# Patient Record
Sex: FEMALE | Race: BLACK OR AFRICAN AMERICAN | NOT HISPANIC OR LATINO | URBAN - METROPOLITAN AREA
[De-identification: names, ages, dates, MRNs, and addresses within clinical notes are randomized per-mention and may not be internally consistent; named-entity substitution may affect disease eponyms.]

---

## 2017-02-08 ENCOUNTER — INPATIENT (INPATIENT)
Facility: HOSPITAL | Age: 82
LOS: 4 days | Discharge: HOME HEALTH | End: 2017-02-13
Attending: INTERNAL MEDICINE | Admitting: INTERNAL MEDICINE
Payer: MEDICARE

## 2017-02-08 VITALS
RESPIRATION RATE: 20 BRPM | OXYGEN SATURATION: 99 % | SYSTOLIC BLOOD PRESSURE: 154 MMHG | HEART RATE: 56 BPM | TEMPERATURE: 98 F | DIASTOLIC BLOOD PRESSURE: 74 MMHG

## 2017-02-08 DIAGNOSIS — J18.9 PNEUMONIA, UNSPECIFIED ORGANISM: ICD-10-CM

## 2017-02-08 DIAGNOSIS — Z29.9 ENCOUNTER FOR PROPHYLACTIC MEASURES, UNSPECIFIED: ICD-10-CM

## 2017-02-08 DIAGNOSIS — K85.90 ACUTE PANCREATITIS WITHOUT NECROSIS OR INFECTION, UNSPECIFIED: ICD-10-CM

## 2017-02-08 DIAGNOSIS — R74.8 ABNORMAL LEVELS OF OTHER SERUM ENZYMES: ICD-10-CM

## 2017-02-08 DIAGNOSIS — Z86.73 PERSONAL HISTORY OF TRANSIENT ISCHEMIC ATTACK (TIA), AND CEREBRAL INFARCTION WITHOUT RESIDUAL DEFICITS: ICD-10-CM

## 2017-02-08 DIAGNOSIS — R94.31 ABNORMAL ELECTROCARDIOGRAM [ECG] [EKG]: ICD-10-CM

## 2017-02-08 DIAGNOSIS — R63.8 OTHER SYMPTOMS AND SIGNS CONCERNING FOOD AND FLUID INTAKE: ICD-10-CM

## 2017-02-08 DIAGNOSIS — Z93.1 GASTROSTOMY STATUS: Chronic | ICD-10-CM

## 2017-02-08 DIAGNOSIS — Z95.828 PRESENCE OF OTHER VASCULAR IMPLANTS AND GRAFTS: Chronic | ICD-10-CM

## 2017-02-08 DIAGNOSIS — N18.6 END STAGE RENAL DISEASE: ICD-10-CM

## 2017-02-08 DIAGNOSIS — Z98.49 CATARACT EXTRACTION STATUS, UNSPECIFIED EYE: Chronic | ICD-10-CM

## 2017-02-08 LAB
ALBUMIN SERPL ELPH-MCNC: 4.2 G/DL — SIGNIFICANT CHANGE UP (ref 3.3–5)
ALP SERPL-CCNC: 157 U/L — HIGH (ref 40–120)
ALT FLD-CCNC: 21 U/L — SIGNIFICANT CHANGE UP (ref 4–33)
AST SERPL-CCNC: 25 U/L — SIGNIFICANT CHANGE UP (ref 4–32)
BASE EXCESS BLDV CALC-SCNC: -7.2 MMOL/L — SIGNIFICANT CHANGE UP
BASOPHILS # BLD AUTO: 0.03 K/UL — SIGNIFICANT CHANGE UP (ref 0–0.2)
BASOPHILS NFR BLD AUTO: 0.3 % — SIGNIFICANT CHANGE UP (ref 0–2)
BASOPHILS NFR SPEC: 0 % — SIGNIFICANT CHANGE UP (ref 0–2)
BILIRUB SERPL-MCNC: 0.4 MG/DL — SIGNIFICANT CHANGE UP (ref 0.2–1.2)
BLOOD GAS VENOUS - CREATININE: 13.6 MG/DL — HIGH (ref 0.5–1.3)
BUN SERPL-MCNC: 205 MG/DL — HIGH (ref 7–23)
CALCIUM SERPL-MCNC: 10.5 MG/DL — SIGNIFICANT CHANGE UP (ref 8.4–10.5)
CHLORIDE BLDV-SCNC: 100 MMOL/L — SIGNIFICANT CHANGE UP (ref 96–108)
CHLORIDE SERPL-SCNC: 92 MMOL/L — LOW (ref 98–107)
CK MB BLD-MCNC: 16.69 NG/ML — HIGH (ref 1–4.7)
CK MB BLD-MCNC: SIGNIFICANT CHANGE UP (ref 0–2.5)
CK SERPL-CCNC: 100 U/L — SIGNIFICANT CHANGE UP (ref 25–170)
CO2 SERPL-SCNC: 13 MMOL/L — LOW (ref 22–31)
CREAT SERPL-MCNC: 12.45 MG/DL — HIGH (ref 0.5–1.3)
EOSINOPHIL # BLD AUTO: 0.2 K/UL — SIGNIFICANT CHANGE UP (ref 0–0.5)
EOSINOPHIL NFR BLD AUTO: 1.7 % — SIGNIFICANT CHANGE UP (ref 0–6)
EOSINOPHIL NFR FLD: 1 % — SIGNIFICANT CHANGE UP (ref 0–6)
GAS PNL BLDV: 134 MMOL/L — LOW (ref 136–146)
GLUCOSE BLDV-MCNC: 99 — SIGNIFICANT CHANGE UP (ref 70–99)
GLUCOSE SERPL-MCNC: 102 MG/DL — HIGH (ref 70–99)
HCO3 BLDV-SCNC: 17 MMOL/L — LOW (ref 20–27)
HCT VFR BLD CALC: 33.2 % — LOW (ref 34.5–45)
HCT VFR BLDV CALC: 33.7 % — LOW (ref 34.5–45)
HGB BLD-MCNC: 10.8 G/DL — LOW (ref 11.5–15.5)
HGB BLDV-MCNC: 10.9 G/DL — LOW (ref 11.5–15.5)
IMM GRANULOCYTES NFR BLD AUTO: 0.2 % — SIGNIFICANT CHANGE UP (ref 0–1.5)
LACTATE BLDV-MCNC: 0.8 MMOL/L — SIGNIFICANT CHANGE UP (ref 0.5–2)
LIDOCAIN IGE QN: > 600 U/L — HIGH (ref 7–60)
LYMPHOCYTES # BLD AUTO: 1.49 K/UL — SIGNIFICANT CHANGE UP (ref 1–3.3)
LYMPHOCYTES # BLD AUTO: 12.9 % — LOW (ref 13–44)
LYMPHOCYTES NFR SPEC AUTO: 16 % — SIGNIFICANT CHANGE UP (ref 13–44)
MANUAL SMEAR VERIFICATION: SIGNIFICANT CHANGE UP
MCHC RBC-ENTMCNC: 30.5 PG — SIGNIFICANT CHANGE UP (ref 27–34)
MCHC RBC-ENTMCNC: 32.5 % — SIGNIFICANT CHANGE UP (ref 32–36)
MCV RBC AUTO: 93.8 FL — SIGNIFICANT CHANGE UP (ref 80–100)
MONOCYTES # BLD AUTO: 1.1 K/UL — HIGH (ref 0–0.9)
MONOCYTES NFR BLD AUTO: 9.5 % — SIGNIFICANT CHANGE UP (ref 2–14)
MONOCYTES NFR BLD: 10 % — HIGH (ref 2–9)
NEUTROPHIL AB SER-ACNC: 73 % — SIGNIFICANT CHANGE UP (ref 43–77)
NEUTROPHILS # BLD AUTO: 8.75 K/UL — HIGH (ref 1.8–7.4)
NEUTROPHILS NFR BLD AUTO: 75.4 % — SIGNIFICANT CHANGE UP (ref 43–77)
PCO2 BLDV: 50 MMHG — SIGNIFICANT CHANGE UP (ref 41–51)
PH BLDV: 7.21 PH — LOW (ref 7.32–7.43)
PLATELET # BLD AUTO: 192 K/UL — SIGNIFICANT CHANGE UP (ref 150–400)
PMV BLD: 12.7 FL — SIGNIFICANT CHANGE UP (ref 7–13)
PO2 BLDV: 31 MMHG — LOW (ref 35–40)
POTASSIUM BLDV-SCNC: 3.9 MMOL/L — SIGNIFICANT CHANGE UP (ref 3.4–4.5)
POTASSIUM SERPL-MCNC: 4.5 MMOL/L — SIGNIFICANT CHANGE UP (ref 3.5–5.3)
POTASSIUM SERPL-SCNC: 4.5 MMOL/L — SIGNIFICANT CHANGE UP (ref 3.5–5.3)
PROT SERPL-MCNC: 7.6 G/DL — SIGNIFICANT CHANGE UP (ref 6–8.3)
RBC # BLD: 3.54 M/UL — LOW (ref 3.8–5.2)
RBC # FLD: 16 % — HIGH (ref 10.3–14.5)
SAO2 % BLDV: 43.2 % — LOW (ref 60–85)
SODIUM SERPL-SCNC: 142 MMOL/L — SIGNIFICANT CHANGE UP (ref 135–145)
TROPONIN T SERPL-MCNC: 0.08 NG/ML — HIGH (ref 0–0.06)
WBC # BLD: 11.59 K/UL — HIGH (ref 3.8–10.5)
WBC # FLD AUTO: 11.59 K/UL — HIGH (ref 3.8–10.5)

## 2017-02-08 PROCEDURE — 71010: CPT | Mod: 26

## 2017-02-08 RX ORDER — CHOLECALCIFEROL (VITAMIN D3) 125 MCG
1000 CAPSULE ORAL DAILY
Qty: 0 | Refills: 0 | Status: DISCONTINUED | OUTPATIENT
Start: 2017-02-08 | End: 2017-02-08

## 2017-02-08 RX ORDER — ERYTHROPOIETIN 10000 [IU]/ML
6000 INJECTION, SOLUTION INTRAVENOUS; SUBCUTANEOUS
Qty: 0 | Refills: 0 | Status: DISCONTINUED | OUTPATIENT
Start: 2017-02-08 | End: 2017-02-10

## 2017-02-08 RX ORDER — MIDODRINE HYDROCHLORIDE 2.5 MG/1
10 TABLET ORAL
Qty: 0 | Refills: 0 | Status: DISCONTINUED | OUTPATIENT
Start: 2017-02-08 | End: 2017-02-13

## 2017-02-08 RX ORDER — VANCOMYCIN HCL 1 G
1000 VIAL (EA) INTRAVENOUS ONCE
Qty: 0 | Refills: 0 | Status: COMPLETED | OUTPATIENT
Start: 2017-02-08 | End: 2017-02-08

## 2017-02-08 RX ORDER — ONDANSETRON 8 MG/1
4 TABLET, FILM COATED ORAL ONCE
Qty: 0 | Refills: 0 | Status: COMPLETED | OUTPATIENT
Start: 2017-02-08 | End: 2017-02-08

## 2017-02-08 RX ORDER — MORPHINE SULFATE 50 MG/1
2 CAPSULE, EXTENDED RELEASE ORAL EVERY 6 HOURS
Qty: 0 | Refills: 0 | Status: DISCONTINUED | OUTPATIENT
Start: 2017-02-08 | End: 2017-02-13

## 2017-02-08 RX ORDER — FAMOTIDINE 10 MG/ML
20 INJECTION INTRAVENOUS ONCE
Qty: 0 | Refills: 0 | Status: COMPLETED | OUTPATIENT
Start: 2017-02-08 | End: 2017-02-08

## 2017-02-08 RX ORDER — SIMVASTATIN 20 MG/1
40 TABLET, FILM COATED ORAL AT BEDTIME
Qty: 0 | Refills: 0 | Status: DISCONTINUED | OUTPATIENT
Start: 2017-02-08 | End: 2017-02-13

## 2017-02-08 RX ORDER — SODIUM CHLORIDE 9 MG/ML
1000 INJECTION, SOLUTION INTRAVENOUS
Qty: 0 | Refills: 0 | Status: DISCONTINUED | OUTPATIENT
Start: 2017-02-08 | End: 2017-02-08

## 2017-02-08 RX ORDER — HEPARIN SODIUM 5000 [USP'U]/ML
5000 INJECTION INTRAVENOUS; SUBCUTANEOUS EVERY 8 HOURS
Qty: 0 | Refills: 0 | Status: DISCONTINUED | OUTPATIENT
Start: 2017-02-08 | End: 2017-02-13

## 2017-02-08 RX ORDER — PANTOPRAZOLE SODIUM 20 MG/1
40 TABLET, DELAYED RELEASE ORAL
Qty: 0 | Refills: 0 | Status: DISCONTINUED | OUTPATIENT
Start: 2017-02-08 | End: 2017-02-09

## 2017-02-08 RX ORDER — SODIUM CHLORIDE 9 MG/ML
1000 INJECTION, SOLUTION INTRAVENOUS
Qty: 0 | Refills: 0 | Status: DISCONTINUED | OUTPATIENT
Start: 2017-02-08 | End: 2017-02-09

## 2017-02-08 RX ORDER — SODIUM CHLORIDE 9 MG/ML
250 INJECTION INTRAMUSCULAR; INTRAVENOUS; SUBCUTANEOUS ONCE
Qty: 0 | Refills: 0 | Status: COMPLETED | OUTPATIENT
Start: 2017-02-08 | End: 2017-02-08

## 2017-02-08 RX ORDER — AZTREONAM 2 G
1000 VIAL (EA) INJECTION ONCE
Qty: 0 | Refills: 0 | Status: COMPLETED | OUTPATIENT
Start: 2017-02-08 | End: 2017-02-08

## 2017-02-08 RX ADMIN — Medication 250 MILLIGRAM(S): at 19:28

## 2017-02-08 RX ADMIN — Medication 30 MILLILITER(S): at 17:58

## 2017-02-08 RX ADMIN — ONDANSETRON 4 MILLIGRAM(S): 8 TABLET, FILM COATED ORAL at 17:59

## 2017-02-08 RX ADMIN — ERYTHROPOIETIN 6000 UNIT(S): 10000 INJECTION, SOLUTION INTRAVENOUS; SUBCUTANEOUS at 23:15

## 2017-02-08 RX ADMIN — FAMOTIDINE 20 MILLIGRAM(S): 10 INJECTION INTRAVENOUS at 17:58

## 2017-02-08 RX ADMIN — SODIUM CHLORIDE 250 MILLILITER(S): 9 INJECTION INTRAMUSCULAR; INTRAVENOUS; SUBCUTANEOUS at 17:59

## 2017-02-08 RX ADMIN — Medication 50 MILLIGRAM(S): at 18:46

## 2017-02-08 NOTE — H&P ADULT. - ASSESSMENT
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

## 2017-02-08 NOTE — ED PROVIDER NOTE - PSH
Arteriovenous graft for hemodialysis in place, primary  1991  non functioning  S/P cataract extraction  Bilateral  S/P gastrostomy  PEG tube

## 2017-02-08 NOTE — ED PROCEDURE NOTE - PROCEDURE ADDITIONAL DETAILS
Focused ED ultrasound of the lungs and pleura:    Findings: Normal lung sliding bilaterally  No pleural effusions  pneumonia visualized on left    Impression: left pneumonia    [place]  [id number]

## 2017-02-08 NOTE — H&P ADULT. - PROBLEM SELECTOR PLAN 4
-chronic since last admission, maybe 2/2 biliary dilatation (causing pancreatitis) vs high bone turn over 2/2 decreased Vit D production from ESRD  -will check GGT  -f/u CT A/P, CTM

## 2017-02-08 NOTE — ED PROVIDER NOTE - MEDICAL DECISION MAKING DETAILS
ddx: gerd vs gastritis vs GE vs pancreatitis. will r/o aspiration pna & pulmonary edema & acs.  -cxr -us -lipase -ce -ekg -pepcid -malox -zofran -reassess -consider ddx: gerd vs gastritis vs GE vs pancreatitis. will r/o aspiration pna & pulmonary edema & acs.  -cxr -us -lipase -ce -ekg -pepcid -malox -zofran -reassess -consider adm for HD

## 2017-02-08 NOTE — H&P ADULT. - NEGATIVE CARDIOVASCULAR SYMPTOMS
no dyspnea on exertion/no orthopnea/no palpitations/no chest pain/no paroxysmal nocturnal dyspnea/no claudication/no peripheral edema

## 2017-02-08 NOTE — H&P ADULT. - HISTORY OF PRESENT ILLNESS
81 y/o F w hx of CVA ~ 25 yrs ago w residual dysphagia requiring PEG tube 83 y/o F PMH CVA (approx 25 years ago) w residual dysphagia requiring PEG tube, ESRD on HD presented with nausea and vomiting that started a few days ago, NBNB. She also endorses epigastric discomfort that is worsened when lying down at night and after feeding herself via PEG. Pt has a h/o GERD for which she takes omeprazole but the past week she hasn’t had any relief from it, has increasing weakness and has been in bed almost all day. Pt was hopsital in 09/2016 at [facility] for similar symptoms, at that time was found to have an acute pancreatitis. This time, she also missed her HD on Tuesday as she was feeling too weak. Pt otherwise denies CP, SOB, D/C, F/C, palpitations, dizziness, headache, melena, hemtachezia, problems with PEG. Per son, pt’s mental status is at baseline but looks a little more tired. Pt at baseline able to ambulate around and performs all ADLs on her own. Pt denies recent travels, sick contact.    VS: 98 HR59  /68 RR18 99%RA  Labs: WBC 11.59, Hgb 10.8, BUN/Cr 205/12.45 (5.49 in 09/2016), AlkPhos 157, Lipase >600 (784 in 09/2016), Trop T 0.08, pH 7.21, CXR-clear  Pt received vanc, aztreonam, 250cc NS, maalox, famotidine IV, zofran IV in the ED.

## 2017-02-08 NOTE — H&P ADULT. - PROBLEM SELECTOR PLAN 3
-Missed Tue’s session, BUN/Cr elevated s/p missed HD  -pt currently mentating well, no hyperkalemia, uremic acidosis with no pericardial uremic friction rub, pt not volume overloaded on exam  -Renal consulted (Number above), will likely dialyse the patient tomorrow  -c/w nephrovite

## 2017-02-08 NOTE — ED ADULT NURSE NOTE - OBJECTIVE STATEMENT
pt received spot 7. pt A+Ox3 with hx of gerd c/o abd discomfort with 2 episodes of n/v and 1 episode of diarrhea. abd soft nondistended no tenderness on palp. pt denies fever/chills. reports missing dialysis yesterday due to weakness. vs as noted. medicated as ordered. MD at bedside for ultrasound. will monitor.

## 2017-02-08 NOTE — H&P ADULT. - PROBLEM SELECTOR PLAN 2
-No prior EKG to compare however pt currently asymptomatic, no prior cardiac event  -elevated cardiac enzymes in the setting of ESRD is falsely positive  -c/w tele monitoring, given extensive murmur on exam, will obtain TTE to assess cardiac function  -elective cath/stress P TTE -No prior EKG to compare however pt currently asymptomatic, no prior cardiac event  -elevated cardiac enzymes in the setting of ESRD is falsely positive  -c/w tele monitoring, given extensive murmur on exam, will obtain TTE to assess cardiac function  -elective cath/stress P TTE. Spoke to son briefly about the procedure, will expound on it further P further work up. -No prior EKG to compare however pt currently asymptomatic, no prior cardiac event, possibly demand ischemia  -elevated cardiac enzymes in the setting of ESRD is falsely positive  -c/w tele monitoring, given extensive murmur on exam, will obtain TTE to assess cardiac function  -P TTE before further work up and discussion

## 2017-02-08 NOTE — ED PROVIDER NOTE - FAMILY HISTORY
<<-----Click on this checkbox to enter Family History History of hypertension     Family history of heart disease     Grandparent  Still living? No  Family history of breast cancer, Age at diagnosis: Age Unknown

## 2017-02-08 NOTE — H&P ADULT. - PROBLEM SELECTOR PLAN 1
-symptoms similar to prior admission at [place], pt with slight leukocytosis, afebrile  -start IVF LR@ 125cc/hr (unknown EF), pain control, c/w tube feed via PEG  -will hold off abx for now as pt clinically looks well, low suspicion for necrotizing pancreatitis; if pt decompensates, to start cipro and flagyl  -CT A/P to evaluate for necrotizing component + peripancreatic fluid collection  -if pt with biliary pancreatitis, may benefit from cholecystectomy inpt vs outpt

## 2017-02-08 NOTE — ED ADULT TRIAGE NOTE - CHIEF COMPLAINT QUOTE
Pt awake and alert c/o abdominal pain  since october but states now it is worse. Pt has gerds. Pt with PEG tube Pt had dialysis saturday. Pt had episode of vomiting yesterday.

## 2017-02-08 NOTE — ED PROVIDER NOTE - CARE PLAN
Principal Discharge DX:	Pneumonia Principal Discharge DX:	Pneumonia  Secondary Diagnosis:	T wave inversion in EKG  Secondary Diagnosis:	ESRD on hemodialysis

## 2017-02-08 NOTE — H&P ADULT. - GASTROINTESTINAL DETAILS
no distention/bowel sounds normal/no masses palpable/no guarding/no rebound tenderness/no rigidity/soft

## 2017-02-08 NOTE — ED PROVIDER NOTE - PMH
Chronic renal failure  On hemodialysis T, Th, Sat  History of CVA (cerebrovascular accident)  Left sided weakness, dysphagia, PEG in place  Murmur    Osteoporosis

## 2017-02-08 NOTE — ED PROVIDER NOTE - OBJECTIVE STATEMENT
82F pmh esrd (t,th, sat, last hd sat), cva w/ chronic dysphagia s/p peg tube, chronic pancreatitis, gerd p/w n/v/d & abdominal burning. yesterday pt developed 2 episode nbnb vomit, 3 episode nb diarrhea & afterwards developed periumbilical burning. states these symps are typical of her "GERD." also has developed worsening acute on chronic cough. pt felt weak so she missed hd yesterday. denies any new meals, sick contacts, aspiration, f/c, cp, sob  nephrologist: ac hinojosa  bedside u/s showe LLL pna

## 2017-02-08 NOTE — PROVIDER CONTACT NOTE (MEDICATION) - RECOMMENDATIONS
Famotidine 20mg orally immediately following dialysis or pantoprazole for delayed-release oral suspension does not require dose adjustments in HD patients.

## 2017-02-08 NOTE — ED PROVIDER NOTE - ATTENDING CONTRIBUTION TO CARE
I performed a face to face evaluation of this patient and performed a history and physical examination on the patient.  I agree with the resident's history, physical examination, and plan of the patient. patient complaining of epigastric burning pain and cough. US shows left pneumonia. will treat as hcap since on HD. also missed HD yesterday. admit.

## 2017-02-08 NOTE — PROVIDER CONTACT NOTE (MEDICATION) - ASSESSMENT
81 y/o F PMH CVA (approx 25 years ago) w/ residual dysphagia requiring PEG tube, GERD, ESRD on HD presented with nausea and vomiting and endorses epigastric discomfort worsened when lying down at night and after feeding herself via PEG. Pt takes OTC famotidine for GERD symptoms (per pt and pharmacy, she is not on prescription med for GERD). Per [employer], dose adjustment optimal for HD pt is famotidine 20mg orally immediately following dialysis (60 milligrams per week). Alternatively, pantoprazole for delayed-release oral suspension does not require dose adjustments in HD patients.

## 2017-02-09 LAB
BASOPHILS # BLD AUTO: 0.03 K/UL — SIGNIFICANT CHANGE UP (ref 0–0.2)
BASOPHILS NFR BLD AUTO: 0.6 % — SIGNIFICANT CHANGE UP (ref 0–2)
BUN SERPL-MCNC: 70 MG/DL — HIGH (ref 7–23)
CALCIUM SERPL-MCNC: 8.5 MG/DL — SIGNIFICANT CHANGE UP (ref 8.4–10.5)
CHLORIDE SERPL-SCNC: 94 MMOL/L — LOW (ref 98–107)
CK SERPL-CCNC: 87 U/L — SIGNIFICANT CHANGE UP (ref 25–170)
CO2 SERPL-SCNC: 28 MMOL/L — SIGNIFICANT CHANGE UP (ref 22–31)
CREAT SERPL-MCNC: 5.45 MG/DL — HIGH (ref 0.5–1.3)
EOSINOPHIL # BLD AUTO: 0.1 K/UL — SIGNIFICANT CHANGE UP (ref 0–0.5)
EOSINOPHIL NFR BLD AUTO: 2.1 % — SIGNIFICANT CHANGE UP (ref 0–6)
FERRITIN SERPL-MCNC: 1560 NG/ML — HIGH (ref 15–150)
FOLATE SERPL-MCNC: > 20 NG/ML — HIGH (ref 4.7–20)
GGT SERPL-CCNC: 22 U/L — SIGNIFICANT CHANGE UP (ref 5–36)
GLUCOSE SERPL-MCNC: 86 MG/DL — SIGNIFICANT CHANGE UP (ref 70–99)
HAPTOGLOB SERPL-MCNC: 178 MG/DL — SIGNIFICANT CHANGE UP (ref 34–200)
HBV CORE AB SER-ACNC: NONREACTIVE — SIGNIFICANT CHANGE UP
HBV CORE IGM SER-ACNC: NONREACTIVE — SIGNIFICANT CHANGE UP
HBV SURFACE AB SER-ACNC: 12.8 MLU/ML — SIGNIFICANT CHANGE UP
HBV SURFACE AG SER-ACNC: NEGATIVE — SIGNIFICANT CHANGE UP
HCT VFR BLD CALC: 29.3 % — LOW (ref 34.5–45)
HCV AB S/CO SERPL IA: 0.1 S/CO — SIGNIFICANT CHANGE UP
HCV AB SERPL-IMP: SIGNIFICANT CHANGE UP
HGB BLD-MCNC: 9.9 G/DL — LOW (ref 11.5–15.5)
IMM GRANULOCYTES NFR BLD AUTO: 0.2 % — SIGNIFICANT CHANGE UP (ref 0–1.5)
IRON SATN MFR SERPL: 103 UG/DL — SIGNIFICANT CHANGE UP (ref 30–160)
IRON SATN MFR SERPL: 174 UG/DL — SIGNIFICANT CHANGE UP (ref 140–530)
LDH SERPL L TO P-CCNC: 149 U/L — SIGNIFICANT CHANGE UP (ref 135–225)
LYMPHOCYTES # BLD AUTO: 0.61 K/UL — LOW (ref 1–3.3)
LYMPHOCYTES # BLD AUTO: 12.7 % — LOW (ref 13–44)
MAGNESIUM SERPL-MCNC: 2.4 MG/DL — SIGNIFICANT CHANGE UP (ref 1.6–2.6)
MCHC RBC-ENTMCNC: 30.9 PG — SIGNIFICANT CHANGE UP (ref 27–34)
MCHC RBC-ENTMCNC: 33.8 % — SIGNIFICANT CHANGE UP (ref 32–36)
MCV RBC AUTO: 91.6 FL — SIGNIFICANT CHANGE UP (ref 80–100)
MONOCYTES # BLD AUTO: 0.43 K/UL — SIGNIFICANT CHANGE UP (ref 0–0.9)
MONOCYTES NFR BLD AUTO: 8.9 % — SIGNIFICANT CHANGE UP (ref 2–14)
NEUTROPHILS # BLD AUTO: 3.64 K/UL — SIGNIFICANT CHANGE UP (ref 1.8–7.4)
NEUTROPHILS NFR BLD AUTO: 75.5 % — SIGNIFICANT CHANGE UP (ref 43–77)
PHOSPHATE SERPL-MCNC: 2.9 MG/DL — SIGNIFICANT CHANGE UP (ref 2.5–4.5)
PLATELET # BLD AUTO: 60 K/UL — LOW (ref 150–400)
POTASSIUM SERPL-MCNC: 2.9 MMOL/L — CRITICAL LOW (ref 3.5–5.3)
POTASSIUM SERPL-SCNC: 2.9 MMOL/L — CRITICAL LOW (ref 3.5–5.3)
RBC # BLD: 3.2 M/UL — LOW (ref 3.8–5.2)
RBC # FLD: 15.9 % — HIGH (ref 10.3–14.5)
RETICS #: 74.9 10X3/UL — HIGH (ref 17–73)
RETICS/RBC NFR: 2.3 % — SIGNIFICANT CHANGE UP (ref 0.5–2.5)
SODIUM SERPL-SCNC: 141 MMOL/L — SIGNIFICANT CHANGE UP (ref 135–145)
TROPONIN T SERPL-MCNC: 0.06 NG/ML — SIGNIFICANT CHANGE UP (ref 0–0.06)
UIBC SERPL-MCNC: 71 UG/DL — LOW (ref 110–370)
VIT B12 SERPL-MCNC: 1253 PG/ML — HIGH (ref 200–900)
WBC # BLD: 4.82 K/UL — SIGNIFICANT CHANGE UP (ref 3.8–10.5)
WBC # FLD AUTO: 4.82 K/UL — SIGNIFICANT CHANGE UP (ref 3.8–10.5)

## 2017-02-09 PROCEDURE — 74177 CT ABD & PELVIS W/CONTRAST: CPT | Mod: 26

## 2017-02-09 RX ORDER — ASPIRIN/CALCIUM CARB/MAGNESIUM 324 MG
81 TABLET ORAL DAILY
Qty: 0 | Refills: 0 | Status: DISCONTINUED | OUTPATIENT
Start: 2017-02-09 | End: 2017-02-12

## 2017-02-09 RX ORDER — POTASSIUM CHLORIDE 20 MEQ
40 PACKET (EA) ORAL ONCE
Qty: 0 | Refills: 0 | Status: COMPLETED | OUTPATIENT
Start: 2017-02-09 | End: 2017-02-09

## 2017-02-09 RX ORDER — PANTOPRAZOLE SODIUM 20 MG/1
40 TABLET, DELAYED RELEASE ORAL
Qty: 0 | Refills: 0 | Status: DISCONTINUED | OUTPATIENT
Start: 2017-02-09 | End: 2017-02-13

## 2017-02-09 RX ADMIN — Medication 40 MILLIEQUIVALENT(S): at 23:05

## 2017-02-09 RX ADMIN — HEPARIN SODIUM 5000 UNIT(S): 5000 INJECTION INTRAVENOUS; SUBCUTANEOUS at 05:41

## 2017-02-09 RX ADMIN — HEPARIN SODIUM 5000 UNIT(S): 5000 INJECTION INTRAVENOUS; SUBCUTANEOUS at 14:21

## 2017-02-09 RX ADMIN — PANTOPRAZOLE SODIUM 40 MILLIGRAM(S): 20 TABLET, DELAYED RELEASE ORAL at 11:35

## 2017-02-09 RX ADMIN — SODIUM CHLORIDE 40 MILLILITER(S): 9 INJECTION, SOLUTION INTRAVENOUS at 05:40

## 2017-02-09 RX ADMIN — Medication 1 TABLET(S): at 11:35

## 2017-02-09 RX ADMIN — SIMVASTATIN 40 MILLIGRAM(S): 20 TABLET, FILM COATED ORAL at 22:12

## 2017-02-09 RX ADMIN — MIDODRINE HYDROCHLORIDE 10 MILLIGRAM(S): 2.5 TABLET ORAL at 11:35

## 2017-02-09 RX ADMIN — HEPARIN SODIUM 5000 UNIT(S): 5000 INJECTION INTRAVENOUS; SUBCUTANEOUS at 22:11

## 2017-02-10 LAB
BUN SERPL-MCNC: 29 MG/DL — HIGH (ref 7–23)
CALCIUM SERPL-MCNC: 9 MG/DL — SIGNIFICANT CHANGE UP (ref 8.4–10.5)
CHLORIDE SERPL-SCNC: 97 MMOL/L — LOW (ref 98–107)
CO2 SERPL-SCNC: 30 MMOL/L — SIGNIFICANT CHANGE UP (ref 22–31)
CREAT SERPL-MCNC: 3.2 MG/DL — HIGH (ref 0.5–1.3)
GLUCOSE SERPL-MCNC: 81 MG/DL — SIGNIFICANT CHANGE UP (ref 70–99)
HCT VFR BLD CALC: 31.9 % — LOW (ref 34.5–45)
HGB BLD-MCNC: 10.4 G/DL — LOW (ref 11.5–15.5)
MCHC RBC-ENTMCNC: 31 PG — SIGNIFICANT CHANGE UP (ref 27–34)
MCHC RBC-ENTMCNC: 32.6 % — SIGNIFICANT CHANGE UP (ref 32–36)
MCV RBC AUTO: 94.9 FL — SIGNIFICANT CHANGE UP (ref 80–100)
PLATELET # BLD AUTO: 148 K/UL — LOW (ref 150–400)
PMV BLD: 12.2 FL — SIGNIFICANT CHANGE UP (ref 7–13)
PMV BLD: 12.8 FL — SIGNIFICANT CHANGE UP (ref 7–13)
POTASSIUM SERPL-MCNC: 4.4 MMOL/L — SIGNIFICANT CHANGE UP (ref 3.5–5.3)
POTASSIUM SERPL-SCNC: 4.4 MMOL/L — SIGNIFICANT CHANGE UP (ref 3.5–5.3)
RBC # BLD: 3.36 M/UL — LOW (ref 3.8–5.2)
RBC # FLD: 16.2 % — HIGH (ref 10.3–14.5)
SODIUM SERPL-SCNC: 142 MMOL/L — SIGNIFICANT CHANGE UP (ref 135–145)
WBC # BLD: 5.72 K/UL — SIGNIFICANT CHANGE UP (ref 3.8–10.5)
WBC # FLD AUTO: 5.72 K/UL — SIGNIFICANT CHANGE UP (ref 3.8–10.5)

## 2017-02-10 PROCEDURE — 93306 TTE W/DOPPLER COMPLETE: CPT | Mod: 26

## 2017-02-10 RX ORDER — ERYTHROPOIETIN 10000 [IU]/ML
6000 INJECTION, SOLUTION INTRAVENOUS; SUBCUTANEOUS
Qty: 0 | Refills: 0 | Status: DISCONTINUED | OUTPATIENT
Start: 2017-02-10 | End: 2017-02-13

## 2017-02-10 RX ADMIN — HEPARIN SODIUM 5000 UNIT(S): 5000 INJECTION INTRAVENOUS; SUBCUTANEOUS at 21:58

## 2017-02-10 RX ADMIN — Medication 81 MILLIGRAM(S): at 16:58

## 2017-02-10 RX ADMIN — SIMVASTATIN 40 MILLIGRAM(S): 20 TABLET, FILM COATED ORAL at 21:58

## 2017-02-10 RX ADMIN — HEPARIN SODIUM 5000 UNIT(S): 5000 INJECTION INTRAVENOUS; SUBCUTANEOUS at 16:58

## 2017-02-10 RX ADMIN — Medication 1 TABLET(S): at 16:58

## 2017-02-10 RX ADMIN — HEPARIN SODIUM 5000 UNIT(S): 5000 INJECTION INTRAVENOUS; SUBCUTANEOUS at 05:15

## 2017-02-10 RX ADMIN — PANTOPRAZOLE SODIUM 40 MILLIGRAM(S): 20 TABLET, DELAYED RELEASE ORAL at 06:50

## 2017-02-11 LAB
BUN SERPL-MCNC: 60 MG/DL — HIGH (ref 7–23)
CALCIUM SERPL-MCNC: 9.7 MG/DL — SIGNIFICANT CHANGE UP (ref 8.4–10.5)
CHLORIDE SERPL-SCNC: 98 MMOL/L — SIGNIFICANT CHANGE UP (ref 98–107)
CO2 SERPL-SCNC: 33 MMOL/L — HIGH (ref 22–31)
CREAT SERPL-MCNC: 5.36 MG/DL — HIGH (ref 0.5–1.3)
GLUCOSE SERPL-MCNC: 92 MG/DL — SIGNIFICANT CHANGE UP (ref 70–99)
HCT VFR BLD CALC: 30.5 % — LOW (ref 34.5–45)
HGB BLD-MCNC: 9.7 G/DL — LOW (ref 11.5–15.5)
LIDOCAIN IGE QN: 214.5 U/L — HIGH (ref 7–60)
MCHC RBC-ENTMCNC: 31.2 PG — SIGNIFICANT CHANGE UP (ref 27–34)
MCHC RBC-ENTMCNC: 31.8 % — LOW (ref 32–36)
MCV RBC AUTO: 98.1 FL — SIGNIFICANT CHANGE UP (ref 80–100)
PLATELET # BLD AUTO: 191 K/UL — SIGNIFICANT CHANGE UP (ref 150–400)
PMV BLD: 13.6 FL — HIGH (ref 7–13)
POTASSIUM SERPL-MCNC: 4.6 MMOL/L — SIGNIFICANT CHANGE UP (ref 3.5–5.3)
POTASSIUM SERPL-SCNC: 4.6 MMOL/L — SIGNIFICANT CHANGE UP (ref 3.5–5.3)
RBC # BLD: 3.11 M/UL — LOW (ref 3.8–5.2)
RBC # FLD: 16.3 % — HIGH (ref 10.3–14.5)
SODIUM SERPL-SCNC: 144 MMOL/L — SIGNIFICANT CHANGE UP (ref 135–145)
WBC # BLD: 7.36 K/UL — SIGNIFICANT CHANGE UP (ref 3.8–10.5)
WBC # FLD AUTO: 7.36 K/UL — SIGNIFICANT CHANGE UP (ref 3.8–10.5)

## 2017-02-11 RX ADMIN — HEPARIN SODIUM 5000 UNIT(S): 5000 INJECTION INTRAVENOUS; SUBCUTANEOUS at 22:03

## 2017-02-11 RX ADMIN — Medication 1 TABLET(S): at 12:45

## 2017-02-11 RX ADMIN — HEPARIN SODIUM 5000 UNIT(S): 5000 INJECTION INTRAVENOUS; SUBCUTANEOUS at 13:29

## 2017-02-11 RX ADMIN — HEPARIN SODIUM 5000 UNIT(S): 5000 INJECTION INTRAVENOUS; SUBCUTANEOUS at 06:35

## 2017-02-11 RX ADMIN — MIDODRINE HYDROCHLORIDE 10 MILLIGRAM(S): 2.5 TABLET ORAL at 07:32

## 2017-02-11 RX ADMIN — ERYTHROPOIETIN 6000 UNIT(S): 10000 INJECTION, SOLUTION INTRAVENOUS; SUBCUTANEOUS at 07:35

## 2017-02-11 RX ADMIN — Medication 81 MILLIGRAM(S): at 12:45

## 2017-02-11 RX ADMIN — PANTOPRAZOLE SODIUM 40 MILLIGRAM(S): 20 TABLET, DELAYED RELEASE ORAL at 06:33

## 2017-02-11 RX ADMIN — SIMVASTATIN 40 MILLIGRAM(S): 20 TABLET, FILM COATED ORAL at 22:03

## 2017-02-12 LAB
BUN SERPL-MCNC: 38 MG/DL — HIGH (ref 7–23)
CALCIUM SERPL-MCNC: 9.4 MG/DL — SIGNIFICANT CHANGE UP (ref 8.4–10.5)
CHLORIDE SERPL-SCNC: 99 MMOL/L — SIGNIFICANT CHANGE UP (ref 98–107)
CO2 SERPL-SCNC: 29 MMOL/L — SIGNIFICANT CHANGE UP (ref 22–31)
CREAT SERPL-MCNC: 3.6 MG/DL — HIGH (ref 0.5–1.3)
GLUCOSE SERPL-MCNC: 101 MG/DL — HIGH (ref 70–99)
HCT VFR BLD CALC: 31.5 % — LOW (ref 34.5–45)
HGB BLD-MCNC: 9.8 G/DL — LOW (ref 11.5–15.5)
LIDOCAIN IGE QN: 189.1 U/L — HIGH (ref 7–60)
MCHC RBC-ENTMCNC: 31.1 % — LOW (ref 32–36)
MCHC RBC-ENTMCNC: 31.1 PG — SIGNIFICANT CHANGE UP (ref 27–34)
MCV RBC AUTO: 100 FL — SIGNIFICANT CHANGE UP (ref 80–100)
PLATELET # BLD AUTO: 240 K/UL — SIGNIFICANT CHANGE UP (ref 150–400)
PMV BLD: 13.2 FL — HIGH (ref 7–13)
POTASSIUM SERPL-MCNC: 4.6 MMOL/L — SIGNIFICANT CHANGE UP (ref 3.5–5.3)
POTASSIUM SERPL-SCNC: 4.6 MMOL/L — SIGNIFICANT CHANGE UP (ref 3.5–5.3)
RBC # BLD: 3.15 M/UL — LOW (ref 3.8–5.2)
RBC # FLD: 16.3 % — HIGH (ref 10.3–14.5)
SODIUM SERPL-SCNC: 142 MMOL/L — SIGNIFICANT CHANGE UP (ref 135–145)
WBC # BLD: 7.97 K/UL — SIGNIFICANT CHANGE UP (ref 3.8–10.5)
WBC # FLD AUTO: 7.97 K/UL — SIGNIFICANT CHANGE UP (ref 3.8–10.5)

## 2017-02-12 RX ORDER — ASPIRIN/CALCIUM CARB/MAGNESIUM 324 MG
81 TABLET ORAL DAILY
Qty: 0 | Refills: 0 | Status: DISCONTINUED | OUTPATIENT
Start: 2017-02-12 | End: 2017-02-13

## 2017-02-12 RX ORDER — ASPIRIN/CALCIUM CARB/MAGNESIUM 324 MG
81 TABLET ORAL DAILY
Qty: 0 | Refills: 0 | Status: DISCONTINUED | OUTPATIENT
Start: 2017-02-12 | End: 2017-02-12

## 2017-02-12 RX ADMIN — SIMVASTATIN 40 MILLIGRAM(S): 20 TABLET, FILM COATED ORAL at 22:51

## 2017-02-12 RX ADMIN — HEPARIN SODIUM 5000 UNIT(S): 5000 INJECTION INTRAVENOUS; SUBCUTANEOUS at 06:39

## 2017-02-12 RX ADMIN — Medication 81 MILLIGRAM(S): at 15:52

## 2017-02-12 RX ADMIN — Medication 1 TABLET(S): at 15:52

## 2017-02-12 RX ADMIN — PANTOPRAZOLE SODIUM 40 MILLIGRAM(S): 20 TABLET, DELAYED RELEASE ORAL at 06:38

## 2017-02-12 RX ADMIN — HEPARIN SODIUM 5000 UNIT(S): 5000 INJECTION INTRAVENOUS; SUBCUTANEOUS at 22:51

## 2017-02-12 RX ADMIN — HEPARIN SODIUM 5000 UNIT(S): 5000 INJECTION INTRAVENOUS; SUBCUTANEOUS at 13:15

## 2017-02-13 VITALS — WEIGHT: 103.62 LBS

## 2017-02-13 LAB
AMYLASE P1 CFR SERPL: 294 U/L — HIGH (ref 25–125)
BUN SERPL-MCNC: 68 MG/DL — HIGH (ref 7–23)
CALCIUM SERPL-MCNC: 10 MG/DL — SIGNIFICANT CHANGE UP (ref 8.4–10.5)
CHLORIDE SERPL-SCNC: 97 MMOL/L — LOW (ref 98–107)
CO2 SERPL-SCNC: 29 MMOL/L — SIGNIFICANT CHANGE UP (ref 22–31)
CREAT SERPL-MCNC: 5.34 MG/DL — HIGH (ref 0.5–1.3)
GLUCOSE SERPL-MCNC: 99 MG/DL — SIGNIFICANT CHANGE UP (ref 70–99)
HCT VFR BLD CALC: 30.5 % — LOW (ref 34.5–45)
HGB BLD-MCNC: 9.7 G/DL — LOW (ref 11.5–15.5)
LIDOCAIN IGE QN: 203.5 U/L — HIGH (ref 7–60)
MCHC RBC-ENTMCNC: 31.8 % — LOW (ref 32–36)
MCHC RBC-ENTMCNC: 31.8 PG — SIGNIFICANT CHANGE UP (ref 27–34)
MCV RBC AUTO: 100 FL — SIGNIFICANT CHANGE UP (ref 80–100)
PLATELET # BLD AUTO: 279 K/UL — SIGNIFICANT CHANGE UP (ref 150–400)
PMV BLD: 12.4 FL — SIGNIFICANT CHANGE UP (ref 7–13)
POTASSIUM SERPL-MCNC: 5.4 MMOL/L — HIGH (ref 3.5–5.3)
POTASSIUM SERPL-SCNC: 5.4 MMOL/L — HIGH (ref 3.5–5.3)
RBC # BLD: 3.05 M/UL — LOW (ref 3.8–5.2)
RBC # FLD: 16 % — HIGH (ref 10.3–14.5)
SODIUM SERPL-SCNC: 143 MMOL/L — SIGNIFICANT CHANGE UP (ref 135–145)
WBC # BLD: 8.23 K/UL — SIGNIFICANT CHANGE UP (ref 3.8–10.5)
WBC # FLD AUTO: 8.23 K/UL — SIGNIFICANT CHANGE UP (ref 3.8–10.5)

## 2017-02-13 RX ORDER — ASPIRIN/CALCIUM CARB/MAGNESIUM 324 MG
1 TABLET ORAL
Qty: 0 | Refills: 0 | COMMUNITY
Start: 2017-02-13

## 2017-02-13 RX ORDER — ASPIRIN/CALCIUM CARB/MAGNESIUM 324 MG
1 TABLET ORAL
Qty: 0 | Refills: 0 | DISCHARGE
Start: 2017-02-13

## 2017-02-13 RX ORDER — PANTOPRAZOLE SODIUM 20 MG/1
1 TABLET, DELAYED RELEASE ORAL
Qty: 30 | Refills: 0
Start: 2017-02-13 | End: 2017-03-15

## 2017-02-13 RX ADMIN — HEPARIN SODIUM 5000 UNIT(S): 5000 INJECTION INTRAVENOUS; SUBCUTANEOUS at 13:38

## 2017-02-13 RX ADMIN — Medication 1 TABLET(S): at 11:16

## 2017-02-13 RX ADMIN — Medication 81 MILLIGRAM(S): at 11:16

## 2017-02-13 RX ADMIN — HEPARIN SODIUM 5000 UNIT(S): 5000 INJECTION INTRAVENOUS; SUBCUTANEOUS at 06:13

## 2017-02-13 RX ADMIN — PANTOPRAZOLE SODIUM 40 MILLIGRAM(S): 20 TABLET, DELAYED RELEASE ORAL at 06:11

## 2017-02-13 NOTE — DISCHARGE NOTE ADULT - MEDICATION SUMMARY - MEDICATIONS TO STOP TAKING
I will STOP taking the medications listed below when I get home from the hospital:  None I will STOP taking the medications listed below when I get home from the hospital:    Pepcid AC 10 mg oral tablet  -- 1 tab(s) by gastrostomy tube 1 to 2 times a day; As Needed

## 2017-02-13 NOTE — DISCHARGE NOTE ADULT - PROVIDER TOKENS
FREE:[LAST:[Please call your PMD for an appointment within 2 weeks],PHONE:[(   )    -],FAX:[(   )    -]]

## 2017-02-13 NOTE — DIETITIAN INITIAL EVALUATION ADULT. - PROBLEM SELECTOR PLAN 1
-symptoms similar to prior admission at [facility], pt with slight leukocytosis, afebrile  -start IVF LR@ 125cc/hr (unknown EF), pain control, c/w tube feed via PEG  -will hold off abx for now as pt clinically looks well, low suspicion for necrotizing pancreatitis; if pt decompensates, to start cipro and flagyl  -CT A/P to evaluate for necrotizing component + peripancreatic fluid collection  -if pt with biliary pancreatitis, may benefit from cholecystectomy inpt vs outpt

## 2017-02-13 NOTE — DISCHARGE NOTE ADULT - CARE PLAN
Principal Discharge DX:	Acute pancreatitis, unspecified complication status, unspecified pancreatitis type  Goal:	stable for discharge  Instructions for follow-up, activity and diet:	Please follow up with your primary care provider within 2 weeks call for an appointment

## 2017-02-13 NOTE — DIETITIAN INITIAL EVALUATION ADULT. - PROBLEM SELECTOR PLAN 2
-No prior EKG to compare however pt currently asymptomatic, no prior cardiac event, possibly demand ischemia  -elevated cardiac enzymes in the setting of ESRD is falsely positive  -c/w tele monitoring, given extensive murmur on exam, will obtain TTE to assess cardiac function  -P TTE before further work up and discussion

## 2017-02-13 NOTE — DISCHARGE NOTE ADULT - DURABLE MEDICAL EQUIPMENT AGENCY
[facility] [phone] for rolling walker for bedside delivery today

## 2017-02-13 NOTE — DISCHARGE NOTE ADULT - HOSPITAL COURSE
This is a 81 yo F admitted with nausea and vomitting. Patient had acute pancreatitis. Patient was hypokalemic and the potassium was repleted. CT abd/pel tiny pancreatic cyst and end stage atrophic kidneys. Cardio consulted and started aspirin.  ECHO with mild MR and LVOT obstruction to assess aotic area, normal LV wall thickness, mild diastolic dysfxn, normal LV fxn. Pancreatitis improved and amylase and lipase trending down.  Patient discharge on nepro and puree diet.

## 2017-02-13 NOTE — DISCHARGE NOTE ADULT - PLAN OF CARE
stable for discharge Please follow up with your primary care provider within 2 weeks call for an appointment

## 2017-02-13 NOTE — DISCHARGE NOTE ADULT - MEDICATION SUMMARY - MEDICATIONS TO TAKE
I will START or STAY ON the medications listed below when I get home from the hospital:    aspirin 81 mg oral tablet, chewable  -- 1 tab(s) by mouth once a day  -- Indication: For Heart    simvastatin 40 mg oral tablet  -- 1 tab(s) by gastrostomy tube once a day (at bedtime)  -- Indication: For cholesterol    Pepcid AC 10 mg oral tablet  -- 1 tab(s) by gastrostomy tube 1 to 2 times a day; As Needed  -- Indication: For GERD    midodrine 10 mg oral tablet  -- 1 tab(s) by gastrostomy tube, 30 minutes before dialysis 3 times per week  -- Indication: For blood pressure    brimonidine 0.2% ophthalmic solution  -- 1 drop(s) to each affected eye 3 times a day  -- Indication: For Eye drops    Cosopt 2.23%-0.68% ophthalmic solution  -- 1 drop(s) to each affected eye 2 times a day  -- Indication: For Eye drops    Travatan Z 0.004% ophthalmic solution  -- 1 drop(s) to each affected eye once a day (in the evening)  -- Indication: For Eye drops    Darcy-Melody oral tablet  -- 1 tab(s) by gastrostomy tube once a day  -- Indication: For renal disease    Vitamin D3 1000 intl units oral capsule  -- 1 cap(s) by gastrostomy tube once a day  -- Indication: For supplement I will START or STAY ON the medications listed below when I get home from the hospital:    aspirin 81 mg oral tablet, chewable  -- 1 tab(s) by mouth once a day  -- Indication: For Heart    simvastatin 40 mg oral tablet  -- 1 tab(s) by gastrostomy tube once a day (at bedtime)  -- Indication: For cholesterol    midodrine 10 mg oral tablet  -- 1 tab(s) by gastrostomy tube, 30 minutes before dialysis 3 times per week  -- Indication: For blood pressure    brimonidine 0.2% ophthalmic solution  -- 1 drop(s) to each affected eye 3 times a day  -- Indication: For Eye drops    Cosopt 2.23%-0.68% ophthalmic solution  -- 1 drop(s) to each affected eye 2 times a day  -- Indication: For Eye drops    Travatan Z 0.004% ophthalmic solution  -- 1 drop(s) to each affected eye once a day (in the evening)  -- Indication: For Eye drops    pantoprazole 40 mg oral granule, enteric coated  -- 1 tab(s) by mouth once a day  -- Indication: For GERD    Darcy-Melody oral tablet  -- 1 tab(s) by gastrostomy tube once a day  -- Indication: For renal disease    Vitamin D3 1000 intl units oral capsule  -- 1 cap(s) by gastrostomy tube once a day  -- Indication: For supplement

## 2017-02-13 NOTE — DIETITIAN INITIAL EVALUATION ADULT. - OTHER INFO
Pt is fed via PEG since she had a CVA with dysphagia residual. PA spoke with Pt's daughter who feeds her. The daughter stated that Pt has been on Nepro and she has been tolerating it well. She also feeds her Dys I, pureed foods. Pt has had a 10 lb weight loss since her admission in Sept, 2016. Pt on admission in Sept was 47 kg and her weight at this admission was 43.8 kg. Daughter instructed to increase tube feeding.

## 2017-02-13 NOTE — DISCHARGE NOTE ADULT - PATIENT PORTAL LINK FT
“You can access the [facility] Patient Portal, offered by [facility], by registering with the following website: [email or web address]”

## 2017-03-04 ENCOUNTER — EMERGENCY (EMERGENCY)
Facility: HOSPITAL | Age: 82
LOS: 0 days | Discharge: HOME | End: 2017-03-04
Attending: EMERGENCY MEDICINE
Payer: MEDICARE

## 2017-03-04 VITALS
RESPIRATION RATE: 20 BRPM | DIASTOLIC BLOOD PRESSURE: 59 MMHG | OXYGEN SATURATION: 99 % | HEART RATE: 64 BPM | WEIGHT: 100.09 LBS | HEIGHT: 61 IN | SYSTOLIC BLOOD PRESSURE: 136 MMHG | TEMPERATURE: 98 F

## 2017-03-04 VITALS
RESPIRATION RATE: 18 BRPM | OXYGEN SATURATION: 96 % | SYSTOLIC BLOOD PRESSURE: 123 MMHG | DIASTOLIC BLOOD PRESSURE: 68 MMHG | TEMPERATURE: 98 F | HEART RATE: 89 BPM

## 2017-03-04 DIAGNOSIS — M81.0 AGE-RELATED OSTEOPOROSIS WITHOUT CURRENT PATHOLOGICAL FRACTURE: ICD-10-CM

## 2017-03-04 DIAGNOSIS — R53.1 WEAKNESS: ICD-10-CM

## 2017-03-04 DIAGNOSIS — Z79.82 LONG TERM (CURRENT) USE OF ASPIRIN: ICD-10-CM

## 2017-03-04 DIAGNOSIS — L76.82 OTHER POSTPROCEDURAL COMPLICATIONS OF SKIN AND SUBCUTANEOUS TISSUE: ICD-10-CM

## 2017-03-04 DIAGNOSIS — N18.9 CHRONIC KIDNEY DISEASE, UNSPECIFIED: ICD-10-CM

## 2017-03-04 DIAGNOSIS — Z98.49 CATARACT EXTRACTION STATUS, UNSPECIFIED EYE: Chronic | ICD-10-CM

## 2017-03-04 DIAGNOSIS — Z86.73 PERSONAL HISTORY OF TRANSIENT ISCHEMIC ATTACK (TIA), AND CEREBRAL INFARCTION WITHOUT RESIDUAL DEFICITS: ICD-10-CM

## 2017-03-04 DIAGNOSIS — Z95.828 PRESENCE OF OTHER VASCULAR IMPLANTS AND GRAFTS: Chronic | ICD-10-CM

## 2017-03-04 DIAGNOSIS — Z93.1 GASTROSTOMY STATUS: Chronic | ICD-10-CM

## 2017-03-04 DIAGNOSIS — R01.1 CARDIAC MURMUR, UNSPECIFIED: ICD-10-CM

## 2017-03-04 DIAGNOSIS — Z88.0 ALLERGY STATUS TO PENICILLIN: ICD-10-CM

## 2017-03-04 LAB
ALBUMIN SERPL ELPH-MCNC: 3.1 G/DL — LOW (ref 3.3–5)
ALP SERPL-CCNC: 186 U/L — HIGH (ref 40–120)
ALT FLD-CCNC: 25 U/L — SIGNIFICANT CHANGE UP (ref 12–78)
ANION GAP SERPL CALC-SCNC: 9 MMOL/L — SIGNIFICANT CHANGE UP (ref 5–17)
ANISOCYTOSIS BLD QL: SLIGHT — SIGNIFICANT CHANGE UP
APTT BLD: 45.1 SEC — HIGH (ref 27.5–37.4)
AST SERPL-CCNC: 32 U/L — SIGNIFICANT CHANGE UP (ref 15–37)
BILIRUB SERPL-MCNC: 0.3 MG/DL — SIGNIFICANT CHANGE UP (ref 0.2–1.2)
BUN SERPL-MCNC: 16 MG/DL — SIGNIFICANT CHANGE UP (ref 7–23)
CALCIUM SERPL-MCNC: 7.7 MG/DL — LOW (ref 8.5–10.1)
CHLORIDE SERPL-SCNC: 99 MMOL/L — SIGNIFICANT CHANGE UP (ref 96–108)
CO2 SERPL-SCNC: 30 MMOL/L — SIGNIFICANT CHANGE UP (ref 22–31)
CREAT SERPL-MCNC: 1.84 MG/DL — HIGH (ref 0.5–1.3)
EOSINOPHIL NFR BLD AUTO: 12 % — HIGH (ref 0–6)
GLUCOSE SERPL-MCNC: 102 MG/DL — HIGH (ref 70–99)
HCT VFR BLD CALC: 28.6 % — LOW (ref 34.5–45)
HGB BLD-MCNC: 10 G/DL — LOW (ref 11.5–15.5)
HYPOCHROMIA BLD QL: SLIGHT — SIGNIFICANT CHANGE UP
INR BLD: 0.95 RATIO — SIGNIFICANT CHANGE UP (ref 0.88–1.16)
LYMPHOCYTES # BLD AUTO: 9 % — LOW (ref 13–44)
MCHC RBC-ENTMCNC: 33.6 PG — SIGNIFICANT CHANGE UP (ref 27–34)
MCHC RBC-ENTMCNC: 34.8 GM/DL — SIGNIFICANT CHANGE UP (ref 32–36)
MCV RBC AUTO: 96.5 FL — SIGNIFICANT CHANGE UP (ref 80–100)
MONOCYTES NFR BLD AUTO: 12 % — SIGNIFICANT CHANGE UP (ref 2–14)
NEUTROPHILS NFR BLD AUTO: 67 % — SIGNIFICANT CHANGE UP (ref 43–77)
PLAT MORPH BLD: NORMAL — SIGNIFICANT CHANGE UP
PLATELET # BLD AUTO: 179 K/UL — SIGNIFICANT CHANGE UP (ref 150–400)
POTASSIUM SERPL-MCNC: 3.1 MMOL/L — LOW (ref 3.5–5.3)
POTASSIUM SERPL-SCNC: 3.1 MMOL/L — LOW (ref 3.5–5.3)
PROT SERPL-MCNC: 6.9 GM/DL — SIGNIFICANT CHANGE UP (ref 6–8.3)
PROTHROM AB SERPL-ACNC: 10.6 SEC — SIGNIFICANT CHANGE UP (ref 10–13.1)
RBC # BLD: 2.97 M/UL — LOW (ref 3.8–5.2)
RBC # FLD: 17.5 % — HIGH (ref 11–15)
RBC BLD AUTO: ABNORMAL
SODIUM SERPL-SCNC: 138 MMOL/L — SIGNIFICANT CHANGE UP (ref 135–145)
STOMATOCYTES BLD QL SMEAR: PRESENT — SIGNIFICANT CHANGE UP
WBC # BLD: 8.9 K/UL — SIGNIFICANT CHANGE UP (ref 3.8–10.5)
WBC # FLD AUTO: 8.9 K/UL — SIGNIFICANT CHANGE UP (ref 3.8–10.5)

## 2017-03-04 PROCEDURE — 99284 EMERGENCY DEPT VISIT MOD MDM: CPT

## 2017-03-04 PROCEDURE — 71010: CPT | Mod: 26

## 2017-03-04 NOTE — ED ADULT NURSE REASSESSMENT NOTE - NS ED NURSE REASSESS COMMENT FT1
Pt has a pink band in the right arm,  a dialysis access on the left chest. Unable to obtain iv access in the right upper extremities. Dr Forde made aware.

## 2017-03-04 NOTE — ED ADULT NURSE NOTE - OBJECTIVE STATEMENT
PT came to the er bc she is bleeding from the dialysis port. just came from [facility] Pt has a peg tube. Pt cant swallow bc of previous stroke

## 2017-03-04 NOTE — ED PROVIDER NOTE - PROGRESS NOTE DETAILS
wound cleaned. + surgicel placed, and compression pt still oozing,. oozing stopped with pressure. sutured cath in place.

## 2017-03-04 NOTE — ED PROVIDER NOTE - PHYSICAL EXAMINATION
Gen: Alert, Well appearing. NAD  Head: NC, AT, PERRL, EOMI, normal lids/conjunctiva ENT: Bilateral TM WNL, normal hearing, patent oropharynx without erythema/exudate, uvula midline Neck: supple, no tenderness/meningismus/JVD Pulm: Bilateral clear BS, normal resp effort, no wheeze/stridor/retractions  CV: RRR, no M/R/G, +dist pulses Abd: soft, NT/ND, +BS, no guarding/rebound tenderness  Mskel: no edema/erythema/cyanosis Skin: no rash   Neuro: AAOx3, no sensory/motor deficits, CN 2-12 intact   + left permacath: sutured around tube, but not 2 suture areas. Oozing around permacath

## 2017-03-04 NOTE — ED PROVIDER NOTE - OBJECTIVE STATEMENT
81yo female with pmh ESRD on dialysis ([facility]), HTN, CVA with LT residual presents with bleeding from left permacath s/p dialysis today. Pt reports permacath changed 2 days ago. Pt denies symptoms.     No fever/chills. No photophobia/eye pain/changes in vision, No ear pain/sore throat/dysphagia, No chest pain/palpitations. No SOB/cough/stridor. No abdominal pain, N/V/D, no black/bloody bm. No dysuria/frequency/discharge, No headache. No Dizziness.  No rash.  No numbness/tingling/weakness.

## 2017-03-04 NOTE — ED ADULT TRIAGE NOTE - CHIEF COMPLAINT QUOTE
patient c/o of bleeding from the dialysis access , patient has dialysis today , patient had catheter replace 2 days ago , denied any chest pain or difficulty breathing

## 2017-03-04 NOTE — ED PROVIDER NOTE - MEDICAL DECISION MAKING DETAILS
bleeding stopped. pt to fu with the catheter on monday. Discussed results and outcome of testing with the patient.  Patient given copy of available results. Patient advised to please follow up with their PMD within the next 24 hours and return to the Emergency Department for worsening symptoms or any other concerns.

## 2017-03-22 ENCOUNTER — APPOINTMENT (OUTPATIENT)
Dept: INTERNAL MEDICINE | Facility: CLINIC | Age: 82
End: 2017-03-22

## 2017-03-22 ENCOUNTER — LABORATORY RESULT (OUTPATIENT)
Age: 82
End: 2017-03-22

## 2017-03-22 VITALS — SYSTOLIC BLOOD PRESSURE: 110 MMHG | DIASTOLIC BLOOD PRESSURE: 60 MMHG

## 2017-03-22 VITALS
TEMPERATURE: 98.9 F | WEIGHT: 106 LBS | HEIGHT: 57.5 IN | SYSTOLIC BLOOD PRESSURE: 142 MMHG | BODY MASS INDEX: 22.56 KG/M2 | HEART RATE: 84 BPM | DIASTOLIC BLOOD PRESSURE: 72 MMHG | OXYGEN SATURATION: 97 % | RESPIRATION RATE: 17 BRPM

## 2017-03-22 DIAGNOSIS — Z86.73 PERSONAL HISTORY OF TRANSIENT ISCHEMIC ATTACK (TIA), AND CEREBRAL INFARCTION W/OUT RESIDUAL DEFICITS: ICD-10-CM

## 2017-03-22 DIAGNOSIS — R01.1 CARDIAC MURMUR, UNSPECIFIED: ICD-10-CM

## 2017-03-22 DIAGNOSIS — Z63.4 DISAPPEARANCE AND DEATH OF FAMILY MEMBER: ICD-10-CM

## 2017-03-22 DIAGNOSIS — K21.9 GASTRO-ESOPHAGEAL REFLUX DISEASE W/OUT ESOPHAGITIS: ICD-10-CM

## 2017-03-22 DIAGNOSIS — E78.5 HYPERLIPIDEMIA, UNSPECIFIED: ICD-10-CM

## 2017-03-22 DIAGNOSIS — R13.10 DYSPHAGIA, UNSPECIFIED: ICD-10-CM

## 2017-03-22 DIAGNOSIS — G81.94 HEMIPLEGIA, UNSPECIFIED AFFECTING LEFT NONDOMINANT SIDE: ICD-10-CM

## 2017-03-22 DIAGNOSIS — Z82.3 FAMILY HISTORY OF STROKE: ICD-10-CM

## 2017-03-22 DIAGNOSIS — Z82.49 FAMILY HISTORY OF ISCHEMIC HEART DISEASE AND OTHER DISEASES OF THE CIRCULATORY SYSTEM: ICD-10-CM

## 2017-03-22 DIAGNOSIS — Z87.19 PERSONAL HISTORY OF OTHER DISEASES OF THE DIGESTIVE SYSTEM: ICD-10-CM

## 2017-03-22 DIAGNOSIS — Z86.79 PERSONAL HISTORY OF OTHER DISEASES OF THE CIRCULATORY SYSTEM: ICD-10-CM

## 2017-03-22 DIAGNOSIS — H53.8 OTHER VISUAL DISTURBANCES: ICD-10-CM

## 2017-03-22 DIAGNOSIS — H26.9 UNSPECIFIED CATARACT: ICD-10-CM

## 2017-03-22 RX ORDER — ASPIRIN 81 MG/1
81 TABLET ORAL
Qty: 90 | Refills: 3 | Status: ACTIVE | COMMUNITY
Start: 2017-03-22

## 2017-03-22 RX ORDER — FOLIC ACID/VIT B COMPLEX AND C 0.8 MG
TABLET ORAL
Qty: 30 | Refills: 0 | Status: ACTIVE | COMMUNITY
Start: 2017-02-13

## 2017-03-22 RX ORDER — PANTOPRAZOLE 40 MG/1
40 TABLET, DELAYED RELEASE ORAL
Qty: 30 | Refills: 0 | Status: ACTIVE | COMMUNITY
Start: 2017-02-13

## 2017-03-22 RX ORDER — TRAVOPROST 0.04 MG/ML
0 SOLUTION/ DROPS OPHTHALMIC
Qty: 2 | Refills: 0 | Status: ACTIVE | COMMUNITY
Start: 2017-01-11

## 2017-03-22 SDOH — SOCIAL STABILITY - SOCIAL INSECURITY: DISSAPEARANCE AND DEATH OF FAMILY MEMBER: Z63.4

## 2017-03-23 PROBLEM — Z86.79 HISTORY OF HYPERTENSION: Status: RESOLVED | Noted: 2017-03-22 | Resolved: 2017-03-23

## 2017-03-23 PROBLEM — Z87.19 HISTORY OF PANCREATITIS: Status: RESOLVED | Noted: 2017-03-22 | Resolved: 2017-03-23

## 2017-03-24 ENCOUNTER — MEDICATION RENEWAL (OUTPATIENT)
Age: 82
End: 2017-03-24

## 2017-03-24 DIAGNOSIS — R94.6 ABNORMAL RESULTS OF THYROID FUNCTION STUDIES: ICD-10-CM

## 2017-03-24 LAB
ALBUMIN SERPL ELPH-MCNC: 4.4 G/DL
ALP BLD-CCNC: 162 U/L
ALT SERPL-CCNC: 43 U/L
AMYLASE/CREAT SERPL: 308 U/L
ANION GAP SERPL CALC-SCNC: 25 MMOL/L
AST SERPL-CCNC: 48 U/L
BASOPHILS # BLD AUTO: 0.1 K/UL
BASOPHILS NFR BLD AUTO: 0.7 %
BILIRUB SERPL-MCNC: 0.3 MG/DL
BUN SERPL-MCNC: 75 MG/DL
CALCIUM SERPL-MCNC: 10.1 MG/DL
CHLORIDE SERPL-SCNC: 95 MMOL/L
CHOLEST SERPL-MCNC: 243 MG/DL
CHOLEST/HDLC SERPL: 2.9 RATIO
CO2 SERPL-SCNC: 19 MMOL/L
CREAT SERPL-MCNC: 5 MG/DL
EOSINOPHIL # BLD AUTO: 0.73 K/UL
EOSINOPHIL NFR BLD AUTO: 5.4 %
GLUCOSE SERPL-MCNC: 101 MG/DL
HBA1C MFR BLD HPLC: 4.9 %
HCT VFR BLD CALC: 28.9 %
HDLC SERPL-MCNC: 84 MG/DL
HGB BLD-MCNC: 9.4 G/DL
IMM GRANULOCYTES NFR BLD AUTO: 0.7 %
LDLC SERPL CALC-MCNC: 139 MG/DL
LPL SERPL-CCNC: 174 U/L
LYMPHOCYTES # BLD AUTO: 2.01 K/UL
LYMPHOCYTES NFR BLD AUTO: 14.7 %
MAN DIFF?: NORMAL
MCHC RBC-ENTMCNC: 31.4 PG
MCHC RBC-ENTMCNC: 32.5 GM/DL
MCV RBC AUTO: 96.7 FL
MONOCYTES # BLD AUTO: 1.29 K/UL
MONOCYTES NFR BLD AUTO: 9.5 %
NEUTROPHILS # BLD AUTO: 9.41 K/UL
NEUTROPHILS NFR BLD AUTO: 69 %
PLATELET # BLD AUTO: 230 K/UL
POTASSIUM SERPL-SCNC: 3.9 MMOL/L
PROT SERPL-MCNC: 8.1 G/DL
RBC # BLD: 2.99 M/UL
RBC # FLD: 15.9 %
SODIUM SERPL-SCNC: 139 MMOL/L
TRIGL SERPL-MCNC: 102 MG/DL
TSH SERPL-ACNC: 0.18 UIU/ML
WBC # FLD AUTO: 13.64 K/UL

## 2017-03-24 RX ORDER — MOXIFLOXACIN HYDROCHLORIDE TABLETS, 400 MG 400 MG/1
1 TABLET, FILM COATED ORAL
Qty: 0 | Refills: 0 | DISCHARGE
Start: 2017-03-24 | End: 2017-03-31

## 2017-03-25 ENCOUNTER — RESULT REVIEW (OUTPATIENT)
Age: 82
End: 2017-03-25

## 2017-03-25 DIAGNOSIS — E05.90 THYROTOXICOSIS, UNSPECIFIED W/OUT THYROTOXIC CRISIS OR STORM: ICD-10-CM

## 2017-03-25 LAB
HAV IGG+IGM SER QL: REACTIVE
HBV SURFACE AB SER QL: ABNORMAL
HBV SURFACE AG SER QL: NONREACTIVE
HCV AB SER QL: NONREACTIVE
HCV S/CO RATIO: 0.11 S/CO

## 2017-03-30 DIAGNOSIS — Z20.5 CONTACT WITH AND (SUSPECTED) EXPOSURE TO VIRAL HEPATITIS: ICD-10-CM

## 2017-04-02 ENCOUNTER — INPATIENT (INPATIENT)
Facility: HOSPITAL | Age: 82
LOS: 5 days | Discharge: HOME HEALTH | End: 2017-04-08
Attending: INTERNAL MEDICINE | Admitting: INTERNAL MEDICINE
Payer: MEDICARE

## 2017-04-02 VITALS
OXYGEN SATURATION: 100 % | DIASTOLIC BLOOD PRESSURE: 75 MMHG | HEART RATE: 95 BPM | SYSTOLIC BLOOD PRESSURE: 112 MMHG | RESPIRATION RATE: 18 BRPM | TEMPERATURE: 99 F

## 2017-04-02 DIAGNOSIS — J18.9 PNEUMONIA, UNSPECIFIED ORGANISM: ICD-10-CM

## 2017-04-02 DIAGNOSIS — N18.9 CHRONIC KIDNEY DISEASE, UNSPECIFIED: ICD-10-CM

## 2017-04-02 DIAGNOSIS — Z98.49 CATARACT EXTRACTION STATUS, UNSPECIFIED EYE: Chronic | ICD-10-CM

## 2017-04-02 DIAGNOSIS — Z93.1 GASTROSTOMY STATUS: Chronic | ICD-10-CM

## 2017-04-02 DIAGNOSIS — Z95.828 PRESENCE OF OTHER VASCULAR IMPLANTS AND GRAFTS: Chronic | ICD-10-CM

## 2017-04-02 DIAGNOSIS — M81.0 AGE-RELATED OSTEOPOROSIS WITHOUT CURRENT PATHOLOGICAL FRACTURE: ICD-10-CM

## 2017-04-02 LAB
ALBUMIN SERPL ELPH-MCNC: 4 G/DL — SIGNIFICANT CHANGE UP (ref 3.3–5)
ALP SERPL-CCNC: 103 U/L — SIGNIFICANT CHANGE UP (ref 40–120)
ALT FLD-CCNC: 50 U/L — HIGH (ref 4–33)
APTT BLD: 33.9 SEC — SIGNIFICANT CHANGE UP (ref 27.5–37.4)
AST SERPL-CCNC: 119 U/L — HIGH (ref 4–32)
BASOPHILS # BLD AUTO: 0.03 K/UL — SIGNIFICANT CHANGE UP (ref 0–0.2)
BASOPHILS NFR BLD AUTO: 0.2 % — SIGNIFICANT CHANGE UP (ref 0–2)
BILIRUB SERPL-MCNC: 0.3 MG/DL — SIGNIFICANT CHANGE UP (ref 0.2–1.2)
BUN SERPL-MCNC: 57 MG/DL — HIGH (ref 7–23)
CALCIUM SERPL-MCNC: 9.3 MG/DL — SIGNIFICANT CHANGE UP (ref 8.4–10.5)
CHLORIDE SERPL-SCNC: 93 MMOL/L — LOW (ref 98–107)
CK MB BLD-MCNC: 11.59 NG/ML — HIGH (ref 1–4.7)
CK SERPL-CCNC: 132 U/L — SIGNIFICANT CHANGE UP (ref 25–170)
CO2 SERPL-SCNC: 23 MMOL/L — SIGNIFICANT CHANGE UP (ref 22–31)
CREAT SERPL-MCNC: 4.44 MG/DL — HIGH (ref 0.5–1.3)
EOSINOPHIL # BLD AUTO: 0.27 K/UL — SIGNIFICANT CHANGE UP (ref 0–0.5)
EOSINOPHIL NFR BLD AUTO: 1.7 % — SIGNIFICANT CHANGE UP (ref 0–6)
GLUCOSE SERPL-MCNC: 115 MG/DL — HIGH (ref 70–99)
HCT VFR BLD CALC: 31.4 % — LOW (ref 34.5–45)
HGB BLD-MCNC: 10.1 G/DL — LOW (ref 11.5–15.5)
IMM GRANULOCYTES NFR BLD AUTO: 0.8 % — SIGNIFICANT CHANGE UP (ref 0–1.5)
INR BLD: 0.95 — SIGNIFICANT CHANGE UP (ref 0.88–1.17)
LYMPHOCYTES # BLD AUTO: 1.04 K/UL — SIGNIFICANT CHANGE UP (ref 1–3.3)
LYMPHOCYTES # BLD AUTO: 6.6 % — LOW (ref 13–44)
MCHC RBC-ENTMCNC: 30.8 PG — SIGNIFICANT CHANGE UP (ref 27–34)
MCHC RBC-ENTMCNC: 32.2 % — SIGNIFICANT CHANGE UP (ref 32–36)
MCV RBC AUTO: 95.7 FL — SIGNIFICANT CHANGE UP (ref 80–100)
MONOCYTES # BLD AUTO: 1.49 K/UL — HIGH (ref 0–0.9)
MONOCYTES NFR BLD AUTO: 9.4 % — SIGNIFICANT CHANGE UP (ref 2–14)
NEUTROPHILS # BLD AUTO: 12.85 K/UL — HIGH (ref 1.8–7.4)
NEUTROPHILS NFR BLD AUTO: 81.3 % — HIGH (ref 43–77)
PLATELET # BLD AUTO: 357 K/UL — SIGNIFICANT CHANGE UP (ref 150–400)
PMV BLD: 10.8 FL — SIGNIFICANT CHANGE UP (ref 7–13)
POTASSIUM SERPL-MCNC: SIGNIFICANT CHANGE UP MMOL/L (ref 3.5–5.3)
POTASSIUM SERPL-SCNC: SIGNIFICANT CHANGE UP MMOL/L (ref 3.5–5.3)
PROT SERPL-MCNC: 8.5 G/DL — HIGH (ref 6–8.3)
PROTHROM AB SERPL-ACNC: 10.6 SEC — SIGNIFICANT CHANGE UP (ref 9.8–13.1)
RBC # BLD: 3.28 M/UL — LOW (ref 3.8–5.2)
RBC # FLD: 16.4 % — HIGH (ref 10.3–14.5)
SODIUM SERPL-SCNC: 134 MMOL/L — LOW (ref 135–145)
TROPONIN T SERPL-MCNC: < 0.06 NG/ML — SIGNIFICANT CHANGE UP (ref 0–0.06)
WBC # BLD: 15.8 K/UL — HIGH (ref 3.8–10.5)
WBC # FLD AUTO: 15.8 K/UL — HIGH (ref 3.8–10.5)

## 2017-04-02 PROCEDURE — 71020: CPT | Mod: 26

## 2017-04-02 PROCEDURE — 71250 CT THORAX DX C-: CPT | Mod: 26

## 2017-04-02 RX ORDER — PANTOPRAZOLE SODIUM 20 MG/1
40 TABLET, DELAYED RELEASE ORAL DAILY
Qty: 0 | Refills: 0 | Status: DISCONTINUED | OUTPATIENT
Start: 2017-04-02 | End: 2017-04-08

## 2017-04-02 RX ORDER — CIPROFLOXACIN LACTATE 400MG/40ML
400 VIAL (ML) INTRAVENOUS ONCE
Qty: 0 | Refills: 0 | Status: COMPLETED | OUTPATIENT
Start: 2017-04-02 | End: 2017-04-02

## 2017-04-02 RX ORDER — BRIMONIDINE TARTRATE 2 MG/MG
1 SOLUTION/ DROPS OPHTHALMIC THREE TIMES A DAY
Qty: 0 | Refills: 0 | Status: DISCONTINUED | OUTPATIENT
Start: 2017-04-02 | End: 2017-04-08

## 2017-04-02 RX ORDER — ACETAMINOPHEN 500 MG
325 TABLET ORAL EVERY 6 HOURS
Qty: 0 | Refills: 0 | Status: DISCONTINUED | OUTPATIENT
Start: 2017-04-02 | End: 2017-04-08

## 2017-04-02 RX ORDER — AZTREONAM 2 G
2000 VIAL (EA) INJECTION ONCE
Qty: 0 | Refills: 0 | Status: COMPLETED | OUTPATIENT
Start: 2017-04-02 | End: 2017-04-02

## 2017-04-02 RX ORDER — VANCOMYCIN HCL 1 G
1000 VIAL (EA) INTRAVENOUS ONCE
Qty: 0 | Refills: 0 | Status: COMPLETED | OUTPATIENT
Start: 2017-04-02 | End: 2017-04-03

## 2017-04-02 RX ORDER — MIDODRINE HYDROCHLORIDE 2.5 MG/1
15 TABLET ORAL
Qty: 0 | Refills: 0 | Status: DISCONTINUED | OUTPATIENT
Start: 2017-04-02 | End: 2017-04-08

## 2017-04-02 RX ORDER — SIMVASTATIN 20 MG/1
40 TABLET, FILM COATED ORAL AT BEDTIME
Qty: 0 | Refills: 0 | Status: DISCONTINUED | OUTPATIENT
Start: 2017-04-02 | End: 2017-04-03

## 2017-04-02 RX ORDER — LATANOPROST 0.05 MG/ML
1 SOLUTION/ DROPS OPHTHALMIC; TOPICAL AT BEDTIME
Qty: 0 | Refills: 0 | Status: DISCONTINUED | OUTPATIENT
Start: 2017-04-02 | End: 2017-04-08

## 2017-04-02 RX ORDER — DORZOLAMIDE HYDROCHLORIDE TIMOLOL MALEATE 20; 5 MG/ML; MG/ML
1 SOLUTION/ DROPS OPHTHALMIC
Qty: 0 | Refills: 0 | Status: DISCONTINUED | OUTPATIENT
Start: 2017-04-02 | End: 2017-04-08

## 2017-04-02 RX ADMIN — Medication 100 MILLIGRAM(S): at 20:46

## 2017-04-02 RX ADMIN — Medication 200 MILLIGRAM(S): at 21:10

## 2017-04-02 NOTE — H&P ADULT. - PROBLEM SELECTOR PLAN 2
- renally dose meds  - f/u repeat Cr - renally dose meds  - f/u repeat Cr  - HD T/Th/Sat- renal cs in am  - midodrine half hour before HD

## 2017-04-02 NOTE — ED ADULT TRIAGE NOTE - CHIEF COMPLAINT QUOTE
Pt c/o cough x2 weeks, now having CP. Denies fevers. Saw her PMD last week and was given abx.   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   *EKG abnormal

## 2017-04-02 NOTE — ED PROVIDER NOTE - OBJECTIVE STATEMENT
83yo F PMHx HTN, HLD, CVA, Heart Murmur, GERD, ESRD on dialysis (T, Th, Sat) p/w CC cough/chest pain. Pt. explains she has been experiencing worsening cough over the past 2 weeks. Pt. states that she has always had a chronic cough, however over the past 2 weeks it has been more severe and associated with burning, mid sternal chest pain only when she coughs. Pt. denies fevers, chills, SOB, N/V/D.

## 2017-04-02 NOTE — ED ADULT NURSE NOTE - OBJECTIVE STATEMENT
Break RN: 83 y/o female pt, aox3, accompanied by dtrs, rcvd in rm 8. Pt presents to the ed with c/o cold, productive cough, chest pain r/t coughing x 2 weeks now. Pt describes the pain as midsternal only felt when she coughs. Denies any pain at this time, no SOB. Pt spitting up mucus, as per dtr, r/t throat paralysis from a CVA 27 yrs ago. Pt dialysis patient, t-th-Sat, completed dialysis yesterday, has L chest port, R arm fistula clogged. Pt denies any other complaints, SL placed, labs sent, NAD, will cont to monitor

## 2017-04-02 NOTE — ED PROVIDER NOTE - MEDICAL DECISION MAKING DETAILS
83yo F PMHx HTN, HLD, CVA, Heart Murmur, GERD, ESRD p/w CC worsening cough and associated CP - will send for cbc, cmp, cardiac enzymes, coags, cxr, ekg and reevaluate

## 2017-04-02 NOTE — ED PROVIDER NOTE - ATTENDING CONTRIBUTION TO CARE
I, Stacey Wu M.D. have examined the patient and confirmed the essential components of the history, physical examination, diagnosis, and treatment plan. I agree with the patient's care as documented by the resident and amended herein by me. See note above for complete details of service.  83 yo F c/ multiple PMHx pw chest pain and cough x 2 weeks. Pt's chronic cough has worsened over the past 2 weeks. VS normal, lungs clear, RRR. Plan EKG, labs, CXR. Infectious/cardiac w/up. Supportive measures. Admit.

## 2017-04-02 NOTE — ED ADULT NURSE REASSESSMENT NOTE - NS ED NURSE REASSESS COMMENT FT1
pt resting comfortably, alert and orientedx3. nsr on cm. family at bedside. denies dsicomforts at this time, denies pain sob dizzienss nausea. respirations even unlabored. antibiotic infusing per mar. will monitor.

## 2017-04-02 NOTE — H&P ADULT. - ASSESSMENT
81 y/o F h/o HTN, HLD, CVA, GERD, ESRD (T/Th/Sat), p/w worsening cough x 2 weeks. VS stable. PE ***. Labs with leukocytosis and group.CT w RUL opacity. Admitted to medicine for HCAP 81 y/o F h/o HTN, HLD, CVA, GERD, ESRD (T/Th/Sat), p/w worsening cough x 2 weeks. VS stable. PE unremarkable. Labs with leukocytosis and group.CT w RUL opacity. Admitted to medicine for HCAP 83 y/o F h/o HTN, HLD, CVA, GERD, ESRD (T/Th/Sat), p/w worsening cough x 2 weeks. VS stable. PE w/ RUL crackles. Labs with leukocytosis.CT w RUL opacity. Admitted to medicine for HCAP

## 2017-04-02 NOTE — H&P ADULT. - HISTORY OF PRESENT ILLNESS
81 y/o F h/o HTN, HLD, CVA, GERD, ESRD (T/Th/Sat), p/w worsening cough x 2 weeks    Patient has chronic cough that has worsened over the past two weeks. Cough associated with burning chest pain only when she coughs.     No fevers/chills  No SOB  No N/V/D      ER Course:  CT with RUL opacity/vanc/cipro/aztreonam 83 y/o F h/o HTN, HLD, CVA, GERD, ESRD (T/Th/Sat), s/p PEG, p/w worsening cough x 2 weeks    Patient has chronic cough that has worsened over the past two weeks. Cough associated with burning chest pain only when she coughs.     No fevers/chills  No SOB  No N/V/D      ER Course:  CT with RUL opacity/vanc/cipro/aztreonam

## 2017-04-02 NOTE — H&P ADULT. - RADIOLOGY RESULTS AND INTERPRETATION
Imaging reviewed:  CXR: no acute findings    CT Chest:  Right upper lobe groundglass opacities which may be   inflammatory/infectious in nature. Short-term follow-up to resolution in   4-6 weeks at the appropriate clinical treatment is recommended.

## 2017-04-03 ENCOUNTER — APPOINTMENT (OUTPATIENT)
Dept: CARDIOLOGY | Facility: CLINIC | Age: 82
End: 2017-04-03

## 2017-04-03 DIAGNOSIS — E78.5 HYPERLIPIDEMIA, UNSPECIFIED: ICD-10-CM

## 2017-04-03 DIAGNOSIS — H40.9 UNSPECIFIED GLAUCOMA: ICD-10-CM

## 2017-04-03 DIAGNOSIS — Z41.8 ENCOUNTER FOR OTHER PROCEDURES FOR PURPOSES OTHER THAN REMEDYING HEALTH STATE: ICD-10-CM

## 2017-04-03 DIAGNOSIS — K21.9 GASTRO-ESOPHAGEAL REFLUX DISEASE WITHOUT ESOPHAGITIS: ICD-10-CM

## 2017-04-03 DIAGNOSIS — I63.9 CEREBRAL INFARCTION, UNSPECIFIED: ICD-10-CM

## 2017-04-03 LAB
B PERT DNA SPEC QL NAA+PROBE: SIGNIFICANT CHANGE UP
BASOPHILS # BLD AUTO: 0.03 K/UL — SIGNIFICANT CHANGE UP (ref 0–0.2)
BASOPHILS NFR BLD AUTO: 0.2 % — SIGNIFICANT CHANGE UP (ref 0–2)
BUN SERPL-MCNC: 77 MG/DL — HIGH (ref 7–23)
BUN SERPL-MCNC: 86 MG/DL — HIGH (ref 7–23)
C PNEUM DNA SPEC QL NAA+PROBE: NOT DETECTED — SIGNIFICANT CHANGE UP
CALCIUM SERPL-MCNC: 10.6 MG/DL — HIGH (ref 8.4–10.5)
CALCIUM SERPL-MCNC: 9.8 MG/DL — SIGNIFICANT CHANGE UP (ref 8.4–10.5)
CHLORIDE SERPL-SCNC: 83 MMOL/L — LOW (ref 98–107)
CHLORIDE SERPL-SCNC: 93 MMOL/L — LOW (ref 98–107)
CO2 SERPL-SCNC: 21 MMOL/L — LOW (ref 22–31)
CO2 SERPL-SCNC: 22 MMOL/L — SIGNIFICANT CHANGE UP (ref 22–31)
CREAT SERPL-MCNC: 5.25 MG/DL — HIGH (ref 0.5–1.3)
CREAT SERPL-MCNC: 5.87 MG/DL — HIGH (ref 0.5–1.3)
EOSINOPHIL # BLD AUTO: 0.24 K/UL — SIGNIFICANT CHANGE UP (ref 0–0.5)
EOSINOPHIL NFR BLD AUTO: 1.6 % — SIGNIFICANT CHANGE UP (ref 0–6)
FLUAV H1 2009 PAND RNA SPEC QL NAA+PROBE: NOT DETECTED — SIGNIFICANT CHANGE UP
FLUAV H1 RNA SPEC QL NAA+PROBE: NOT DETECTED — SIGNIFICANT CHANGE UP
FLUAV H3 RNA SPEC QL NAA+PROBE: NOT DETECTED — SIGNIFICANT CHANGE UP
FLUAV SUBTYP SPEC NAA+PROBE: SIGNIFICANT CHANGE UP
FLUBV RNA SPEC QL NAA+PROBE: NOT DETECTED — SIGNIFICANT CHANGE UP
GLUCOSE SERPL-MCNC: 105 MG/DL — HIGH (ref 70–99)
GLUCOSE SERPL-MCNC: 431 MG/DL — HIGH (ref 70–99)
HADV DNA SPEC QL NAA+PROBE: NOT DETECTED — SIGNIFICANT CHANGE UP
HCOV 229E RNA SPEC QL NAA+PROBE: NOT DETECTED — SIGNIFICANT CHANGE UP
HCOV HKU1 RNA SPEC QL NAA+PROBE: NOT DETECTED — SIGNIFICANT CHANGE UP
HCOV NL63 RNA SPEC QL NAA+PROBE: NOT DETECTED — SIGNIFICANT CHANGE UP
HCOV OC43 RNA SPEC QL NAA+PROBE: NOT DETECTED — SIGNIFICANT CHANGE UP
HCT VFR BLD CALC: 25.9 % — LOW (ref 34.5–45)
HGB BLD-MCNC: 8.3 G/DL — LOW (ref 11.5–15.5)
HMPV RNA SPEC QL NAA+PROBE: NOT DETECTED — SIGNIFICANT CHANGE UP
HPIV1 RNA SPEC QL NAA+PROBE: NOT DETECTED — SIGNIFICANT CHANGE UP
HPIV2 RNA SPEC QL NAA+PROBE: NOT DETECTED — SIGNIFICANT CHANGE UP
HPIV3 RNA SPEC QL NAA+PROBE: NOT DETECTED — SIGNIFICANT CHANGE UP
HPIV4 RNA SPEC QL NAA+PROBE: NOT DETECTED — SIGNIFICANT CHANGE UP
IMM GRANULOCYTES NFR BLD AUTO: 0.5 % — SIGNIFICANT CHANGE UP (ref 0–1.5)
LYMPHOCYTES # BLD AUTO: 1.72 K/UL — SIGNIFICANT CHANGE UP (ref 1–3.3)
LYMPHOCYTES # BLD AUTO: 11.7 % — LOW (ref 13–44)
M PNEUMO DNA SPEC QL NAA+PROBE: NOT DETECTED — SIGNIFICANT CHANGE UP
MCHC RBC-ENTMCNC: 30.7 PG — SIGNIFICANT CHANGE UP (ref 27–34)
MCHC RBC-ENTMCNC: 32 % — SIGNIFICANT CHANGE UP (ref 32–36)
MCV RBC AUTO: 95.9 FL — SIGNIFICANT CHANGE UP (ref 80–100)
MONOCYTES # BLD AUTO: 0.44 K/UL — SIGNIFICANT CHANGE UP (ref 0–0.9)
MONOCYTES NFR BLD AUTO: 3 % — SIGNIFICANT CHANGE UP (ref 2–14)
NEUTROPHILS # BLD AUTO: 12.21 K/UL — HIGH (ref 1.8–7.4)
NEUTROPHILS NFR BLD AUTO: 83 % — HIGH (ref 43–77)
PLATELET # BLD AUTO: 330 K/UL — SIGNIFICANT CHANGE UP (ref 150–400)
PMV BLD: 10.9 FL — SIGNIFICANT CHANGE UP (ref 7–13)
POTASSIUM SERPL-MCNC: 2.7 MMOL/L — CRITICAL LOW (ref 3.5–5.3)
POTASSIUM SERPL-MCNC: 3 MMOL/L — LOW (ref 3.5–5.3)
POTASSIUM SERPL-SCNC: 2.7 MMOL/L — CRITICAL LOW (ref 3.5–5.3)
POTASSIUM SERPL-SCNC: 3 MMOL/L — LOW (ref 3.5–5.3)
RBC # BLD: 2.7 M/UL — LOW (ref 3.8–5.2)
RBC # FLD: 16.5 % — HIGH (ref 10.3–14.5)
RSV RNA SPEC QL NAA+PROBE: NOT DETECTED — SIGNIFICANT CHANGE UP
RV+EV RNA SPEC QL NAA+PROBE: NOT DETECTED — SIGNIFICANT CHANGE UP
SODIUM SERPL-SCNC: 128 MMOL/L — LOW (ref 135–145)
SODIUM SERPL-SCNC: 138 MMOL/L — SIGNIFICANT CHANGE UP (ref 135–145)
SPECIMEN SOURCE: SIGNIFICANT CHANGE UP
SPECIMEN SOURCE: SIGNIFICANT CHANGE UP
VANCOMYCIN FLD-MCNC: 19.4 UG/ML — SIGNIFICANT CHANGE UP
WBC # BLD: 14.71 K/UL — HIGH (ref 3.8–10.5)
WBC # FLD AUTO: 14.71 K/UL — HIGH (ref 3.8–10.5)

## 2017-04-03 PROCEDURE — 93010 ELECTROCARDIOGRAM REPORT: CPT

## 2017-04-03 RX ORDER — AZTREONAM 2 G
500 VIAL (EA) INJECTION EVERY 12 HOURS
Qty: 0 | Refills: 0 | Status: DISCONTINUED | OUTPATIENT
Start: 2017-04-03 | End: 2017-04-07

## 2017-04-03 RX ORDER — SIMVASTATIN 20 MG/1
40 TABLET, FILM COATED ORAL AT BEDTIME
Qty: 0 | Refills: 0 | Status: DISCONTINUED | OUTPATIENT
Start: 2017-04-03 | End: 2017-04-08

## 2017-04-03 RX ORDER — HEPARIN SODIUM 5000 [USP'U]/ML
5000 INJECTION INTRAVENOUS; SUBCUTANEOUS EVERY 12 HOURS
Qty: 0 | Refills: 0 | Status: DISCONTINUED | OUTPATIENT
Start: 2017-04-03 | End: 2017-04-08

## 2017-04-03 RX ORDER — ERYTHROPOIETIN 10000 [IU]/ML
10000 INJECTION, SOLUTION INTRAVENOUS; SUBCUTANEOUS
Qty: 0 | Refills: 0 | Status: DISCONTINUED | OUTPATIENT
Start: 2017-04-03 | End: 2017-04-08

## 2017-04-03 RX ORDER — ONDANSETRON 8 MG/1
4 TABLET, FILM COATED ORAL ONCE
Qty: 0 | Refills: 0 | Status: COMPLETED | OUTPATIENT
Start: 2017-04-03 | End: 2017-04-03

## 2017-04-03 RX ORDER — POTASSIUM CHLORIDE 20 MEQ
40 PACKET (EA) ORAL ONCE
Qty: 0 | Refills: 0 | Status: COMPLETED | OUTPATIENT
Start: 2017-04-03 | End: 2017-04-03

## 2017-04-03 RX ADMIN — SIMVASTATIN 40 MILLIGRAM(S): 20 TABLET, FILM COATED ORAL at 22:20

## 2017-04-03 RX ADMIN — BRIMONIDINE TARTRATE 1 DROP(S): 2 SOLUTION/ DROPS OPHTHALMIC at 13:20

## 2017-04-03 RX ADMIN — Medication 50 MILLIGRAM(S): at 05:25

## 2017-04-03 RX ADMIN — Medication 1 TABLET(S): at 14:16

## 2017-04-03 RX ADMIN — PANTOPRAZOLE SODIUM 40 MILLIGRAM(S): 20 TABLET, DELAYED RELEASE ORAL at 13:20

## 2017-04-03 RX ADMIN — DORZOLAMIDE HYDROCHLORIDE TIMOLOL MALEATE 1 DROP(S): 20; 5 SOLUTION/ DROPS OPHTHALMIC at 05:25

## 2017-04-03 RX ADMIN — Medication 325 MILLIGRAM(S): at 18:24

## 2017-04-03 RX ADMIN — Medication 100 MILLIGRAM(S): at 22:20

## 2017-04-03 RX ADMIN — Medication 40 MILLIEQUIVALENT(S): at 13:21

## 2017-04-03 RX ADMIN — Medication 100 MILLIGRAM(S): at 05:25

## 2017-04-03 RX ADMIN — BRIMONIDINE TARTRATE 1 DROP(S): 2 SOLUTION/ DROPS OPHTHALMIC at 22:20

## 2017-04-03 RX ADMIN — LATANOPROST 1 DROP(S): 0.05 SOLUTION/ DROPS OPHTHALMIC; TOPICAL at 23:26

## 2017-04-03 RX ADMIN — Medication 325 MILLIGRAM(S): at 17:24

## 2017-04-03 RX ADMIN — DORZOLAMIDE HYDROCHLORIDE TIMOLOL MALEATE 1 DROP(S): 20; 5 SOLUTION/ DROPS OPHTHALMIC at 17:24

## 2017-04-03 RX ADMIN — BRIMONIDINE TARTRATE 1 DROP(S): 2 SOLUTION/ DROPS OPHTHALMIC at 05:25

## 2017-04-03 RX ADMIN — ONDANSETRON 4 MILLIGRAM(S): 8 TABLET, FILM COATED ORAL at 22:20

## 2017-04-03 RX ADMIN — Medication 250 MILLIGRAM(S): at 02:43

## 2017-04-03 RX ADMIN — HEPARIN SODIUM 5000 UNIT(S): 5000 INJECTION INTRAVENOUS; SUBCUTANEOUS at 17:24

## 2017-04-03 RX ADMIN — HEPARIN SODIUM 5000 UNIT(S): 5000 INJECTION INTRAVENOUS; SUBCUTANEOUS at 05:25

## 2017-04-03 RX ADMIN — Medication 50 MILLIGRAM(S): at 18:41

## 2017-04-03 NOTE — DIETITIAN INITIAL EVALUATION ADULT. - NS AS NUTRI INTERV COLLABORAT
Collaboration with other nutrition professionals/1. RD advised to Pt to f/u with an outpatient RD upon discharge as discussed with ADS and pt's daughter.

## 2017-04-03 NOTE — DIETITIAN INITIAL EVALUATION ADULT. - PROBLEM SELECTOR PLAN 2
- renally dose meds  - f/u repeat Cr  - HD T/Th/Sat- renal cs in am  - midodrine half hour before HD

## 2017-04-03 NOTE — DIETITIAN INITIAL EVALUATION ADULT. - NS AS NUTRI INTERV ENTERAL NUTRITION
1. As discussed with ADS, suggested change TF order to bolus feeds of Nepro via PEG at 160mL every 4 hour. This will provide total volume of 960mL, 1728kcal and 77.8g pro. At 53kg, pt is receiving ~32kcal/kg and 1.4g pro/kg which meets her estimated caloric needs. 2. Monitor for refeeding, TF tolerance and residual 3. Monitor weights, labs, BM's, skin integrity. 4. Further adjustments to rate/volume/duration/free water provision of enteral feeds dependent on long term monitoring of patient's tolerance and needs

## 2017-04-03 NOTE — DIETITIAN INITIAL EVALUATION ADULT. - SOURCE
Spoke to daughter Aimee via phone, ADS, RN, Review of the patient's medical chart/other (specify)/patient/family/significant other

## 2017-04-03 NOTE — DIETITIAN INITIAL EVALUATION ADULT. - OTHER INFO
Nutrition consult received for chewing/swallowing difficulty, dialysis. Met with pt and RN by bedside. At time of interview, pt being fed via PEG 4 cans of Nepro/day per order, tolerating well but RN reported pt c/o hunger. RDN spoke with Pt's daughter via phone who feeds her. Per daughter, Pt has been on Nepro (~2 cans/day), and also mixing pureed foods into the tube feed (PEG) with Nepro, which pt is tolerating it well but with occasional diarrhea PTA. Upon asking when or how the regimen at home started, daughter responded she does not know. Her father used to feed this way and now she is following the same. Pureed foods via PEG included: cereal, egg, with 1 can of Nepro for breakfast; and lunch/dinner: chicken, pasta/potato and carrot. Daughter reported, there’s a RDN at dialysis, however it is unclear how often pt is seen by RDN at dialysis. Suggested outpatient RDN follow up to daughter, which she agreed. Of note, per previous RD note 2/13/17, Pt weighed 43.8kg at admission, and pt  currently weighs 53.5kg at this admission, which is about 10 lbs weight gain since last admission. Pt and her daughter reports pt usual body 100lbs, denies any weight changes. Patient denies any nausea/vomiting/diarrhea/constipation. Case discussed with ADS.

## 2017-04-03 NOTE — DIETITIAN INITIAL EVALUATION ADULT. - ENERGY NEEDS
Current weight 117.7lbs Ht 5'0" BMI 23kg/m2  Estimated Protein needs: 53.5kg (1-1.5g/kg)= 53.5-80.25g pro

## 2017-04-04 LAB
ALBUMIN SERPL ELPH-MCNC: 3.6 G/DL — SIGNIFICANT CHANGE UP (ref 3.3–5)
ALP SERPL-CCNC: 99 U/L — SIGNIFICANT CHANGE UP (ref 40–120)
ALT FLD-CCNC: 29 U/L — SIGNIFICANT CHANGE UP (ref 4–33)
AST SERPL-CCNC: 34 U/L — HIGH (ref 4–32)
BILIRUB SERPL-MCNC: 0.3 MG/DL — SIGNIFICANT CHANGE UP (ref 0.2–1.2)
BUN SERPL-MCNC: 119 MG/DL — HIGH (ref 7–23)
CALCIUM SERPL-MCNC: 10.6 MG/DL — HIGH (ref 8.4–10.5)
CHLORIDE SERPL-SCNC: 94 MMOL/L — LOW (ref 98–107)
CO2 SERPL-SCNC: 21 MMOL/L — LOW (ref 22–31)
CREAT SERPL-MCNC: 7.27 MG/DL — HIGH (ref 0.5–1.3)
GLUCOSE SERPL-MCNC: 121 MG/DL — HIGH (ref 70–99)
POTASSIUM SERPL-MCNC: 3.6 MMOL/L — SIGNIFICANT CHANGE UP (ref 3.5–5.3)
POTASSIUM SERPL-SCNC: 3.6 MMOL/L — SIGNIFICANT CHANGE UP (ref 3.5–5.3)
PROT SERPL-MCNC: 6.7 G/DL — SIGNIFICANT CHANGE UP (ref 6–8.3)
SODIUM SERPL-SCNC: 139 MMOL/L — SIGNIFICANT CHANGE UP (ref 135–145)
VANCOMYCIN FLD-MCNC: 17.8 UG/ML — SIGNIFICANT CHANGE UP

## 2017-04-04 PROCEDURE — 93010 ELECTROCARDIOGRAM REPORT: CPT

## 2017-04-04 RX ORDER — SODIUM CHLORIDE 9 MG/ML
250 INJECTION INTRAMUSCULAR; INTRAVENOUS; SUBCUTANEOUS ONCE
Qty: 0 | Refills: 0 | Status: COMPLETED | OUTPATIENT
Start: 2017-04-04 | End: 2017-04-04

## 2017-04-04 RX ORDER — ONDANSETRON 8 MG/1
4 TABLET, FILM COATED ORAL ONCE
Qty: 0 | Refills: 0 | Status: COMPLETED | OUTPATIENT
Start: 2017-04-04 | End: 2017-04-04

## 2017-04-04 RX ORDER — VANCOMYCIN HCL 1 G
1000 VIAL (EA) INTRAVENOUS ONCE
Qty: 0 | Refills: 0 | Status: COMPLETED | OUTPATIENT
Start: 2017-04-04 | End: 2017-04-04

## 2017-04-04 RX ORDER — MIDODRINE HYDROCHLORIDE 2.5 MG/1
10 TABLET ORAL ONCE
Qty: 0 | Refills: 0 | Status: COMPLETED | OUTPATIENT
Start: 2017-04-04 | End: 2017-04-04

## 2017-04-04 RX ADMIN — HEPARIN SODIUM 5000 UNIT(S): 5000 INJECTION INTRAVENOUS; SUBCUTANEOUS at 06:03

## 2017-04-04 RX ADMIN — HEPARIN SODIUM 5000 UNIT(S): 5000 INJECTION INTRAVENOUS; SUBCUTANEOUS at 18:11

## 2017-04-04 RX ADMIN — SODIUM CHLORIDE 500 MILLILITER(S): 9 INJECTION INTRAMUSCULAR; INTRAVENOUS; SUBCUTANEOUS at 15:33

## 2017-04-04 RX ADMIN — Medication 1 TABLET(S): at 12:03

## 2017-04-04 RX ADMIN — BRIMONIDINE TARTRATE 1 DROP(S): 2 SOLUTION/ DROPS OPHTHALMIC at 13:21

## 2017-04-04 RX ADMIN — Medication 50 MILLIGRAM(S): at 18:46

## 2017-04-04 RX ADMIN — MIDODRINE HYDROCHLORIDE 10 MILLIGRAM(S): 2.5 TABLET ORAL at 18:46

## 2017-04-04 RX ADMIN — LATANOPROST 1 DROP(S): 0.05 SOLUTION/ DROPS OPHTHALMIC; TOPICAL at 21:57

## 2017-04-04 RX ADMIN — Medication 250 MILLIGRAM(S): at 12:04

## 2017-04-04 RX ADMIN — BRIMONIDINE TARTRATE 1 DROP(S): 2 SOLUTION/ DROPS OPHTHALMIC at 21:57

## 2017-04-04 RX ADMIN — ONDANSETRON 4 MILLIGRAM(S): 8 TABLET, FILM COATED ORAL at 18:11

## 2017-04-04 RX ADMIN — ERYTHROPOIETIN 10000 UNIT(S): 10000 INJECTION, SOLUTION INTRAVENOUS; SUBCUTANEOUS at 07:20

## 2017-04-04 RX ADMIN — DORZOLAMIDE HYDROCHLORIDE TIMOLOL MALEATE 1 DROP(S): 20; 5 SOLUTION/ DROPS OPHTHALMIC at 06:03

## 2017-04-04 RX ADMIN — SODIUM CHLORIDE 500 MILLILITER(S): 9 INJECTION INTRAMUSCULAR; INTRAVENOUS; SUBCUTANEOUS at 17:04

## 2017-04-04 RX ADMIN — SIMVASTATIN 40 MILLIGRAM(S): 20 TABLET, FILM COATED ORAL at 21:57

## 2017-04-04 RX ADMIN — BRIMONIDINE TARTRATE 1 DROP(S): 2 SOLUTION/ DROPS OPHTHALMIC at 06:03

## 2017-04-04 RX ADMIN — MIDODRINE HYDROCHLORIDE 15 MILLIGRAM(S): 2.5 TABLET ORAL at 07:20

## 2017-04-04 RX ADMIN — PANTOPRAZOLE SODIUM 40 MILLIGRAM(S): 20 TABLET, DELAYED RELEASE ORAL at 06:04

## 2017-04-04 RX ADMIN — DORZOLAMIDE HYDROCHLORIDE TIMOLOL MALEATE 1 DROP(S): 20; 5 SOLUTION/ DROPS OPHTHALMIC at 18:12

## 2017-04-05 ENCOUNTER — APPOINTMENT (OUTPATIENT)
Dept: INTERNAL MEDICINE | Facility: CLINIC | Age: 82
End: 2017-04-05

## 2017-04-05 LAB
ALBUMIN SERPL ELPH-MCNC: 3.5 G/DL — SIGNIFICANT CHANGE UP (ref 3.3–5)
ALP SERPL-CCNC: 106 U/L — SIGNIFICANT CHANGE UP (ref 40–120)
ALT FLD-CCNC: 27 U/L — SIGNIFICANT CHANGE UP (ref 4–33)
AST SERPL-CCNC: 33 U/L — HIGH (ref 4–32)
BASOPHILS # BLD AUTO: 0.06 K/UL — SIGNIFICANT CHANGE UP (ref 0–0.2)
BASOPHILS NFR BLD AUTO: 0.4 % — SIGNIFICANT CHANGE UP (ref 0–2)
BILIRUB SERPL-MCNC: 0.3 MG/DL — SIGNIFICANT CHANGE UP (ref 0.2–1.2)
BUN SERPL-MCNC: 54 MG/DL — HIGH (ref 7–23)
CALCIUM SERPL-MCNC: 9.4 MG/DL — SIGNIFICANT CHANGE UP (ref 8.4–10.5)
CHLORIDE SERPL-SCNC: 97 MMOL/L — LOW (ref 98–107)
CO2 SERPL-SCNC: 25 MMOL/L — SIGNIFICANT CHANGE UP (ref 22–31)
CREAT SERPL-MCNC: 3.86 MG/DL — HIGH (ref 0.5–1.3)
EOSINOPHIL # BLD AUTO: 0.46 K/UL — SIGNIFICANT CHANGE UP (ref 0–0.5)
EOSINOPHIL NFR BLD AUTO: 3 % — SIGNIFICANT CHANGE UP (ref 0–6)
GLUCOSE SERPL-MCNC: 76 MG/DL — SIGNIFICANT CHANGE UP (ref 70–99)
HCT VFR BLD CALC: 26.8 % — LOW (ref 34.5–45)
HGB BLD-MCNC: 8.4 G/DL — LOW (ref 11.5–15.5)
IMM GRANULOCYTES NFR BLD AUTO: 0.3 % — SIGNIFICANT CHANGE UP (ref 0–1.5)
LYMPHOCYTES # BLD AUTO: 1.01 K/UL — SIGNIFICANT CHANGE UP (ref 1–3.3)
LYMPHOCYTES # BLD AUTO: 6.6 % — LOW (ref 13–44)
MCHC RBC-ENTMCNC: 30.8 PG — SIGNIFICANT CHANGE UP (ref 27–34)
MCHC RBC-ENTMCNC: 31.3 % — LOW (ref 32–36)
MCV RBC AUTO: 98.2 FL — SIGNIFICANT CHANGE UP (ref 80–100)
MONOCYTES # BLD AUTO: 1.47 K/UL — HIGH (ref 0–0.9)
MONOCYTES NFR BLD AUTO: 9.6 % — SIGNIFICANT CHANGE UP (ref 2–14)
NEUTROPHILS # BLD AUTO: 12.26 K/UL — HIGH (ref 1.8–7.4)
NEUTROPHILS NFR BLD AUTO: 80.1 % — HIGH (ref 43–77)
PLATELET # BLD AUTO: 304 K/UL — SIGNIFICANT CHANGE UP (ref 150–400)
PMV BLD: 11 FL — SIGNIFICANT CHANGE UP (ref 7–13)
POTASSIUM SERPL-MCNC: 3.4 MMOL/L — LOW (ref 3.5–5.3)
POTASSIUM SERPL-SCNC: 3.4 MMOL/L — LOW (ref 3.5–5.3)
PROT SERPL-MCNC: 6.6 G/DL — SIGNIFICANT CHANGE UP (ref 6–8.3)
RBC # BLD: 2.73 M/UL — LOW (ref 3.8–5.2)
RBC # FLD: 16.5 % — HIGH (ref 10.3–14.5)
SODIUM SERPL-SCNC: 141 MMOL/L — SIGNIFICANT CHANGE UP (ref 135–145)
WBC # BLD: 15.31 K/UL — HIGH (ref 3.8–10.5)
WBC # FLD AUTO: 15.31 K/UL — HIGH (ref 3.8–10.5)

## 2017-04-05 RX ORDER — MIDODRINE HYDROCHLORIDE 2.5 MG/1
10 TABLET ORAL ONCE
Qty: 0 | Refills: 0 | Status: COMPLETED | OUTPATIENT
Start: 2017-04-05 | End: 2017-04-05

## 2017-04-05 RX ADMIN — HEPARIN SODIUM 5000 UNIT(S): 5000 INJECTION INTRAVENOUS; SUBCUTANEOUS at 06:29

## 2017-04-05 RX ADMIN — BRIMONIDINE TARTRATE 1 DROP(S): 2 SOLUTION/ DROPS OPHTHALMIC at 06:23

## 2017-04-05 RX ADMIN — Medication 1 TABLET(S): at 11:11

## 2017-04-05 RX ADMIN — DORZOLAMIDE HYDROCHLORIDE TIMOLOL MALEATE 1 DROP(S): 20; 5 SOLUTION/ DROPS OPHTHALMIC at 06:23

## 2017-04-05 RX ADMIN — SIMVASTATIN 40 MILLIGRAM(S): 20 TABLET, FILM COATED ORAL at 21:46

## 2017-04-05 RX ADMIN — HEPARIN SODIUM 5000 UNIT(S): 5000 INJECTION INTRAVENOUS; SUBCUTANEOUS at 17:38

## 2017-04-05 RX ADMIN — Medication 50 MILLIGRAM(S): at 06:58

## 2017-04-05 RX ADMIN — PANTOPRAZOLE SODIUM 40 MILLIGRAM(S): 20 TABLET, DELAYED RELEASE ORAL at 06:23

## 2017-04-05 RX ADMIN — DORZOLAMIDE HYDROCHLORIDE TIMOLOL MALEATE 1 DROP(S): 20; 5 SOLUTION/ DROPS OPHTHALMIC at 17:38

## 2017-04-05 RX ADMIN — Medication 100 MILLIGRAM(S): at 06:23

## 2017-04-05 RX ADMIN — Medication 50 MILLIGRAM(S): at 17:38

## 2017-04-05 RX ADMIN — BRIMONIDINE TARTRATE 1 DROP(S): 2 SOLUTION/ DROPS OPHTHALMIC at 13:00

## 2017-04-05 RX ADMIN — LATANOPROST 1 DROP(S): 0.05 SOLUTION/ DROPS OPHTHALMIC; TOPICAL at 21:18

## 2017-04-05 RX ADMIN — MIDODRINE HYDROCHLORIDE 10 MILLIGRAM(S): 2.5 TABLET ORAL at 21:46

## 2017-04-05 RX ADMIN — BRIMONIDINE TARTRATE 1 DROP(S): 2 SOLUTION/ DROPS OPHTHALMIC at 21:18
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
## 2017-04-05 NOTE — ADVANCED PRACTICE NURSE CONSULT - RECOMMEDATIONS
Sween 24 to bilateral extremities daily.     Right Upper chest wall- gentle cleanse with NS. Apply Liquid barrier film daily.      MAD/IAD-cleanse sacral gluteal folds and bilateral buttocks with perineal spray. Pat dry. Apply Critic-Aide clear moisture barrier cream twice a day and PRN with episodes of incontinence    Peritubular skin- Cleanse q shift with NS, apply antifungal powder, brush away excess, pat with liquid barrier film. Apply thin foam  dressing without border (Mepilex Lite)- cut to mid dressing with a 'Y' shape.   Secondary securement to abdomen taping in 'H' fashion with Steri-strips.     Continue low air loss bed therapy, continue heel elevation with Z-flex fluidized positioning boots, continue to turn & reposition q2h with Z-sloan fluidized positioning device, soft pillow between bony prominences, continue moisture management with barrier creams as recommended above & single breathable pad, continue measures to decrease friction/shear/pressure. Continue with nutritional support as per dietary/orders. Education provided to pt. regarding topical management.   Plan discussed with patient, bedside nurse and primary team.   Please contact Wound Care Service Line if we can be of further assistance (ext [phone]).
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
## 2017-04-06 DIAGNOSIS — J84.10 PULMONARY FIBROSIS, UNSPECIFIED: ICD-10-CM

## 2017-04-06 DIAGNOSIS — R91.8 OTHER NONSPECIFIC ABNORMAL FINDING OF LUNG FIELD: ICD-10-CM

## 2017-04-06 LAB
ALBUMIN SERPL ELPH-MCNC: 3.1 G/DL — LOW (ref 3.3–5)
ALBUMIN SERPL ELPH-MCNC: 3.2 G/DL — LOW (ref 3.3–5)
ALP SERPL-CCNC: 95 U/L — SIGNIFICANT CHANGE UP (ref 40–120)
ALP SERPL-CCNC: 95 U/L — SIGNIFICANT CHANGE UP (ref 40–120)
ALT FLD-CCNC: 21 U/L — SIGNIFICANT CHANGE UP (ref 4–33)
ALT FLD-CCNC: 25 U/L — SIGNIFICANT CHANGE UP (ref 4–33)
AST SERPL-CCNC: 25 U/L — SIGNIFICANT CHANGE UP (ref 4–32)
AST SERPL-CCNC: 27 U/L — SIGNIFICANT CHANGE UP (ref 4–32)
BASOPHILS # BLD AUTO: 0.05 K/UL — SIGNIFICANT CHANGE UP (ref 0–0.2)
BASOPHILS NFR BLD AUTO: 1.9 % — SIGNIFICANT CHANGE UP (ref 0–2)
BASOPHILS NFR SPEC: 1 % — SIGNIFICANT CHANGE UP (ref 0–2)
BILIRUB SERPL-MCNC: 0.2 MG/DL — SIGNIFICANT CHANGE UP (ref 0.2–1.2)
BILIRUB SERPL-MCNC: 0.3 MG/DL — SIGNIFICANT CHANGE UP (ref 0.2–1.2)
BUN SERPL-MCNC: 16 MG/DL — SIGNIFICANT CHANGE UP (ref 7–23)
BUN SERPL-MCNC: 28 MG/DL — HIGH (ref 7–23)
BUN SERPL-MCNC: 64 MG/DL — HIGH (ref 7–23)
CALCIUM SERPL-MCNC: 7.6 MG/DL — LOW (ref 8.4–10.5)
CALCIUM SERPL-MCNC: 8.5 MG/DL — SIGNIFICANT CHANGE UP (ref 8.4–10.5)
CALCIUM SERPL-MCNC: 9 MG/DL — SIGNIFICANT CHANGE UP (ref 8.4–10.5)
CHLORIDE SERPL-SCNC: 103 MMOL/L — SIGNIFICANT CHANGE UP (ref 98–107)
CHLORIDE SERPL-SCNC: 105 MMOL/L — SIGNIFICANT CHANGE UP (ref 98–107)
CHLORIDE SERPL-SCNC: 98 MMOL/L — SIGNIFICANT CHANGE UP (ref 98–107)
CO2 SERPL-SCNC: 26 MMOL/L — SIGNIFICANT CHANGE UP (ref 22–31)
CO2 SERPL-SCNC: 27 MMOL/L — SIGNIFICANT CHANGE UP (ref 22–31)
CO2 SERPL-SCNC: 29 MMOL/L — SIGNIFICANT CHANGE UP (ref 22–31)
CREAT SERPL-MCNC: 1.32 MG/DL — HIGH (ref 0.5–1.3)
CREAT SERPL-MCNC: 2.57 MG/DL — HIGH (ref 0.5–1.3)
CREAT SERPL-MCNC: 4.22 MG/DL — HIGH (ref 0.5–1.3)
EOSINOPHIL # BLD AUTO: 0.32 K/UL — SIGNIFICANT CHANGE UP (ref 0–0.5)
EOSINOPHIL NFR BLD AUTO: 12 % — HIGH (ref 0–6)
EOSINOPHIL NFR FLD: 16 % — HIGH (ref 0–6)
GLUCOSE SERPL-MCNC: 106 MG/DL — HIGH (ref 70–99)
GLUCOSE SERPL-MCNC: 107 MG/DL — HIGH (ref 70–99)
GLUCOSE SERPL-MCNC: 74 MG/DL — SIGNIFICANT CHANGE UP (ref 70–99)
HCT VFR BLD CALC: 22.5 % — LOW (ref 34.5–45)
HCT VFR BLD CALC: 23.5 % — LOW (ref 34.5–45)
HGB BLD-MCNC: 7.1 G/DL — LOW (ref 11.5–15.5)
HGB BLD-MCNC: 7.4 G/DL — LOW (ref 11.5–15.5)
HYPOCHROMIA BLD QL: SLIGHT — SIGNIFICANT CHANGE UP
IMM GRANULOCYTES NFR BLD AUTO: 0.4 % — SIGNIFICANT CHANGE UP (ref 0–1.5)
LYMPHOCYTES # BLD AUTO: 0.71 K/UL — LOW (ref 1–3.3)
LYMPHOCYTES # BLD AUTO: 26.6 % — SIGNIFICANT CHANGE UP (ref 13–44)
LYMPHOCYTES NFR SPEC AUTO: 31 % — SIGNIFICANT CHANGE UP (ref 13–44)
MACROCYTES BLD QL: SLIGHT — SIGNIFICANT CHANGE UP
MAGNESIUM SERPL-MCNC: 2.2 MG/DL — SIGNIFICANT CHANGE UP (ref 1.6–2.6)
MANUAL SMEAR VERIFICATION: SIGNIFICANT CHANGE UP
MCHC RBC-ENTMCNC: 30.3 PG — SIGNIFICANT CHANGE UP (ref 27–34)
MCHC RBC-ENTMCNC: 30.9 PG — SIGNIFICANT CHANGE UP (ref 27–34)
MCHC RBC-ENTMCNC: 31.5 % — LOW (ref 32–36)
MCHC RBC-ENTMCNC: 31.6 % — LOW (ref 32–36)
MCV RBC AUTO: 96.3 FL — SIGNIFICANT CHANGE UP (ref 80–100)
MCV RBC AUTO: 97.8 FL — SIGNIFICANT CHANGE UP (ref 80–100)
MONOCYTES # BLD AUTO: 0.13 K/UL — SIGNIFICANT CHANGE UP (ref 0–0.9)
MONOCYTES NFR BLD AUTO: 4.9 % — SIGNIFICANT CHANGE UP (ref 2–14)
MONOCYTES NFR BLD: 6 % — SIGNIFICANT CHANGE UP (ref 2–9)
NEUTROPHIL AB SER-ACNC: 44 % — SIGNIFICANT CHANGE UP (ref 43–77)
NEUTROPHILS # BLD AUTO: 1.45 K/UL — LOW (ref 1.8–7.4)
NEUTROPHILS NFR BLD AUTO: 54.2 % — SIGNIFICANT CHANGE UP (ref 43–77)
PHOSPHATE SERPL-MCNC: 1.8 MG/DL — LOW (ref 2.5–4.5)
PLATELET # BLD AUTO: 256 K/UL — SIGNIFICANT CHANGE UP (ref 150–400)
PLATELET # BLD AUTO: 269 K/UL — SIGNIFICANT CHANGE UP (ref 150–400)
PLATELET COUNT - ESTIMATE: NORMAL — SIGNIFICANT CHANGE UP
PMV BLD: 11.1 FL — SIGNIFICANT CHANGE UP (ref 7–13)
PMV BLD: 11.3 FL — SIGNIFICANT CHANGE UP (ref 7–13)
POTASSIUM SERPL-MCNC: 2.3 MMOL/L — CRITICAL LOW (ref 3.5–5.3)
POTASSIUM SERPL-MCNC: 2.6 MMOL/L — CRITICAL LOW (ref 3.5–5.3)
POTASSIUM SERPL-MCNC: 4.4 MMOL/L — SIGNIFICANT CHANGE UP (ref 3.5–5.3)
POTASSIUM SERPL-SCNC: 2.3 MMOL/L — CRITICAL LOW (ref 3.5–5.3)
POTASSIUM SERPL-SCNC: 2.6 MMOL/L — CRITICAL LOW (ref 3.5–5.3)
POTASSIUM SERPL-SCNC: 4.4 MMOL/L — SIGNIFICANT CHANGE UP (ref 3.5–5.3)
PROCALCITONIN SERPL-MCNC: 0.91 NG/ML — HIGH (ref 0–0.05)
PROT SERPL-MCNC: 5.6 G/DL — LOW (ref 6–8.3)
PROT SERPL-MCNC: 6 G/DL — SIGNIFICANT CHANGE UP (ref 6–8.3)
RBC # BLD: 2.3 M/UL — LOW (ref 3.8–5.2)
RBC # BLD: 2.44 M/UL — LOW (ref 3.8–5.2)
RBC # FLD: 15.8 % — HIGH (ref 10.3–14.5)
RBC # FLD: 15.9 % — HIGH (ref 10.3–14.5)
SODIUM SERPL-SCNC: 141 MMOL/L — SIGNIFICANT CHANGE UP (ref 135–145)
SODIUM SERPL-SCNC: 142 MMOL/L — SIGNIFICANT CHANGE UP (ref 135–145)
SODIUM SERPL-SCNC: 148 MMOL/L — HIGH (ref 135–145)
VANCOMYCIN FLD-MCNC: 15.8 UG/ML — SIGNIFICANT CHANGE UP
VANCOMYCIN FLD-MCNC: 20.7 UG/ML — SIGNIFICANT CHANGE UP
VARIANT LYMPHS # BLD: 2 % — SIGNIFICANT CHANGE UP
WBC # BLD: 2.67 K/UL — LOW (ref 3.8–10.5)
WBC # BLD: 9.96 K/UL — SIGNIFICANT CHANGE UP (ref 3.8–10.5)
WBC # FLD AUTO: 2.67 K/UL — LOW (ref 3.8–10.5)
WBC # FLD AUTO: 9.96 K/UL — SIGNIFICANT CHANGE UP (ref 3.8–10.5)

## 2017-04-06 RX ORDER — POTASSIUM CHLORIDE 20 MEQ
40 PACKET (EA) ORAL ONCE
Qty: 0 | Refills: 0 | Status: COMPLETED | OUTPATIENT
Start: 2017-04-06 | End: 2017-04-06

## 2017-04-06 RX ORDER — SODIUM CHLORIDE 9 MG/ML
250 INJECTION INTRAMUSCULAR; INTRAVENOUS; SUBCUTANEOUS ONCE
Qty: 0 | Refills: 0 | Status: COMPLETED | OUTPATIENT
Start: 2017-04-06 | End: 2017-04-06

## 2017-04-06 RX ORDER — POTASSIUM CHLORIDE 20 MEQ
10 PACKET (EA) ORAL
Qty: 0 | Refills: 0 | Status: COMPLETED | OUTPATIENT
Start: 2017-04-06 | End: 2017-04-06

## 2017-04-06 RX ORDER — MIDODRINE HYDROCHLORIDE 2.5 MG/1
5 TABLET ORAL
Qty: 0 | Refills: 0 | Status: DISCONTINUED | OUTPATIENT
Start: 2017-04-06 | End: 2017-04-08

## 2017-04-06 RX ORDER — MIDODRINE HYDROCHLORIDE 2.5 MG/1
10 TABLET ORAL ONCE
Qty: 0 | Refills: 0 | Status: COMPLETED | OUTPATIENT
Start: 2017-04-06 | End: 2017-04-06

## 2017-04-06 RX ORDER — VANCOMYCIN HCL 1 G
1000 VIAL (EA) INTRAVENOUS ONCE
Qty: 0 | Refills: 0 | Status: COMPLETED | OUTPATIENT
Start: 2017-04-06 | End: 2017-04-06

## 2017-04-06 RX ADMIN — SIMVASTATIN 40 MILLIGRAM(S): 20 TABLET, FILM COATED ORAL at 22:02

## 2017-04-06 RX ADMIN — HEPARIN SODIUM 5000 UNIT(S): 5000 INJECTION INTRAVENOUS; SUBCUTANEOUS at 05:15

## 2017-04-06 RX ADMIN — BRIMONIDINE TARTRATE 1 DROP(S): 2 SOLUTION/ DROPS OPHTHALMIC at 05:15

## 2017-04-06 RX ADMIN — HEPARIN SODIUM 5000 UNIT(S): 5000 INJECTION INTRAVENOUS; SUBCUTANEOUS at 17:07

## 2017-04-06 RX ADMIN — DORZOLAMIDE HYDROCHLORIDE TIMOLOL MALEATE 1 DROP(S): 20; 5 SOLUTION/ DROPS OPHTHALMIC at 05:15

## 2017-04-06 RX ADMIN — Medication 100 MILLIEQUIVALENT(S): at 17:22

## 2017-04-06 RX ADMIN — PANTOPRAZOLE SODIUM 40 MILLIGRAM(S): 20 TABLET, DELAYED RELEASE ORAL at 06:40

## 2017-04-06 RX ADMIN — Medication 250 MILLIGRAM(S): at 22:02

## 2017-04-06 RX ADMIN — BRIMONIDINE TARTRATE 1 DROP(S): 2 SOLUTION/ DROPS OPHTHALMIC at 13:26

## 2017-04-06 RX ADMIN — MIDODRINE HYDROCHLORIDE 10 MILLIGRAM(S): 2.5 TABLET ORAL at 23:42

## 2017-04-06 RX ADMIN — Medication 100 MILLIEQUIVALENT(S): at 19:00

## 2017-04-06 RX ADMIN — Medication 1 TABLET(S): at 12:27

## 2017-04-06 RX ADMIN — Medication 100 MILLIEQUIVALENT(S): at 15:53

## 2017-04-06 RX ADMIN — DORZOLAMIDE HYDROCHLORIDE TIMOLOL MALEATE 1 DROP(S): 20; 5 SOLUTION/ DROPS OPHTHALMIC at 17:07

## 2017-04-06 RX ADMIN — Medication 50 MILLIGRAM(S): at 18:22

## 2017-04-06 RX ADMIN — BRIMONIDINE TARTRATE 1 DROP(S): 2 SOLUTION/ DROPS OPHTHALMIC at 22:02

## 2017-04-06 RX ADMIN — Medication 50 MILLIGRAM(S): at 06:40

## 2017-04-06 RX ADMIN — LATANOPROST 1 DROP(S): 0.05 SOLUTION/ DROPS OPHTHALMIC; TOPICAL at 22:03

## 2017-04-06 RX ADMIN — ERYTHROPOIETIN 10000 UNIT(S): 10000 INJECTION, SOLUTION INTRAVENOUS; SUBCUTANEOUS at 12:06

## 2017-04-06 RX ADMIN — SODIUM CHLORIDE 500 MILLILITER(S): 9 INJECTION INTRAMUSCULAR; INTRAVENOUS; SUBCUTANEOUS at 15:55

## 2017-04-06 RX ADMIN — Medication 40 MILLIEQUIVALENT(S): at 15:53

## 2017-04-06 RX ADMIN — MIDODRINE HYDROCHLORIDE 15 MILLIGRAM(S): 2.5 TABLET ORAL at 07:48

## 2017-04-06 NOTE — PROVIDER CONTACT NOTE (OTHER) - ACTION/TREATMENT ORDERED:
Continue to monitor.
Administer midodrine as per EMAR.
Administer midodrine as ordered.
EKG
EKG ordered; provider reviewed results.

## 2017-04-06 NOTE — PROVIDER CONTACT NOTE (OTHER) - BACKGROUND
Pt Dx pneumonia, GERD
Pt dx pneumonia
Hemodialysis patient admitted to medicine for HCAP. Lowest BP yesterday 04/04/17 16:59 62/38. Latest BP 04/05/17 14:14 92/50.
Patient has hx of ESRD, has hemodialysis Thursday morning, admit dx of pneumonia.
Patient has hx of ESRD, has hemodialysis later this morning.

## 2017-04-06 NOTE — PROVIDER CONTACT NOTE (OTHER) - ASSESSMENT
asymptomatic. alert and oriented x 4
BP 91/48. Patient asymptomatic at this time.
pt reports feeling dizziness
BP 95/53. Patient asymptomatic at this time.
BP 95/53. Patient asymptomatic at this time.
Pt verbalized pain
Pt has productive frequent cough

## 2017-04-07 ENCOUNTER — TRANSCRIPTION ENCOUNTER (OUTPATIENT)
Age: 82
End: 2017-04-07

## 2017-04-07 LAB
ALBUMIN SERPL ELPH-MCNC: 3.3 G/DL — SIGNIFICANT CHANGE UP (ref 3.3–5)
ALP SERPL-CCNC: 110 U/L — SIGNIFICANT CHANGE UP (ref 40–120)
ALT FLD-CCNC: 30 U/L — SIGNIFICANT CHANGE UP (ref 4–33)
AST SERPL-CCNC: 51 U/L — HIGH (ref 4–32)
BACTERIA BLD CULT: SIGNIFICANT CHANGE UP
BACTERIA BLD CULT: SIGNIFICANT CHANGE UP
BILIRUB SERPL-MCNC: 0.3 MG/DL — SIGNIFICANT CHANGE UP (ref 0.2–1.2)
BLD GP AB SCN SERPL QL: NEGATIVE — SIGNIFICANT CHANGE UP
BUN SERPL-MCNC: 38 MG/DL — HIGH (ref 7–23)
CALCIUM SERPL-MCNC: 9.4 MG/DL — SIGNIFICANT CHANGE UP (ref 8.4–10.5)
CHLORIDE SERPL-SCNC: 99 MMOL/L — SIGNIFICANT CHANGE UP (ref 98–107)
CO2 SERPL-SCNC: 22 MMOL/L — SIGNIFICANT CHANGE UP (ref 22–31)
CREAT SERPL-MCNC: 3.22 MG/DL — HIGH (ref 0.5–1.3)
GLUCOSE SERPL-MCNC: 69 MG/DL — LOW (ref 70–99)
HCT VFR BLD CALC: 29.1 % — LOW (ref 34.5–45)
HGB BLD-MCNC: 8.8 G/DL — LOW (ref 11.5–15.5)
MAGNESIUM SERPL-MCNC: 2.4 MG/DL — SIGNIFICANT CHANGE UP (ref 1.6–2.6)
MCHC RBC-ENTMCNC: 30.2 % — LOW (ref 32–36)
MCHC RBC-ENTMCNC: 30.7 PG — SIGNIFICANT CHANGE UP (ref 27–34)
MCV RBC AUTO: 101.4 FL — HIGH (ref 80–100)
PHOSPHATE SERPL-MCNC: 2.3 MG/DL — LOW (ref 2.5–4.5)
PLATELET # BLD AUTO: 283 K/UL — SIGNIFICANT CHANGE UP (ref 150–400)
PMV BLD: 11.6 FL — SIGNIFICANT CHANGE UP (ref 7–13)
POTASSIUM SERPL-MCNC: 4.6 MMOL/L — SIGNIFICANT CHANGE UP (ref 3.5–5.3)
POTASSIUM SERPL-SCNC: 4.6 MMOL/L — SIGNIFICANT CHANGE UP (ref 3.5–5.3)
PROT SERPL-MCNC: 6.4 G/DL — SIGNIFICANT CHANGE UP (ref 6–8.3)
RBC # BLD: 2.87 M/UL — LOW (ref 3.8–5.2)
RBC # FLD: 16.6 % — HIGH (ref 10.3–14.5)
RH IG SCN BLD-IMP: POSITIVE — SIGNIFICANT CHANGE UP
SODIUM SERPL-SCNC: 141 MMOL/L — SIGNIFICANT CHANGE UP (ref 135–145)
WBC # BLD: 14.03 K/UL — HIGH (ref 3.8–10.5)
WBC # FLD AUTO: 14.03 K/UL — HIGH (ref 3.8–10.5)

## 2017-04-07 RX ORDER — ONDANSETRON 8 MG/1
4 TABLET, FILM COATED ORAL ONCE
Qty: 0 | Refills: 0 | Status: COMPLETED | OUTPATIENT
Start: 2017-04-07 | End: 2017-04-07

## 2017-04-07 RX ORDER — NYSTATIN CREAM 100000 [USP'U]/G
1 CREAM TOPICAL
Qty: 0 | Refills: 0 | Status: DISCONTINUED | OUTPATIENT
Start: 2017-04-07 | End: 2017-04-08

## 2017-04-07 RX ADMIN — DORZOLAMIDE HYDROCHLORIDE TIMOLOL MALEATE 1 DROP(S): 20; 5 SOLUTION/ DROPS OPHTHALMIC at 06:13

## 2017-04-07 RX ADMIN — HEPARIN SODIUM 5000 UNIT(S): 5000 INJECTION INTRAVENOUS; SUBCUTANEOUS at 06:14

## 2017-04-07 RX ADMIN — BRIMONIDINE TARTRATE 1 DROP(S): 2 SOLUTION/ DROPS OPHTHALMIC at 21:13

## 2017-04-07 RX ADMIN — DORZOLAMIDE HYDROCHLORIDE TIMOLOL MALEATE 1 DROP(S): 20; 5 SOLUTION/ DROPS OPHTHALMIC at 17:43

## 2017-04-07 RX ADMIN — Medication 50 MILLIGRAM(S): at 06:13

## 2017-04-07 RX ADMIN — LATANOPROST 1 DROP(S): 0.05 SOLUTION/ DROPS OPHTHALMIC; TOPICAL at 21:12

## 2017-04-07 RX ADMIN — BRIMONIDINE TARTRATE 1 DROP(S): 2 SOLUTION/ DROPS OPHTHALMIC at 06:13

## 2017-04-07 RX ADMIN — MIDODRINE HYDROCHLORIDE 5 MILLIGRAM(S): 2.5 TABLET ORAL at 17:42

## 2017-04-07 RX ADMIN — HEPARIN SODIUM 5000 UNIT(S): 5000 INJECTION INTRAVENOUS; SUBCUTANEOUS at 17:42

## 2017-04-07 RX ADMIN — BRIMONIDINE TARTRATE 1 DROP(S): 2 SOLUTION/ DROPS OPHTHALMIC at 13:19

## 2017-04-07 RX ADMIN — Medication 1 TABLET(S): at 11:50

## 2017-04-07 RX ADMIN — ONDANSETRON 4 MILLIGRAM(S): 8 TABLET, FILM COATED ORAL at 06:13

## 2017-04-07 RX ADMIN — SIMVASTATIN 40 MILLIGRAM(S): 20 TABLET, FILM COATED ORAL at 21:13

## 2017-04-07 RX ADMIN — PANTOPRAZOLE SODIUM 40 MILLIGRAM(S): 20 TABLET, DELAYED RELEASE ORAL at 06:13

## 2017-04-07 RX ADMIN — NYSTATIN CREAM 1 APPLICATION(S): 100000 CREAM TOPICAL at 21:12

## 2017-04-07 NOTE — DISCHARGE NOTE ADULT - CARE PLAN
Principal Discharge DX:	HCAP (healthcare-associated pneumonia)  Goal:	Resolution  Instructions for follow-up, activity and diet:	You have completed your entire course on antibiotics.   Follow up with your primary care provider within 1 week of discharge. Call to schedule your appointment  A repeat chest x ray in recommended for 4 to 6 weeks to ensure resolution of pneumonia  Secondary Diagnosis:	Hypotension of hemodialysis  Instructions for follow-up, activity and diet:	Continue Midodrine as directed  Secondary Diagnosis:	Chronic renal failure  Instructions for follow-up, activity and diet:	Continue hemodialysis per routine  Secondary Diagnosis:	Hyperlipidemia, unspecified hyperlipidemia type  Instructions for follow-up, activity and diet:	Continue Zocor as directed

## 2017-04-07 NOTE — DISCHARGE NOTE ADULT - PATIENT PORTAL LINK FT
“You can access the Follow[employer] Patient Portal, offered by [facility], by registering with the following website: [email or web address]”

## 2017-04-07 NOTE — PROVIDER CONTACT NOTE (OTHER) - DATE AND TIME:
03-Apr-2017 08:55
03-Apr-2017 22:30
04-Apr-2017
04-Apr-2017 15:15
05-Apr-2017 21:00
07-Apr-2017 17:54
04-Apr-2017 22:40
05-Apr-2017 21:00
06-Apr-2017 11:20
03-Apr-2017 18:40
06-Apr-2017 05:10
07-Apr-2017 15:30

## 2017-04-07 NOTE — DISCHARGE NOTE ADULT - HOME CARE AGENCY
Patient to d/c home 04/08, Saturday, pt states she is known to [facility]  for RN for start of care 04/09/2017

## 2017-04-07 NOTE — PROVIDER CONTACT NOTE (OTHER) - REASON
BP 95/53
Hypotensive
Pt reported chest pain
low K level
Low Potassium
Pt reported chest pain
Residual
BP 95/53
Hypotensive
BP 91/48

## 2017-04-07 NOTE — PROVIDER CONTACT NOTE (OTHER) - NAME OF MD/NP/PA/DO NOTIFIED:
ADS NP Laureen [id number]
Elvira, ADS
Elvira, ADS
JIM Lopez NP
Team ADS
Keke, ADS NP
Keke, ADS NP
Team ADS
Elvira, ADS
Keke, ADS NP
Keke, ADS NP

## 2017-04-07 NOTE — DISCHARGE NOTE ADULT - PROVIDER TOKENS
TOKEN:'[id number]',FREE:[LAST:[Dr Roxy Dash - primary care provider],PHONE:[(   )    -],FAX:[(   )    -]]

## 2017-04-07 NOTE — DISCHARGE NOTE ADULT - CARE PROVIDER_API CALL
Hugo Forman), Internal Medicine  [facility] [place] [address]  Phone: [phone]  Fax: [phone]    Dr Roxy Dash - primary care provider,   Phone: (   )    -  Fax: (   )    -

## 2017-04-07 NOTE — PROVIDER CONTACT NOTE (OTHER) - RECOMMENDATIONS
Continue to monitor.
hold feed and wait for further orders
recheck at 1830
Administer another 250cc bolus
NP will put in orders
manually BP
Administer midodrine as per EMAR.
no recommendations
Administer midodrine as ordered.
EKG

## 2017-04-07 NOTE — DISCHARGE NOTE ADULT - MEDICATION SUMMARY - MEDICATIONS TO TAKE
I will START or STAY ON the medications listed below when I get home from the hospital:    aspirin 81 mg oral tablet, chewable  -- 1 tab(s) by mouth once a day, As Needed  -- Indication: For CVA (cerebral vascular accident)    simvastatin 40 mg oral tablet  -- 1 tab(s) by gastrostomy tube once a day (at bedtime)  -- Indication: For HLD    nystatin 100,000 units/g topical powder  -- 1 application on skin 2 times a day  -- Indication: For Dressing change    Robitussin 100 mg/5 mL oral liquid  -- 5 milliliter(s) by mouth every 8 hours, As Needed  -- Indication: For Cough     Pepcid AC 10 mg oral tablet  -- 1 tab(s) by mouth 2 times a day, As Needed  -- Indication: For Gerd    midodrine 5 mg oral tablet  -- 3 tab(s) by mouth once a day on tuesday, thursday and saturdays (pre dialysis)  -- Indication: For Hypotension     midodrine 5 mg oral tablet  -- 1 tab(s) by mouth Monday, Wednesday, Friday and sunday ( non dialysis days  -- Indication: For Hypotension     brimonidine 0.2% ophthalmic solution  -- 1 drop(s) to each affected eye 3 times a day  -- Indication: For Eye drops    Cosopt 2.23%-0.68% ophthalmic solution  -- 1 drop(s) to each affected eye 2 times a day  -- Indication: For Eye drops    Travatan Z 0.004% ophthalmic solution  -- 1 drop(s) to each affected eye once a day (in the evening)  -- Indication: For Eye drops    pantoprazole 40 mg oral granule, enteric coated  -- 1 tab(s) by mouth once a day  -- Indication: For GERD    Darcy-Melody oral tablet  -- 1 tab(s) by gastrostomy tube once a day  -- Indication: For Supplement

## 2017-04-07 NOTE — DISCHARGE NOTE ADULT - MEDICATION SUMMARY - MEDICATIONS TO CHANGE
I will SWITCH the dose or number of times a day I take the medications listed below when I get home from the hospital:    midodrine 10 mg oral tablet  -- 1.5 tab(s) by gastrostomy tube, 30 minutes before dialysis 3 times per week (Tuesday, Thursday, Saturday dialysis)

## 2017-04-07 NOTE — DISCHARGE NOTE ADULT - PLAN OF CARE
Resolution You have completed your entire course on antibiotics.   Follow up with your primary care provider within 1 week of discharge. Call to schedule your appointment  A repeat chest x ray in recommended for 4 to 6 weeks to ensure resolution of pneumonia Continue Midodrine as directed Continue hemodialysis per routine Continue Zocor as directed

## 2017-04-07 NOTE — DISCHARGE NOTE ADULT - HOSPITAL COURSE
83 y/o F h/o HTN, HLD, CVA, GERD, ESRD (T/Th/Sat), p/w worsening cough x 2 weeks. VS stable. PE w/ RUL crackles. Labs with leukocytosis and group.CT w RUL opacity. Admitted to medicine for HCAP    Hospital Course       HCAP (healthcare-associated pneumonia).   - ID Dr Land consulted   - w/ sepsis (HR, WBC)  - cont- vanc/aztreonam  - blood cx -- neg, RVP- neg   - CT Chest- Right upper lobe groundglass opacities which may be inflammatory/infectious in nature. Short-term follow-up to resolution in 4-6 weeks at the appropriate clinical treatment is recommended.  - Abx course complete 4/9         Hypotension   - Midodrine increased 4/6  - Renal hinojosa      Chronic renal failure, unspecified stage.    - renally dose meds  - HD T/Th/Sat  - Renal consulted   - Midodrine half hour before HD.   - NS bolus as needed, no need to repleat K+    Osteoporosis.    - continue home meds.     Gastroesophageal reflux disease,    - s/p PEG with feeding   - cont PPI.     Need for prophylactic measure.    -HSQ  - tube feeds.     Glaucoma.   - cont home eye gtt.    Hyperlipidemia, unspecified hyperlipidemia type.    -cont home meds.     CVA (cerebral vascular accident).    -h/o CVA- not on daily ASA?       Abx course complete. Pt medically stable for discharge home w/ home care service. PCP and renal f/u. HD to continue per routine. Repeat Chest x ray in 4-6wk to assess resolution

## 2017-04-07 NOTE — PROVIDER CONTACT NOTE (OTHER) - SITUATION
Manual BP 80/40
BP 85/45 HR 66
BP 78/38 manually. Midodrine 5mg administered
BP 95/53.
K 2.9
Patient reported chest pain
Pt currently in dialysis where labs were drawn and K level of 2.6 reported from [facility].  Provider made aware and repeat labs were ordered and drawn
Pt reported chest pain
Post residual feed noted @ 100cc
BP 62/38 after bolus of 250cc
BP 91/48.
BP 95/53.

## 2017-04-08 VITALS
HEART RATE: 64 BPM | TEMPERATURE: 97 F | DIASTOLIC BLOOD PRESSURE: 63 MMHG | SYSTOLIC BLOOD PRESSURE: 135 MMHG | RESPIRATION RATE: 16 BRPM

## 2017-04-08 LAB
BUN SERPL-MCNC: 61 MG/DL — HIGH (ref 7–23)
CALCIUM SERPL-MCNC: 9.6 MG/DL — SIGNIFICANT CHANGE UP (ref 8.4–10.5)
CHLORIDE SERPL-SCNC: 102 MMOL/L — SIGNIFICANT CHANGE UP (ref 98–107)
CO2 SERPL-SCNC: 24 MMOL/L — SIGNIFICANT CHANGE UP (ref 22–31)
CREAT SERPL-MCNC: 4.81 MG/DL — HIGH (ref 0.5–1.3)
GLUCOSE SERPL-MCNC: 71 MG/DL — SIGNIFICANT CHANGE UP (ref 70–99)
HCT VFR BLD CALC: 27.6 % — LOW (ref 34.5–45)
HGB BLD-MCNC: 8.5 G/DL — LOW (ref 11.5–15.5)
MAGNESIUM SERPL-MCNC: 2.8 MG/DL — HIGH (ref 1.6–2.6)
MCHC RBC-ENTMCNC: 30.8 % — LOW (ref 32–36)
MCHC RBC-ENTMCNC: 31.1 PG — SIGNIFICANT CHANGE UP (ref 27–34)
MCV RBC AUTO: 101.1 FL — HIGH (ref 80–100)
PHOSPHATE SERPL-MCNC: 3.4 MG/DL — SIGNIFICANT CHANGE UP (ref 2.5–4.5)
PLATELET # BLD AUTO: 284 K/UL — SIGNIFICANT CHANGE UP (ref 150–400)
PMV BLD: 11.7 FL — SIGNIFICANT CHANGE UP (ref 7–13)
POTASSIUM SERPL-MCNC: 3.9 MMOL/L — SIGNIFICANT CHANGE UP (ref 3.5–5.3)
POTASSIUM SERPL-SCNC: 3.9 MMOL/L — SIGNIFICANT CHANGE UP (ref 3.5–5.3)
RBC # BLD: 2.73 M/UL — LOW (ref 3.8–5.2)
RBC # FLD: 16.8 % — HIGH (ref 10.3–14.5)
SODIUM SERPL-SCNC: 145 MMOL/L — SIGNIFICANT CHANGE UP (ref 135–145)
WBC # BLD: 9.67 K/UL — SIGNIFICANT CHANGE UP (ref 3.8–10.5)
WBC # FLD AUTO: 9.67 K/UL — SIGNIFICANT CHANGE UP (ref 3.8–10.5)

## 2017-04-08 RX ORDER — MIDODRINE HYDROCHLORIDE 2.5 MG/1
3 TABLET ORAL
Qty: 90 | Refills: 0
Start: 2017-04-08 | End: 2017-05-08

## 2017-04-08 RX ORDER — MIDODRINE HYDROCHLORIDE 2.5 MG/1
1 TABLET ORAL
Qty: 13 | Refills: 0
Start: 2017-04-08 | End: 2017-05-08

## 2017-04-08 RX ORDER — NYSTATIN CREAM 100000 [USP'U]/G
1 CREAM TOPICAL
Qty: 1 | Refills: 0
Start: 2017-04-08

## 2017-04-08 RX ADMIN — DORZOLAMIDE HYDROCHLORIDE TIMOLOL MALEATE 1 DROP(S): 20; 5 SOLUTION/ DROPS OPHTHALMIC at 06:06

## 2017-04-08 RX ADMIN — ERYTHROPOIETIN 10000 UNIT(S): 10000 INJECTION, SOLUTION INTRAVENOUS; SUBCUTANEOUS at 11:46

## 2017-04-08 RX ADMIN — NYSTATIN CREAM 1 APPLICATION(S): 100000 CREAM TOPICAL at 06:07

## 2017-04-08 RX ADMIN — PANTOPRAZOLE SODIUM 40 MILLIGRAM(S): 20 TABLET, DELAYED RELEASE ORAL at 06:07

## 2017-04-08 RX ADMIN — BRIMONIDINE TARTRATE 1 DROP(S): 2 SOLUTION/ DROPS OPHTHALMIC at 06:07

## 2017-04-08 RX ADMIN — HEPARIN SODIUM 5000 UNIT(S): 5000 INJECTION INTRAVENOUS; SUBCUTANEOUS at 06:07

## 2017-04-08 RX ADMIN — MIDODRINE HYDROCHLORIDE 15 MILLIGRAM(S): 2.5 TABLET ORAL at 09:26

## 2017-04-28 ENCOUNTER — APPOINTMENT (OUTPATIENT)
Dept: INTERNAL MEDICINE | Facility: CLINIC | Age: 82
End: 2017-04-28

## 2017-04-28 ENCOUNTER — APPOINTMENT (OUTPATIENT)
Dept: CARDIOLOGY | Facility: CLINIC | Age: 82
End: 2017-04-28

## 2017-04-28 VITALS
SYSTOLIC BLOOD PRESSURE: 74 MMHG | DIASTOLIC BLOOD PRESSURE: 46 MMHG | HEART RATE: 60 BPM | OXYGEN SATURATION: 95 % | RESPIRATION RATE: 18 BRPM

## 2017-04-28 DIAGNOSIS — I95.1 ORTHOSTATIC HYPOTENSION: ICD-10-CM

## 2017-04-28 DIAGNOSIS — Q24.8 OTHER SPECIFIED CONGENITAL MALFORMATIONS OF HEART: ICD-10-CM

## 2017-04-28 DIAGNOSIS — I35.0 NONRHEUMATIC AORTIC (VALVE) STENOSIS: ICD-10-CM

## 2017-04-28 DIAGNOSIS — Z01.810 ENCOUNTER FOR PREPROCEDURAL CARDIOVASCULAR EXAMINATION: ICD-10-CM

## 2017-04-28 DIAGNOSIS — Z87.09 PERSONAL HISTORY OF OTHER DISEASES OF THE RESPIRATORY SYSTEM: ICD-10-CM

## 2017-04-28 DIAGNOSIS — R94.31 ABNORMAL ELECTROCARDIOGRAM [ECG] [EKG]: ICD-10-CM

## 2017-04-28 DIAGNOSIS — K21.9 GASTRO-ESOPHAGEAL REFLUX DISEASE W/OUT ESOPHAGITIS: ICD-10-CM

## 2017-04-28 DIAGNOSIS — I34.8 OTHER NONRHEUMATIC MITRAL VALVE DISORDERS: ICD-10-CM

## 2017-04-28 DIAGNOSIS — I35.9 NONRHEUMATIC AORTIC VALVE DISORDER, UNSPECIFIED: ICD-10-CM

## 2017-04-28 RX ORDER — MOXIFLOXACIN HYDROCHLORIDE TABLETS, 400 MG 400 MG/1
400 TABLET, FILM COATED ORAL DAILY
Qty: 7 | Refills: 0 | Status: COMPLETED | COMMUNITY
Start: 2017-03-22 | End: 2017-04-28

## 2017-05-01 LAB
ALBUMIN SERPL ELPH-MCNC: 4 G/DL
ALP BLD-CCNC: 148 U/L
ALT SERPL-CCNC: 28 U/L
ANION GAP SERPL CALC-SCNC: 24 MMOL/L
AST SERPL-CCNC: 42 U/L
BASOPHILS # BLD AUTO: 0.11 K/UL
BASOPHILS NFR BLD AUTO: 1.2 %
BILIRUB SERPL-MCNC: 0.3 MG/DL
BUN SERPL-MCNC: 63 MG/DL
CALCIUM SERPL-MCNC: 10.5 MG/DL
CHLORIDE SERPL-SCNC: 93 MMOL/L
CO2 SERPL-SCNC: 22 MMOL/L
CREAT SERPL-MCNC: 4.41 MG/DL
EOSINOPHIL # BLD AUTO: 0.37 K/UL
EOSINOPHIL NFR BLD AUTO: 4.1 %
GLUCOSE SERPL-MCNC: 76 MG/DL
HCT VFR BLD CALC: 32.7 %
HGB BLD-MCNC: 10.1 G/DL
IMM GRANULOCYTES NFR BLD AUTO: 0 %
LYMPHOCYTES # BLD AUTO: 1.37 K/UL
LYMPHOCYTES NFR BLD AUTO: 15.2 %
MAN DIFF?: NORMAL
MCHC RBC-ENTMCNC: 30.1 PG
MCHC RBC-ENTMCNC: 30.9 GM/DL
MCV RBC AUTO: 97.6 FL
MONOCYTES # BLD AUTO: 0.69 K/UL
MONOCYTES NFR BLD AUTO: 7.6 %
NEUTROPHILS # BLD AUTO: 6.49 K/UL
NEUTROPHILS NFR BLD AUTO: 71.9 %
PLATELET # BLD AUTO: 231 K/UL
POTASSIUM SERPL-SCNC: 3.6 MMOL/L
PROT SERPL-MCNC: 7.7 G/DL
RBC # BLD: 3.35 M/UL
RBC # FLD: 15.4 %
SODIUM SERPL-SCNC: 139 MMOL/L
WBC # FLD AUTO: 9.03 K/UL

## 2017-05-05 ENCOUNTER — APPOINTMENT (OUTPATIENT)
Dept: CARDIOLOGY | Facility: CLINIC | Age: 82
End: 2017-05-05

## 2017-05-05 PROBLEM — I34.8 SYSTOLIC ANTERIOR MOVEMENT OF MITRAL VALVE: Status: ACTIVE | Noted: 2017-05-05

## 2017-05-05 PROBLEM — Q24.8 LEFT VENTRICULAR OUTFLOW TRACT OBSTRUCTION: Status: ACTIVE | Noted: 2017-05-05

## 2017-05-06 ENCOUNTER — RESULT REVIEW (OUTPATIENT)
Age: 82
End: 2017-05-06

## 2017-06-15 ENCOUNTER — APPOINTMENT (OUTPATIENT)
Dept: GASTROENTEROLOGY | Facility: CLINIC | Age: 82
End: 2017-06-15

## 2017-06-15 VITALS
DIASTOLIC BLOOD PRESSURE: 58 MMHG | HEART RATE: 47 BPM | BODY MASS INDEX: 21.28 KG/M2 | OXYGEN SATURATION: 97 % | SYSTOLIC BLOOD PRESSURE: 98 MMHG | WEIGHT: 100 LBS | HEIGHT: 57.5 IN | RESPIRATION RATE: 12 BRPM | TEMPERATURE: 97.5 F

## 2017-06-16 LAB
ALBUMIN SERPL ELPH-MCNC: 4.2 G/DL
ALP BLD-CCNC: 123 U/L
ALT SERPL-CCNC: 22 U/L
AMYLASE/CREAT SERPL: 280 U/L
ANION GAP SERPL CALC-SCNC: 26 MMOL/L
AST SERPL-CCNC: 30 U/L
BASOPHILS # BLD AUTO: 0.02 K/UL
BASOPHILS NFR BLD AUTO: 0.3 %
BILIRUB SERPL-MCNC: 0.4 MG/DL
BUN SERPL-MCNC: 74 MG/DL
CALCIUM SERPL-MCNC: 10.9 MG/DL
CHLORIDE SERPL-SCNC: 89 MMOL/L
CO2 SERPL-SCNC: 22 MMOL/L
CREAT SERPL-MCNC: 6.13 MG/DL
EOSINOPHIL # BLD AUTO: 0.33 K/UL
EOSINOPHIL NFR BLD AUTO: 5.1 %
GLUCOSE SERPL-MCNC: 92 MG/DL
HCT VFR BLD CALC: 46.5 %
HGB BLD-MCNC: 15 G/DL
IMM GRANULOCYTES NFR BLD AUTO: 0.2 %
LPL SERPL-CCNC: 147 U/L
LYMPHOCYTES # BLD AUTO: 1.32 K/UL
LYMPHOCYTES NFR BLD AUTO: 20.3 %
MAN DIFF?: NORMAL
MCHC RBC-ENTMCNC: 31.7 PG
MCHC RBC-ENTMCNC: 32.3 GM/DL
MCV RBC AUTO: 98.3 FL
MONOCYTES # BLD AUTO: 0.43 K/UL
MONOCYTES NFR BLD AUTO: 6.6 %
NEUTROPHILS # BLD AUTO: 4.4 K/UL
NEUTROPHILS NFR BLD AUTO: 67.5 %
PLATELET # BLD AUTO: 117 K/UL
POTASSIUM SERPL-SCNC: 4.2 MMOL/L
PROT SERPL-MCNC: 7.8 G/DL
RBC # BLD: 4.73 M/UL
RBC # FLD: 17.3 %
SODIUM SERPL-SCNC: 137 MMOL/L
WBC # FLD AUTO: 6.51 K/UL

## 2017-06-19 ENCOUNTER — OUTPATIENT (OUTPATIENT)
Dept: OUTPATIENT SERVICES | Facility: HOSPITAL | Age: 82
LOS: 1 days | End: 2017-06-19
Payer: MEDICARE

## 2017-06-19 VITALS
SYSTOLIC BLOOD PRESSURE: 101 MMHG | TEMPERATURE: 99 F | WEIGHT: 100.09 LBS | HEART RATE: 79 BPM | DIASTOLIC BLOOD PRESSURE: 65 MMHG

## 2017-06-19 DIAGNOSIS — Z93.1 GASTROSTOMY STATUS: Chronic | ICD-10-CM

## 2017-06-19 DIAGNOSIS — Z98.49 CATARACT EXTRACTION STATUS, UNSPECIFIED EYE: Chronic | ICD-10-CM

## 2017-06-19 DIAGNOSIS — N18.6 END STAGE RENAL DISEASE: ICD-10-CM

## 2017-06-19 DIAGNOSIS — Z95.828 PRESENCE OF OTHER VASCULAR IMPLANTS AND GRAFTS: Chronic | ICD-10-CM

## 2017-06-19 DIAGNOSIS — Z99.2 DEPENDENCE ON RENAL DIALYSIS: Chronic | ICD-10-CM

## 2017-06-19 DIAGNOSIS — Z01.818 ENCOUNTER FOR OTHER PREPROCEDURAL EXAMINATION: ICD-10-CM

## 2017-06-19 LAB
ANION GAP SERPL CALC-SCNC: 13 MMOL/L — SIGNIFICANT CHANGE UP (ref 5–17)
BUN SERPL-MCNC: 90 MG/DL — HIGH (ref 7–18)
CALCIUM SERPL-MCNC: 10.3 MG/DL — SIGNIFICANT CHANGE UP (ref 8.4–10.5)
CHLORIDE SERPL-SCNC: 96 MMOL/L — SIGNIFICANT CHANGE UP (ref 96–108)
CO2 SERPL-SCNC: 24 MMOL/L — SIGNIFICANT CHANGE UP (ref 22–31)
CREAT SERPL-MCNC: 7.63 MG/DL — HIGH (ref 0.5–1.3)
GLUCOSE SERPL-MCNC: 77 MG/DL — SIGNIFICANT CHANGE UP (ref 70–99)
HCT VFR BLD CALC: 53.5 % — HIGH (ref 34.5–45)
HGB BLD-MCNC: 16.1 G/DL — HIGH (ref 11.5–15.5)
MCHC RBC-ENTMCNC: 30.1 GM/DL — LOW (ref 32–36)
MCHC RBC-ENTMCNC: 31.3 PG — SIGNIFICANT CHANGE UP (ref 27–34)
MCV RBC AUTO: 103.7 FL — HIGH (ref 80–100)
PLATELET # BLD AUTO: 139 K/UL — LOW (ref 150–400)
POTASSIUM SERPL-MCNC: 3.4 MMOL/L — LOW (ref 3.5–5.3)
POTASSIUM SERPL-SCNC: 3.4 MMOL/L — LOW (ref 3.5–5.3)
RBC # BLD: 5.16 M/UL — SIGNIFICANT CHANGE UP (ref 3.8–5.2)
RBC # FLD: 17.5 % — HIGH (ref 10.3–14.5)
SODIUM SERPL-SCNC: 133 MMOL/L — LOW (ref 135–145)
WBC # BLD: 4.5 K/UL — SIGNIFICANT CHANGE UP (ref 3.8–10.5)
WBC # FLD AUTO: 4.5 K/UL — SIGNIFICANT CHANGE UP (ref 3.8–10.5)

## 2017-06-19 PROCEDURE — 80048 BASIC METABOLIC PNL TOTAL CA: CPT

## 2017-06-19 PROCEDURE — 85027 COMPLETE CBC AUTOMATED: CPT

## 2017-06-19 PROCEDURE — G0463: CPT

## 2017-06-19 NOTE — H&P PST ADULT - GASTROINTESTINAL COMMENTS
Nausea, diarrhea at times. Has GT for feeding GT in place for feeding GT in place for feeding.  Unable to swallow

## 2017-06-19 NOTE — H&P PST ADULT - NEGATIVE OPHTHALMOLOGIC SYMPTOMS
no discharge R/no diplopia/no blurred vision R/no lacrimation L/no photophobia/no blurred vision L/no lacrimation R/no discharge L

## 2017-06-19 NOTE — H&P PST ADULT - MUSCULOSKELETAL
details… detailed exam no joint warmth/ROM intact/normal strength/no joint erythema/no calf tenderness/no joint swelling

## 2017-06-19 NOTE — H&P PST ADULT - PSH
Arteriovenous graft for hemodialysis in place, primary  1991  non functioning  S/P cataract extraction  Bilateral  S/P gastrostomy  PEG tube  Status post insertion of dialysis catheter  2017

## 2017-06-19 NOTE — H&P PST ADULT - NEGATIVE ENMT SYMPTOMS
no nasal congestion/no ear pain/no hearing difficulty/no sinus symptoms/no nasal discharge/no tinnitus/no vertigo

## 2017-06-19 NOTE — H&P PST ADULT - NEGATIVE ALLERGY TYPES
no reactions to insect bites/no reactions to animals/no reactions to food/no outdoor environmental allergies/no indoor environmental allergies

## 2017-06-19 NOTE — H&P PST ADULT - PMH
Chronic renal failure  On hemodialysis T, Th, Sat  History of CVA (cerebrovascular accident)  Left sided weakness, dysphagia, PEG in place  Hypotension    Murmur    Osteoporosis

## 2017-06-21 RX ORDER — FAMOTIDINE 20 MG/1
20 TABLET, FILM COATED ORAL TWICE DAILY
Qty: 30 | Refills: 5 | Status: DISCONTINUED | COMMUNITY
Start: 2017-04-28 | End: 2017-06-21

## 2017-06-21 RX ORDER — NYSTATIN 100000 [USP'U]/G
100000 POWDER TOPICAL
Qty: 15 | Refills: 0 | Status: ACTIVE | COMMUNITY
Start: 2017-04-08

## 2017-06-22 ENCOUNTER — MESSAGE (OUTPATIENT)
Age: 82
End: 2017-06-22

## 2017-06-23 ENCOUNTER — APPOINTMENT (OUTPATIENT)
Dept: INTERNAL MEDICINE | Facility: CLINIC | Age: 82
End: 2017-06-23

## 2017-06-23 VITALS
SYSTOLIC BLOOD PRESSURE: 106 MMHG | OXYGEN SATURATION: 96 % | RESPIRATION RATE: 16 BRPM | TEMPERATURE: 98.6 F | WEIGHT: 98 LBS | HEART RATE: 87 BPM | BODY MASS INDEX: 20.84 KG/M2 | DIASTOLIC BLOOD PRESSURE: 72 MMHG

## 2017-06-23 DIAGNOSIS — Z86.2 PERSONAL HISTORY OF DISEASES OF THE BLOOD AND BLOOD-FORMING ORGANS AND CERTAIN DISORDERS INVOLVING THE IMMUNE MECHANISM: ICD-10-CM

## 2017-06-23 DIAGNOSIS — Z01.818 ENCOUNTER FOR OTHER PREPROCEDURAL EXAMINATION: ICD-10-CM

## 2017-06-23 RX ORDER — SIMVASTATIN 40 MG/1
40 TABLET, FILM COATED ORAL
Refills: 0 | Status: ACTIVE | COMMUNITY

## 2017-07-12 ENCOUNTER — APPOINTMENT (OUTPATIENT)
Dept: CT IMAGING | Facility: CLINIC | Age: 82
End: 2017-07-12

## 2017-07-20 ENCOUNTER — FORM ENCOUNTER (OUTPATIENT)
Age: 82
End: 2017-07-20

## 2017-07-21 ENCOUNTER — APPOINTMENT (OUTPATIENT)
Dept: CT IMAGING | Facility: IMAGING CENTER | Age: 82
End: 2017-07-21

## 2017-07-21 ENCOUNTER — OUTPATIENT (OUTPATIENT)
Dept: OUTPATIENT SERVICES | Facility: HOSPITAL | Age: 82
LOS: 1 days | End: 2017-07-21
Payer: MEDICARE

## 2017-07-21 DIAGNOSIS — Z95.828 PRESENCE OF OTHER VASCULAR IMPLANTS AND GRAFTS: Chronic | ICD-10-CM

## 2017-07-21 DIAGNOSIS — Z98.49 CATARACT EXTRACTION STATUS, UNSPECIFIED EYE: Chronic | ICD-10-CM

## 2017-07-21 DIAGNOSIS — Z99.2 DEPENDENCE ON RENAL DIALYSIS: Chronic | ICD-10-CM

## 2017-07-21 DIAGNOSIS — R74.8 ABNORMAL LEVELS OF OTHER SERUM ENZYMES: ICD-10-CM

## 2017-07-21 DIAGNOSIS — Z93.1 GASTROSTOMY STATUS: Chronic | ICD-10-CM

## 2017-07-21 PROCEDURE — 74176 CT ABD & PELVIS W/O CONTRAST: CPT

## 2017-07-23 ENCOUNTER — EMERGENCY (EMERGENCY)
Facility: HOSPITAL | Age: 82
LOS: 1 days | Discharge: HOME | End: 2017-07-23
Attending: EMERGENCY MEDICINE | Admitting: EMERGENCY MEDICINE
Payer: MEDICARE

## 2017-07-23 VITALS
HEART RATE: 62 BPM | SYSTOLIC BLOOD PRESSURE: 94 MMHG | OXYGEN SATURATION: 96 % | DIASTOLIC BLOOD PRESSURE: 57 MMHG | RESPIRATION RATE: 18 BRPM

## 2017-07-23 VITALS
TEMPERATURE: 98 F | SYSTOLIC BLOOD PRESSURE: 94 MMHG | DIASTOLIC BLOOD PRESSURE: 63 MMHG | HEART RATE: 82 BPM | RESPIRATION RATE: 16 BRPM | OXYGEN SATURATION: 99 %

## 2017-07-23 DIAGNOSIS — Z95.828 PRESENCE OF OTHER VASCULAR IMPLANTS AND GRAFTS: Chronic | ICD-10-CM

## 2017-07-23 DIAGNOSIS — Z93.1 GASTROSTOMY STATUS: Chronic | ICD-10-CM

## 2017-07-23 DIAGNOSIS — Z99.2 DEPENDENCE ON RENAL DIALYSIS: Chronic | ICD-10-CM

## 2017-07-23 DIAGNOSIS — Z98.49 CATARACT EXTRACTION STATUS, UNSPECIFIED EYE: Chronic | ICD-10-CM

## 2017-07-23 PROCEDURE — 74000: CPT | Mod: 26

## 2017-07-23 PROCEDURE — 99284 EMERGENCY DEPT VISIT MOD MDM: CPT | Mod: 25,GC

## 2017-07-23 NOTE — ED ADULT NURSE NOTE - OBJECTIVE STATEMENT
pt. came in c/o "my feeding tube is not working." pt. with hx of dysphagia from stroke and had gastric tube since. as per pt, "they were trying to feed me last night, and the water is just coming out from the tube." pt's feeding tube still intact, pt. feels sore on her abdomen. pt. states feeding tube was changed 1 1/2 years ago and had felt soreness on the abdomen from the scars before. pt. denies any n/v. noted BP low, baseline SBP 90s. pt. also with hx of Dialysis, T-TH-Sat, last session was Saturday. dialysis access noted on LUCW. old fistula noted on left arm, another fistula on rt arm. as per pt, rt arm can't be used due to fistula. MD currently at bedside to eval pt. no labs to be drawn for now as per MD. will continue to monitor

## 2017-07-23 NOTE — ED ADULT NURSE NOTE - FALL HARM RISK
age(85 years old or older)/hx of cva uses walker other/hx of cva uses walker hx of cva uses walker/age(85 years old or older)/other

## 2017-07-23 NOTE — ED PROVIDER NOTE - ATTENDING CONTRIBUTION TO CARE
82F with PEG tube presents with PEG tube coming out, unable to flush. No other complaints. PEG tube last replaced 1 1/2 yrs ago. Attempted to deflate balloon and advance, unable to deflate, balloon appears to be ruptured. Will replace PEG tube, confirm placement by Xray, if in place d/c with follow-up. - Eduardo Li MD

## 2017-07-23 NOTE — ED PROVIDER NOTE - OBJECTIVE STATEMENT
82F complex pmh including ESRD TTS dialysis, CHF, with PEG tube placed due to dysphagia s/p CVA, PEG tube last replaced 1 1/2 yrs ago, presents with "PEG tube coming out." pt noted G tube to be out further than normal this evening. attempted to give feed through tube, unable to do so. chronic abdominal soreness due to scarring, no worse recently. no f/c. no n/v/d. - Eduardo Li MD

## 2017-07-23 NOTE — ED PROVIDER NOTE - PLAN OF CARE
1. Continue routine feeds as directed  2. Follow-up with your gastroenterologist, Dr. GENE Dash, in 3-5 days  3. Return immediately for any worsening or concerning symptoms

## 2017-07-23 NOTE — ED PROVIDER NOTE - CARE PLAN
Principal Discharge DX:	PEG tube malfunction  Instructions for follow-up, activity and diet:	1. Continue routine feeds as directed  2. Follow-up with your gastroenterologist, Dr. GENE Dash, in 3-5 days  3. Return immediately for any worsening or concerning symptoms

## 2017-07-23 NOTE — ED PROVIDER NOTE - GASTROINTESTINAL, MLM
Abdomen soft, non-tender, no guarding. G tube in place. No abdominal ttp. Stoma CDI, no discharge or bleeding

## 2017-07-23 NOTE — ED PROVIDER NOTE - FAMILY HISTORY
History of hypertension     Family history of heart disease     Grandparent  Still living? No  Family history of breast cancer, Age at diagnosis: Age Unknown

## 2017-07-23 NOTE — ED ADULT TRIAGE NOTE - CHIEF COMPLAINT QUOTE
Pt arrives from home via [employer]..as per EMT" She reports noticing her feeding tube is loose...I tried to push it back in."  Pt calm pleasant affect. Also report "My stomach is alittle sore."

## 2017-07-23 NOTE — ED ADULT NURSE REASSESSMENT NOTE - NS ED NURSE REASSESS COMMENT FT1
de in NAD at this time, new G-tube inserted by MD. kc went for x-ray, awaits results and dispo. will continue to monitor

## 2017-07-23 NOTE — ED PROVIDER NOTE - NS ED ROS FT
Constitutional: No fever or chills  Eyes: No visual changes  HEENT: No throat pain  CV: No chest pain  Resp: No SOB no cough  GI: G tube displaced  : No dysuria  MSK: No musculoskeletal pain  Skin: No rash  Neuro: No headache

## 2017-07-23 NOTE — ED PROVIDER NOTE - PROGRESS NOTE DETAILS
x-ray shows gastrograffin within stomach. able to flush tube. pt stable for d/c home. - Eduardo Li MD

## 2017-07-25 ENCOUNTER — APPOINTMENT (OUTPATIENT)
Dept: GASTROENTEROLOGY | Facility: CLINIC | Age: 82
End: 2017-07-25

## 2017-07-25 VITALS
HEART RATE: 62 BPM | SYSTOLIC BLOOD PRESSURE: 121 MMHG | DIASTOLIC BLOOD PRESSURE: 77 MMHG | RESPIRATION RATE: 15 BRPM | WEIGHT: 98 LBS | TEMPERATURE: 97.1 F | HEIGHT: 60 IN | BODY MASS INDEX: 19.24 KG/M2

## 2017-07-25 DIAGNOSIS — Z93.1 GASTROSTOMY STATUS: ICD-10-CM

## 2017-07-25 DIAGNOSIS — R74.8 ABNORMAL LEVELS OF OTHER SERUM ENZYMES: ICD-10-CM

## 2017-08-04 PROBLEM — R74.8 ELEVATED AMYLASE AND LIPASE: Status: ACTIVE | Noted: 2017-06-15

## 2017-08-04 PROBLEM — Z93.1 STATUS POST INSERTION OF PERCUTANEOUS ENDOSCOPIC GASTROSTOMY (PEG) TUBE: Status: ACTIVE | Noted: 2017-03-22

## 2017-08-09 ENCOUNTER — APPOINTMENT (OUTPATIENT)
Dept: INTERNAL MEDICINE | Facility: CLINIC | Age: 82
End: 2017-08-09
Payer: MEDICARE

## 2017-08-09 VITALS
DIASTOLIC BLOOD PRESSURE: 79 MMHG | RESPIRATION RATE: 15 BRPM | TEMPERATURE: 98 F | HEART RATE: 82 BPM | OXYGEN SATURATION: 98 % | SYSTOLIC BLOOD PRESSURE: 113 MMHG | WEIGHT: 104 LBS | BODY MASS INDEX: 20.31 KG/M2

## 2017-08-09 DIAGNOSIS — R05 COUGH: ICD-10-CM

## 2017-08-09 DIAGNOSIS — N18.6 END STAGE RENAL DISEASE: ICD-10-CM

## 2017-08-09 DIAGNOSIS — R30.0 DYSURIA: ICD-10-CM

## 2017-08-09 PROCEDURE — 99214 OFFICE O/P EST MOD 30 MIN: CPT

## 2017-08-09 RX ORDER — MIDODRINE HYDROCHLORIDE 5 MG/1
5 TABLET ORAL
Refills: 0 | Status: COMPLETED | COMMUNITY
Start: 2016-12-26 | End: 2017-08-09

## 2017-08-09 RX ORDER — MIDODRINE HYDROCHLORIDE 10 MG/1
10 TABLET ORAL
Qty: 48 | Refills: 0 | Status: COMPLETED | COMMUNITY
Start: 2017-01-31 | End: 2017-08-09

## 2017-08-09 RX ORDER — SULFAMETHOXAZOLE AND TRIMETHOPRIM 800; 160 MG/1; MG/1
800-160 TABLET ORAL TWICE DAILY
Qty: 6 | Refills: 0 | Status: ACTIVE | COMMUNITY
Start: 2017-08-09 | End: 1900-01-01

## 2017-08-09 RX ORDER — SEVELAMER CARBONATE 800 MG/1
800 TABLET, FILM COATED ORAL
Qty: 180 | Refills: 0 | Status: ACTIVE | COMMUNITY
Start: 2017-06-22

## 2017-08-21 ENCOUNTER — APPOINTMENT (OUTPATIENT)
Dept: GASTROENTEROLOGY | Facility: CLINIC | Age: 82
End: 2017-08-21

## 2017-09-29 ENCOUNTER — EMERGENCY (EMERGENCY)
Facility: HOSPITAL | Age: 82
LOS: 1 days | Discharge: HOME | End: 2017-09-29
Attending: EMERGENCY MEDICINE | Admitting: EMERGENCY MEDICINE
Payer: MEDICARE

## 2017-09-29 VITALS
RESPIRATION RATE: 17 BRPM | SYSTOLIC BLOOD PRESSURE: 98 MMHG | HEART RATE: 77 BPM | TEMPERATURE: 98 F | OXYGEN SATURATION: 97 % | DIASTOLIC BLOOD PRESSURE: 56 MMHG

## 2017-09-29 VITALS
SYSTOLIC BLOOD PRESSURE: 120 MMHG | TEMPERATURE: 98 F | RESPIRATION RATE: 16 BRPM | HEART RATE: 60 BPM | OXYGEN SATURATION: 98 % | DIASTOLIC BLOOD PRESSURE: 79 MMHG

## 2017-09-29 DIAGNOSIS — Z98.49 CATARACT EXTRACTION STATUS, UNSPECIFIED EYE: Chronic | ICD-10-CM

## 2017-09-29 DIAGNOSIS — Z93.1 GASTROSTOMY STATUS: Chronic | ICD-10-CM

## 2017-09-29 DIAGNOSIS — Z95.828 PRESENCE OF OTHER VASCULAR IMPLANTS AND GRAFTS: Chronic | ICD-10-CM

## 2017-09-29 DIAGNOSIS — Z99.2 DEPENDENCE ON RENAL DIALYSIS: Chronic | ICD-10-CM

## 2017-09-29 LAB
ALBUMIN SERPL ELPH-MCNC: 4 G/DL — SIGNIFICANT CHANGE UP (ref 3.3–5)
ALP SERPL-CCNC: 101 U/L — SIGNIFICANT CHANGE UP (ref 40–120)
ALT FLD-CCNC: 18 U/L RC — SIGNIFICANT CHANGE UP (ref 10–45)
ANION GAP SERPL CALC-SCNC: 27 MMOL/L — HIGH (ref 5–17)
APTT BLD: 33.7 SEC — SIGNIFICANT CHANGE UP (ref 27.5–37.4)
AST SERPL-CCNC: 24 U/L — SIGNIFICANT CHANGE UP (ref 10–40)
BASE EXCESS BLDV CALC-SCNC: -6.6 MMOL/L — LOW (ref -2–2)
BASOPHILS # BLD AUTO: 0 K/UL — SIGNIFICANT CHANGE UP (ref 0–0.2)
BASOPHILS NFR BLD AUTO: 0.5 % — SIGNIFICANT CHANGE UP (ref 0–2)
BILIRUB SERPL-MCNC: 0.4 MG/DL — SIGNIFICANT CHANGE UP (ref 0.2–1.2)
BUN SERPL-MCNC: 75 MG/DL — HIGH (ref 7–23)
CA-I SERPL-SCNC: 1.25 MMOL/L — SIGNIFICANT CHANGE UP (ref 1.12–1.3)
CALCIUM SERPL-MCNC: 10 MG/DL — SIGNIFICANT CHANGE UP (ref 8.4–10.5)
CHLORIDE BLDV-SCNC: 101 MMOL/L — SIGNIFICANT CHANGE UP (ref 96–108)
CHLORIDE SERPL-SCNC: 94 MMOL/L — LOW (ref 96–108)
CO2 BLDV-SCNC: 23 MMOL/L — SIGNIFICANT CHANGE UP (ref 22–30)
CO2 SERPL-SCNC: 18 MMOL/L — LOW (ref 22–31)
CREAT SERPL-MCNC: 7.62 MG/DL — HIGH (ref 0.5–1.3)
EOSINOPHIL # BLD AUTO: 0.4 K/UL — SIGNIFICANT CHANGE UP (ref 0–0.5)
EOSINOPHIL NFR BLD AUTO: 4 % — SIGNIFICANT CHANGE UP (ref 0–6)
GAS PNL BLDV: 134 MMOL/L — LOW (ref 136–145)
GAS PNL BLDV: SIGNIFICANT CHANGE UP
GAS PNL BLDV: SIGNIFICANT CHANGE UP
GLUCOSE BLDV-MCNC: 59 MG/DL — LOW (ref 70–99)
GLUCOSE SERPL-MCNC: 60 MG/DL — LOW (ref 70–99)
HCO3 BLDV-SCNC: 21 MMOL/L — SIGNIFICANT CHANGE UP (ref 21–29)
HCT VFR BLD CALC: 43 % — SIGNIFICANT CHANGE UP (ref 34.5–45)
HCT VFR BLDA CALC: 45 % — SIGNIFICANT CHANGE UP (ref 39–50)
HGB BLD CALC-MCNC: 14.6 G/DL — SIGNIFICANT CHANGE UP (ref 11.5–15.5)
HGB BLD-MCNC: 14.3 G/DL — SIGNIFICANT CHANGE UP (ref 11.5–15.5)
INR BLD: 0.97 RATIO — SIGNIFICANT CHANGE UP (ref 0.88–1.16)
LACTATE BLDV-MCNC: 2 MMOL/L — SIGNIFICANT CHANGE UP (ref 0.7–2)
LIDOCAIN IGE QN: 96 U/L — HIGH (ref 7–60)
LYMPHOCYTES # BLD AUTO: 1.1 K/UL — SIGNIFICANT CHANGE UP (ref 1–3.3)
LYMPHOCYTES # BLD AUTO: 12.4 % — LOW (ref 13–44)
MCHC RBC-ENTMCNC: 32.1 PG — SIGNIFICANT CHANGE UP (ref 27–34)
MCHC RBC-ENTMCNC: 33.3 GM/DL — SIGNIFICANT CHANGE UP (ref 32–36)
MCV RBC AUTO: 96.4 FL — SIGNIFICANT CHANGE UP (ref 80–100)
MONOCYTES # BLD AUTO: 1.2 K/UL — HIGH (ref 0–0.9)
MONOCYTES NFR BLD AUTO: 13.5 % — SIGNIFICANT CHANGE UP (ref 2–14)
NEUTROPHILS # BLD AUTO: 6.2 K/UL — SIGNIFICANT CHANGE UP (ref 1.8–7.4)
NEUTROPHILS NFR BLD AUTO: 69.6 % — SIGNIFICANT CHANGE UP (ref 43–77)
PCO2 BLDV: 54 MMHG — HIGH (ref 35–50)
PH BLDV: 7.22 — LOW (ref 7.35–7.45)
PLATELET # BLD AUTO: 174 K/UL — SIGNIFICANT CHANGE UP (ref 150–400)
PO2 BLDV: 38 MMHG — SIGNIFICANT CHANGE UP (ref 25–45)
POTASSIUM BLDV-SCNC: 3.7 MMOL/L — SIGNIFICANT CHANGE UP (ref 3.5–5)
POTASSIUM SERPL-MCNC: 4.3 MMOL/L — SIGNIFICANT CHANGE UP (ref 3.5–5.3)
POTASSIUM SERPL-SCNC: 4.3 MMOL/L — SIGNIFICANT CHANGE UP (ref 3.5–5.3)
PROT SERPL-MCNC: 7.3 G/DL — SIGNIFICANT CHANGE UP (ref 6–8.3)
PROTHROM AB SERPL-ACNC: 10.5 SEC — SIGNIFICANT CHANGE UP (ref 9.8–12.7)
RBC # BLD: 4.46 M/UL — SIGNIFICANT CHANGE UP (ref 3.8–5.2)
RBC # FLD: 17.2 % — HIGH (ref 10.3–14.5)
SAO2 % BLDV: 56 % — LOW (ref 67–88)
SODIUM SERPL-SCNC: 139 MMOL/L — SIGNIFICANT CHANGE UP (ref 135–145)
WBC # BLD: 8.9 K/UL — SIGNIFICANT CHANGE UP (ref 3.8–10.5)
WBC # FLD AUTO: 8.9 K/UL — SIGNIFICANT CHANGE UP (ref 3.8–10.5)

## 2017-09-29 PROCEDURE — 82435 ASSAY OF BLOOD CHLORIDE: CPT

## 2017-09-29 PROCEDURE — 85730 THROMBOPLASTIN TIME PARTIAL: CPT

## 2017-09-29 PROCEDURE — 84132 ASSAY OF SERUM POTASSIUM: CPT

## 2017-09-29 PROCEDURE — 99284 EMERGENCY DEPT VISIT MOD MDM: CPT

## 2017-09-29 PROCEDURE — 49465 FLUORO EXAM OF G/COLON TUBE: CPT

## 2017-09-29 PROCEDURE — 82330 ASSAY OF CALCIUM: CPT

## 2017-09-29 PROCEDURE — 85610 PROTHROMBIN TIME: CPT

## 2017-09-29 PROCEDURE — 82947 ASSAY GLUCOSE BLOOD QUANT: CPT

## 2017-09-29 PROCEDURE — 99284 EMERGENCY DEPT VISIT MOD MDM: CPT | Mod: 25

## 2017-09-29 PROCEDURE — 83690 ASSAY OF LIPASE: CPT

## 2017-09-29 PROCEDURE — 85027 COMPLETE CBC AUTOMATED: CPT

## 2017-09-29 PROCEDURE — 85014 HEMATOCRIT: CPT

## 2017-09-29 PROCEDURE — 82803 BLOOD GASES ANY COMBINATION: CPT

## 2017-09-29 PROCEDURE — 74177 CT ABD & PELVIS W/CONTRAST: CPT

## 2017-09-29 PROCEDURE — 84295 ASSAY OF SERUM SODIUM: CPT

## 2017-09-29 PROCEDURE — 83605 ASSAY OF LACTIC ACID: CPT

## 2017-09-29 PROCEDURE — 80053 COMPREHEN METABOLIC PANEL: CPT

## 2017-09-29 PROCEDURE — 74177 CT ABD & PELVIS W/CONTRAST: CPT | Mod: 26

## 2017-09-29 RX ORDER — SODIUM CHLORIDE 9 MG/ML
500 INJECTION INTRAMUSCULAR; INTRAVENOUS; SUBCUTANEOUS ONCE
Qty: 0 | Refills: 0 | Status: COMPLETED | OUTPATIENT
Start: 2017-09-29 | End: 2017-09-29

## 2017-09-29 RX ADMIN — SODIUM CHLORIDE 500 MILLILITER(S): 9 INJECTION INTRAMUSCULAR; INTRAVENOUS; SUBCUTANEOUS at 15:10

## 2017-09-29 NOTE — ED PROVIDER NOTE - PHYSICAL EXAMINATION
Attending note. Patient is alert and in no acute distress. Patient is pitting into a cup due to dysphagia which is chronic. Patient has moist mucous membranes. Lungs are clear equal bilaterally. Patient is a 2/6 systolic ejection murmur. Heart is regular rate and rhythm. Abdomen is soft and slightly distended. PEG tube is in place. Patient has tenderness in the periumbilical and right low quadrant area. There is no guarding or rebound. There's no CVA tenderness. Patient has a left subclavian catheter in place.

## 2017-09-29 NOTE — ED PROVIDER NOTE - MEDICAL DECISION MAKING DETAILS
Attending note-abdominal pain in a patient with a PEG tube which is functioning normally, however is inserted through greater depth.  Obstruction. Rule out diverticulitis. CT scan.

## 2017-09-29 NOTE — CHART NOTE - NSCHARTNOTEFT_GEN_A_CORE
Brief GI Consult Note    Was called for Ms. Macias for malfunctioning PEG tube.  She had been experiencing abdominal pain and distention for several days.  PEG was working fine but bumper not near skin.    CT in ED notable for migrated balloon into duodenum  ED staff unable to retract PEG  Reviewed CT, balloon inflated.  At bedside, abdominal exam notable for PEG with bumper out of place.  Slight TTP in LUQ and epigastrium.    Attempted to deflate balloon with 10cc syringe, however, port occluded.  Used needle with syringe and successfully removed approx 8cc fluid from balloon.   PEG was successfully removed with balloon deflated.  Placed new PEG, 18F with balloon in tract.    Recommend PEG Xray study to assess for correct positioning.  If in place, ok to discharge home.

## 2017-09-29 NOTE — ED PROVIDER NOTE - OBJECTIVE STATEMENT
Attending note. Patient was seen in fast track room #3. Patient is complaining abdominal pain which is caudally and intermittent for the last week. Patient states the PEG tube is inserted through greater depth and is not able to be retracted. Patient denies any fevers or vomiting. Patient had some slight diarrhea yesterday which was non-bloody. Patient feels a slight abdominal distention. Patient has had a PEG tube for greater than 20 years. Patient sustained a stroke at that time. The PEG tube was last changed in July of this year. The PEG tube is still functioning appropriately. Patient has end-stage renal disease and is on hemodialysis Tuesday Thursday and Saturday. Patient denies any prior abdominal surgery aside from the PEG tube.

## 2017-09-29 NOTE — ED ADULT NURSE NOTE - OBJECTIVE STATEMENT
BIBA from home c/o PEG tube malfunction. States her PEG tube can not advance out or in at site for over one week now. Denies any trauma to the area. Reports she received tube feeds due to dysphagia from a CVA x 20 years now. Patient is normally NPO. Left chest wall permacath noted as well as right arm AV fistula for dialysis. Patient receives dialysis tues, thurs and sat. Denies abdominal pain or bloating but c/o discomfort at site. No bleeding noted at site. Reports she has been able to receive her tube feeds as usual with no issue and is able to aspirate contents from PEG tube with no issue.

## 2017-10-11 ENCOUNTER — INPATIENT (INPATIENT)
Facility: HOSPITAL | Age: 82
LOS: 10 days | Discharge: HOME | DRG: 313 | End: 2017-10-22
Attending: HOSPITALIST | Admitting: HOSPITALIST
Payer: MEDICARE

## 2017-10-11 ENCOUNTER — EMERGENCY (EMERGENCY)
Facility: HOSPITAL | Age: 82
LOS: 0 days | Discharge: ANOTHER HOSPITAL | End: 2017-10-11
Attending: EMERGENCY MEDICINE
Payer: MEDICARE

## 2017-10-11 VITALS
RESPIRATION RATE: 18 BRPM | SYSTOLIC BLOOD PRESSURE: 130 MMHG | DIASTOLIC BLOOD PRESSURE: 73 MMHG | OXYGEN SATURATION: 99 % | HEART RATE: 86 BPM

## 2017-10-11 VITALS
OXYGEN SATURATION: 99 % | SYSTOLIC BLOOD PRESSURE: 142 MMHG | WEIGHT: 130.07 LBS | RESPIRATION RATE: 18 BRPM | HEART RATE: 84 BPM | HEIGHT: 64 IN | TEMPERATURE: 98 F | DIASTOLIC BLOOD PRESSURE: 92 MMHG

## 2017-10-11 VITALS
HEART RATE: 86 BPM | DIASTOLIC BLOOD PRESSURE: 94 MMHG | RESPIRATION RATE: 16 BRPM | SYSTOLIC BLOOD PRESSURE: 146 MMHG | OXYGEN SATURATION: 97 % | TEMPERATURE: 98 F

## 2017-10-11 DIAGNOSIS — J02.9 ACUTE PHARYNGITIS, UNSPECIFIED: ICD-10-CM

## 2017-10-11 DIAGNOSIS — Z93.1 GASTROSTOMY STATUS: Chronic | ICD-10-CM

## 2017-10-11 DIAGNOSIS — R05 COUGH: ICD-10-CM

## 2017-10-11 DIAGNOSIS — Z98.49 CATARACT EXTRACTION STATUS, UNSPECIFIED EYE: Chronic | ICD-10-CM

## 2017-10-11 DIAGNOSIS — Z93.1 GASTROSTOMY STATUS: ICD-10-CM

## 2017-10-11 DIAGNOSIS — Z99.2 DEPENDENCE ON RENAL DIALYSIS: Chronic | ICD-10-CM

## 2017-10-11 DIAGNOSIS — Z95.828 PRESENCE OF OTHER VASCULAR IMPLANTS AND GRAFTS: Chronic | ICD-10-CM

## 2017-10-11 DIAGNOSIS — R09.81 NASAL CONGESTION: ICD-10-CM

## 2017-10-11 DIAGNOSIS — Z88.0 ALLERGY STATUS TO PENICILLIN: ICD-10-CM

## 2017-10-11 DIAGNOSIS — Z79.82 LONG TERM (CURRENT) USE OF ASPIRIN: ICD-10-CM

## 2017-10-11 DIAGNOSIS — F05 DELIRIUM DUE TO KNOWN PHYSIOLOGICAL CONDITION: ICD-10-CM

## 2017-10-11 LAB
ALBUMIN SERPL ELPH-MCNC: 3.4 G/DL — SIGNIFICANT CHANGE UP (ref 3.3–5)
ALBUMIN SERPL ELPH-MCNC: 3.7 G/DL — SIGNIFICANT CHANGE UP (ref 3.3–5)
ALP SERPL-CCNC: 129 U/L — HIGH (ref 40–120)
ALP SERPL-CCNC: 90 U/L — SIGNIFICANT CHANGE UP (ref 40–120)
ALT FLD-CCNC: 34 U/L RC — SIGNIFICANT CHANGE UP (ref 10–45)
ALT FLD-CCNC: 35 U/L — SIGNIFICANT CHANGE UP (ref 12–78)
ANION GAP SERPL CALC-SCNC: 22 MMOL/L — HIGH (ref 5–17)
ANION GAP SERPL CALC-SCNC: 27 MMOL/L — HIGH (ref 5–17)
APTT BLD: 31.9 SEC — SIGNIFICANT CHANGE UP (ref 27.5–37.4)
AST SERPL-CCNC: 28 U/L — SIGNIFICANT CHANGE UP (ref 15–37)
AST SERPL-CCNC: 78 U/L — HIGH (ref 10–40)
BASOPHILS # BLD AUTO: 0 K/UL — SIGNIFICANT CHANGE UP (ref 0–0.2)
BASOPHILS NFR BLD AUTO: 0.1 % — SIGNIFICANT CHANGE UP (ref 0–2)
BILIRUB SERPL-MCNC: 0.3 MG/DL — SIGNIFICANT CHANGE UP (ref 0.2–1.2)
BILIRUB SERPL-MCNC: 0.7 MG/DL — SIGNIFICANT CHANGE UP (ref 0.2–1.2)
BUN SERPL-MCNC: 111 MG/DL — HIGH (ref 7–23)
BUN SERPL-MCNC: 118 MG/DL — HIGH (ref 7–23)
CALCIUM SERPL-MCNC: 10 MG/DL — SIGNIFICANT CHANGE UP (ref 8.5–10.1)
CALCIUM SERPL-MCNC: 8.7 MG/DL — SIGNIFICANT CHANGE UP (ref 8.4–10.5)
CHLORIDE SERPL-SCNC: 96 MMOL/L — SIGNIFICANT CHANGE UP (ref 96–108)
CHLORIDE SERPL-SCNC: 97 MMOL/L — SIGNIFICANT CHANGE UP (ref 96–108)
CK SERPL-CCNC: 201 U/L — HIGH (ref 25–170)
CO2 SERPL-SCNC: 13 MMOL/L — LOW (ref 22–31)
CO2 SERPL-SCNC: 15 MMOL/L — LOW (ref 22–31)
CREAT SERPL-MCNC: 10.8 MG/DL — HIGH (ref 0.5–1.3)
CREAT SERPL-MCNC: 9.74 MG/DL — HIGH (ref 0.5–1.3)
EOSINOPHIL # BLD AUTO: 0 K/UL — SIGNIFICANT CHANGE UP (ref 0–0.5)
EOSINOPHIL NFR BLD AUTO: 0.1 % — SIGNIFICANT CHANGE UP (ref 0–6)
FLUAV SPEC QL CULT: NEGATIVE — SIGNIFICANT CHANGE UP
FLUBV AG SPEC QL IA: NEGATIVE — SIGNIFICANT CHANGE UP
GLUCOSE SERPL-MCNC: 117 MG/DL — HIGH (ref 70–99)
GLUCOSE SERPL-MCNC: 143 MG/DL — HIGH (ref 70–99)
HCT VFR BLD CALC: 41.9 % — SIGNIFICANT CHANGE UP (ref 34.5–45)
HCT VFR BLD CALC: 50.3 % — HIGH (ref 34.5–45)
HGB BLD-MCNC: 13.8 G/DL — SIGNIFICANT CHANGE UP (ref 11.5–15.5)
HGB BLD-MCNC: 15.6 G/DL — HIGH (ref 11.5–15.5)
INR BLD: 0.99 RATIO — SIGNIFICANT CHANGE UP (ref 0.88–1.16)
LYMPHOCYTES # BLD AUTO: 0.8 K/UL — LOW (ref 1–3.3)
LYMPHOCYTES # BLD AUTO: 4.5 % — LOW (ref 13–44)
MAGNESIUM SERPL-MCNC: 3.2 MG/DL — HIGH (ref 1.6–2.6)
MCHC RBC-ENTMCNC: 30.2 PG — SIGNIFICANT CHANGE UP (ref 27–34)
MCHC RBC-ENTMCNC: 31.1 GM/DL — LOW (ref 32–36)
MCHC RBC-ENTMCNC: 31.3 PG — SIGNIFICANT CHANGE UP (ref 27–34)
MCHC RBC-ENTMCNC: 33.1 GM/DL — SIGNIFICANT CHANGE UP (ref 32–36)
MCV RBC AUTO: 94.6 FL — SIGNIFICANT CHANGE UP (ref 80–100)
MCV RBC AUTO: 97.2 FL — SIGNIFICANT CHANGE UP (ref 80–100)
MONOCYTES # BLD AUTO: 0.5 K/UL — SIGNIFICANT CHANGE UP (ref 0–0.9)
MONOCYTES NFR BLD AUTO: 2.9 % — SIGNIFICANT CHANGE UP (ref 2–14)
NEUTROPHILS # BLD AUTO: 15.6 K/UL — HIGH (ref 1.8–7.4)
NEUTROPHILS NFR BLD AUTO: 92.5 % — HIGH (ref 43–77)
PHOSPHATE SERPL-MCNC: 4.3 MG/DL — SIGNIFICANT CHANGE UP (ref 2.5–4.5)
PLATELET # BLD AUTO: 183 K/UL — SIGNIFICANT CHANGE UP (ref 150–400)
PLATELET # BLD AUTO: 254 K/UL — SIGNIFICANT CHANGE UP (ref 150–400)
POTASSIUM SERPL-MCNC: 3.6 MMOL/L — SIGNIFICANT CHANGE UP (ref 3.5–5.3)
POTASSIUM SERPL-MCNC: 8.1 MMOL/L — CRITICAL HIGH (ref 3.5–5.3)
POTASSIUM SERPL-SCNC: 3.6 MMOL/L — SIGNIFICANT CHANGE UP (ref 3.5–5.3)
POTASSIUM SERPL-SCNC: 8.1 MMOL/L — CRITICAL HIGH (ref 3.5–5.3)
PROT SERPL-MCNC: 7.5 G/DL — SIGNIFICANT CHANGE UP (ref 6–8.3)
PROT SERPL-MCNC: 8.3 GM/DL — SIGNIFICANT CHANGE UP (ref 6–8.3)
PROTHROM AB SERPL-ACNC: 10.7 SEC — SIGNIFICANT CHANGE UP (ref 9.8–12.7)
RBC # BLD: 4.42 M/UL — SIGNIFICANT CHANGE UP (ref 3.8–5.2)
RBC # BLD: 5.18 M/UL — SIGNIFICANT CHANGE UP (ref 3.8–5.2)
RBC # FLD: 16.7 % — HIGH (ref 11–15)
RBC # FLD: 17.7 % — HIGH (ref 10.3–14.5)
SODIUM SERPL-SCNC: 134 MMOL/L — LOW (ref 135–145)
SODIUM SERPL-SCNC: 136 MMOL/L — SIGNIFICANT CHANGE UP (ref 135–145)
TROPONIN I SERPL-MCNC: 0.3 NG/ML — HIGH (ref 0.01–0.04)
WBC # BLD: 16.9 K/UL — HIGH (ref 3.8–10.5)
WBC # BLD: 17.1 K/UL — HIGH (ref 3.8–10.5)
WBC # FLD AUTO: 16.9 K/UL — HIGH (ref 3.8–10.5)
WBC # FLD AUTO: 17.1 K/UL — HIGH (ref 3.8–10.5)

## 2017-10-11 PROCEDURE — 70360 X-RAY EXAM OF NECK: CPT | Mod: 26

## 2017-10-11 PROCEDURE — 71010: CPT | Mod: 26

## 2017-10-11 PROCEDURE — 99285 EMERGENCY DEPT VISIT HI MDM: CPT | Mod: GC

## 2017-10-11 PROCEDURE — 71010: CPT | Mod: 26,77

## 2017-10-11 PROCEDURE — 99284 EMERGENCY DEPT VISIT MOD MDM: CPT

## 2017-10-11 RX ORDER — METOCLOPRAMIDE HCL 10 MG
10 TABLET ORAL ONCE
Qty: 0 | Refills: 0 | Status: COMPLETED | OUTPATIENT
Start: 2017-10-11 | End: 2017-10-11

## 2017-10-11 RX ORDER — ACETAMINOPHEN 500 MG
650 TABLET ORAL ONCE
Qty: 0 | Refills: 0 | Status: COMPLETED | OUTPATIENT
Start: 2017-10-11 | End: 2017-10-11

## 2017-10-11 RX ADMIN — Medication 650 MILLIGRAM(S): at 17:55

## 2017-10-11 RX ADMIN — Medication 10 MILLIGRAM(S): at 20:33

## 2017-10-11 NOTE — ED ADULT NURSE NOTE - CHPI ED SYMPTOMS NEG
no fever/no syncope/no back pain/no chest pain/no chills/no nausea/no vomiting/no shortness of breath/no dizziness/no diaphoresis

## 2017-10-11 NOTE — ED PROVIDER NOTE - OBJECTIVE STATEMENT
82 yo F with cough and congestion for 2 weeks.  Pt. says it started as a sore throat, more on R side of throat, then went away, but cough got worse.  she has a hx of stroke with trouble managing secretions and swallowing (peg tube).  Daughter thinks the virus made the secretions worse and she's having trouble clearing them.  No other complaints.   ROS: negative for fever, headache, chest pain, shortness of breath, abd pain, nausea, vomiting, diarrhea, rash, paresthesia, and weakness.   PMH: Chronic renal failure  On hemodialysis T, Th, Sat, History of CVA (cerebrovascular accident)  Left sided weakness, dysphagia, PEG in place, Hypotension, Murmur, Osteoporosis; Meds: see EMR; SH: Denies smoking/drinking/drug use

## 2017-10-11 NOTE — ED PROVIDER NOTE - PHYSICAL EXAMINATION
Vitals: WNL  Gen: AAOx3, NAD, sitting comfortably in stretcher, non toxic, left facial droop (old)  Head: ncat, perrla, eomi b/l  Neck: supple, no lymphadenopathy, no midline deviation  Heart: rrr, no m/r/g  Lungs: CTA b/l, no rales/ronchi/wheezes  Abd: soft, nontender, non-distended, no rebound or guarding, peg tube in place  Ext: no clubbing/cyanosis/edema  Neuro: L sided weakness (old) sensation intact b/l

## 2017-10-11 NOTE — ED PROVIDER NOTE - MEDICAL DECISION MAKING DETAILS
82 yo F with cough and sore throat >1 wk  -r/o pneumonia, basic labs, ekg, cxr, trop, flu swab, neck XR  -f/u results, d/c if benign

## 2017-10-11 NOTE — ED ADULT TRIAGE NOTE - CHIEF COMPLAINT QUOTE
Biba cough and congestion 1 week, denies fever, chills, diarrhea, shortness of breath. HX of Dialysis, last treatment was saturday.

## 2017-10-11 NOTE — ED ADULT NURSE NOTE - OBJECTIVE STATEMENT
83 year old female presented to ED via EMS from [facility] with c/o of chest pain starting at 2100. Pt went to [facility] with c/o of cough. Pt has productive wet cough, clear sputum. Pt currently has no CP, no SOB, nausea/vomiting, numbness/tingling, fever, chills, dizziness, headache. Pt had positive troponin' s x1 at [facility]. Pt a&ox3, lung sounds clear, heart rate regular, EKG completed handed to MD - shows t-wave inversions. Abdomen soft nontender nondistended to palp. Left arm scaring, right arm fistula +bruit/thrill. Pt has hx of ESRD and stroke. IV in left hand 22G and patent placed by [facility]. Pt currently resting in bed, side rails up for safety. Family at bedside. Will continue to monitor and assess while offering support and reassurance.

## 2017-10-12 DIAGNOSIS — N18.6 END STAGE RENAL DISEASE: ICD-10-CM

## 2017-10-12 DIAGNOSIS — I21.4 NON-ST ELEVATION (NSTEMI) MYOCARDIAL INFARCTION: ICD-10-CM

## 2017-10-12 DIAGNOSIS — J05.10 ACUTE EPIGLOTTITIS WITHOUT OBSTRUCTION: ICD-10-CM

## 2017-10-12 DIAGNOSIS — R10.13 EPIGASTRIC PAIN: ICD-10-CM

## 2017-10-12 DIAGNOSIS — R13.10 DYSPHAGIA, UNSPECIFIED: ICD-10-CM

## 2017-10-12 DIAGNOSIS — I95.0 IDIOPATHIC HYPOTENSION: ICD-10-CM

## 2017-10-12 DIAGNOSIS — Z71.89 OTHER SPECIFIED COUNSELING: ICD-10-CM

## 2017-10-12 DIAGNOSIS — R05 COUGH: ICD-10-CM

## 2017-10-12 DIAGNOSIS — I50.32 CHRONIC DIASTOLIC (CONGESTIVE) HEART FAILURE: ICD-10-CM

## 2017-10-12 DIAGNOSIS — J02.9 ACUTE PHARYNGITIS, UNSPECIFIED: ICD-10-CM

## 2017-10-12 DIAGNOSIS — J18.9 PNEUMONIA, UNSPECIFIED ORGANISM: ICD-10-CM

## 2017-10-12 DIAGNOSIS — I95.89 OTHER HYPOTENSION: ICD-10-CM

## 2017-10-12 DIAGNOSIS — J06.9 ACUTE UPPER RESPIRATORY INFECTION, UNSPECIFIED: ICD-10-CM

## 2017-10-12 DIAGNOSIS — N18.9 CHRONIC KIDNEY DISEASE, UNSPECIFIED: ICD-10-CM

## 2017-10-12 LAB
ALBUMIN SERPL ELPH-MCNC: 4.1 G/DL — SIGNIFICANT CHANGE UP (ref 3.3–5)
ALP SERPL-CCNC: 122 U/L — HIGH (ref 40–120)
ALT FLD-CCNC: 26 U/L RC — SIGNIFICANT CHANGE UP (ref 10–45)
ANION GAP SERPL CALC-SCNC: 21 MMOL/L — HIGH (ref 5–17)
ANION GAP SERPL CALC-SCNC: 23 MMOL/L — HIGH (ref 5–17)
ANION GAP SERPL CALC-SCNC: 29 MMOL/L — HIGH (ref 5–17)
ANION GAP SERPL CALC-SCNC: 32 MMOL/L — HIGH (ref 5–17)
ANION GAP SERPL CALC-SCNC: 32 MMOL/L — HIGH (ref 5–17)
APTT BLD: 45.3 SEC — HIGH (ref 27.5–37.4)
AST SERPL-CCNC: 27 U/L — SIGNIFICANT CHANGE UP (ref 10–40)
BASE EXCESS BLDV CALC-SCNC: -5.4 MMOL/L — LOW (ref -2–2)
BASOPHILS # BLD AUTO: 0 K/UL — SIGNIFICANT CHANGE UP (ref 0–0.2)
BASOPHILS NFR BLD AUTO: 0 % — SIGNIFICANT CHANGE UP (ref 0–2)
BILIRUB SERPL-MCNC: 0.3 MG/DL — SIGNIFICANT CHANGE UP (ref 0.2–1.2)
BUN SERPL-MCNC: 106 MG/DL — HIGH (ref 7–23)
BUN SERPL-MCNC: 125 MG/DL — HIGH (ref 7–23)
BUN SERPL-MCNC: 135 MG/DL — HIGH (ref 7–23)
BUN SERPL-MCNC: 81 MG/DL — HIGH (ref 7–23)
BUN SERPL-MCNC: 84 MG/DL — HIGH (ref 7–23)
CA-I SERPL-SCNC: 1.45 MMOL/L — HIGH (ref 1.12–1.3)
CALCIUM SERPL-MCNC: 10.2 MG/DL — SIGNIFICANT CHANGE UP (ref 8.4–10.5)
CALCIUM SERPL-MCNC: 10.7 MG/DL — HIGH (ref 8.4–10.5)
CALCIUM SERPL-MCNC: 11 MG/DL — HIGH (ref 8.4–10.5)
CALCIUM SERPL-MCNC: 11.4 MG/DL — HIGH (ref 8.4–10.5)
CALCIUM SERPL-MCNC: 9.7 MG/DL — SIGNIFICANT CHANGE UP (ref 8.4–10.5)
CHLORIDE BLDV-SCNC: 103 MMOL/L — SIGNIFICANT CHANGE UP (ref 96–108)
CHLORIDE SERPL-SCNC: 90 MMOL/L — LOW (ref 96–108)
CHLORIDE SERPL-SCNC: 91 MMOL/L — LOW (ref 96–108)
CHLORIDE SERPL-SCNC: 92 MMOL/L — LOW (ref 96–108)
CHLORIDE SERPL-SCNC: 96 MMOL/L — SIGNIFICANT CHANGE UP (ref 96–108)
CHLORIDE SERPL-SCNC: 99 MMOL/L — SIGNIFICANT CHANGE UP (ref 96–108)
CK MB BLD-MCNC: 24.3 % — HIGH (ref 0–3.5)
CK MB BLD-MCNC: 34.5 % — HIGH (ref 0–3.5)
CK MB BLD-MCNC: 35.6 % — HIGH (ref 0–3.5)
CK MB BLD-MCNC: 36.8 % — HIGH (ref 0–3.5)
CK MB CFR SERPL CALC: 36.9 NG/ML — HIGH (ref 0–3.8)
CK MB CFR SERPL CALC: 38.6 NG/ML — HIGH (ref 0–3.8)
CK MB CFR SERPL CALC: 40.2 NG/ML — HIGH (ref 0–3.8)
CK MB CFR SERPL CALC: 51.1 NG/ML — HIGH (ref 0–3.8)
CK SERPL-CCNC: 105 U/L — SIGNIFICANT CHANGE UP (ref 25–170)
CK SERPL-CCNC: 107 U/L — SIGNIFICANT CHANGE UP (ref 25–170)
CK SERPL-CCNC: 113 U/L — SIGNIFICANT CHANGE UP (ref 25–170)
CK SERPL-CCNC: 210 U/L — HIGH (ref 25–170)
CO2 BLDV-SCNC: 24 MMOL/L — SIGNIFICANT CHANGE UP (ref 22–30)
CO2 SERPL-SCNC: 16 MMOL/L — LOW (ref 22–31)
CO2 SERPL-SCNC: 17 MMOL/L — LOW (ref 22–31)
CO2 SERPL-SCNC: 18 MMOL/L — LOW (ref 22–31)
CO2 SERPL-SCNC: 18 MMOL/L — LOW (ref 22–31)
CO2 SERPL-SCNC: 19 MMOL/L — LOW (ref 22–31)
CREAT SERPL-MCNC: 11.13 MG/DL — HIGH (ref 0.5–1.3)
CREAT SERPL-MCNC: 11.52 MG/DL — HIGH (ref 0.5–1.3)
CREAT SERPL-MCNC: 7.48 MG/DL — HIGH (ref 0.5–1.3)
CREAT SERPL-MCNC: 7.79 MG/DL — HIGH (ref 0.5–1.3)
CREAT SERPL-MCNC: 9.04 MG/DL — HIGH (ref 0.5–1.3)
EOSINOPHIL # BLD AUTO: 0 K/UL — SIGNIFICANT CHANGE UP (ref 0–0.5)
EOSINOPHIL NFR BLD AUTO: 0.2 % — SIGNIFICANT CHANGE UP (ref 0–6)
GAS PNL BLDV: 137 MMOL/L — SIGNIFICANT CHANGE UP (ref 136–145)
GAS PNL BLDV: SIGNIFICANT CHANGE UP
GLUCOSE BLDC GLUCOMTR-MCNC: 134 MG/DL — HIGH (ref 70–99)
GLUCOSE BLDC GLUCOMTR-MCNC: 89 MG/DL — SIGNIFICANT CHANGE UP (ref 70–99)
GLUCOSE BLDV-MCNC: 155 MG/DL — HIGH (ref 70–99)
GLUCOSE SERPL-MCNC: 107 MG/DL — HIGH (ref 70–99)
GLUCOSE SERPL-MCNC: 133 MG/DL — HIGH (ref 70–99)
GLUCOSE SERPL-MCNC: 143 MG/DL — HIGH (ref 70–99)
GLUCOSE SERPL-MCNC: 144 MG/DL — HIGH (ref 70–99)
GLUCOSE SERPL-MCNC: 159 MG/DL — HIGH (ref 70–99)
HCO3 BLDV-SCNC: 22 MMOL/L — SIGNIFICANT CHANGE UP (ref 21–29)
HCT VFR BLD CALC: 43.4 % — SIGNIFICANT CHANGE UP (ref 34.5–45)
HCT VFR BLD CALC: 43.7 % — SIGNIFICANT CHANGE UP (ref 34.5–45)
HCT VFR BLD CALC: 45.4 % — HIGH (ref 34.5–45)
HCT VFR BLDA CALC: 37 % — LOW (ref 39–50)
HGB BLD CALC-MCNC: 11.9 G/DL — SIGNIFICANT CHANGE UP (ref 11.5–15.5)
HGB BLD-MCNC: 14.4 G/DL — SIGNIFICANT CHANGE UP (ref 11.5–15.5)
HGB BLD-MCNC: 14.4 G/DL — SIGNIFICANT CHANGE UP (ref 11.5–15.5)
HGB BLD-MCNC: 15.1 G/DL — SIGNIFICANT CHANGE UP (ref 11.5–15.5)
INR BLD: 0.98 RATIO — SIGNIFICANT CHANGE UP (ref 0.88–1.16)
LACTATE BLDV-MCNC: 1.9 MMOL/L — SIGNIFICANT CHANGE UP (ref 0.7–2)
LYMPHOCYTES # BLD AUTO: 0.9 K/UL — LOW (ref 1–3.3)
LYMPHOCYTES # BLD AUTO: 4.5 % — LOW (ref 13–44)
MAGNESIUM SERPL-MCNC: 3.3 MG/DL — HIGH (ref 1.6–2.6)
MAGNESIUM SERPL-MCNC: 3.7 MG/DL — HIGH (ref 1.6–2.6)
MCHC RBC-ENTMCNC: 31.3 PG — SIGNIFICANT CHANGE UP (ref 27–34)
MCHC RBC-ENTMCNC: 31.4 PG — SIGNIFICANT CHANGE UP (ref 27–34)
MCHC RBC-ENTMCNC: 31.6 PG — SIGNIFICANT CHANGE UP (ref 27–34)
MCHC RBC-ENTMCNC: 33 GM/DL — SIGNIFICANT CHANGE UP (ref 32–36)
MCHC RBC-ENTMCNC: 33.3 GM/DL — SIGNIFICANT CHANGE UP (ref 32–36)
MCHC RBC-ENTMCNC: 33.4 GM/DL — SIGNIFICANT CHANGE UP (ref 32–36)
MCV RBC AUTO: 94.2 FL — SIGNIFICANT CHANGE UP (ref 80–100)
MCV RBC AUTO: 94.2 FL — SIGNIFICANT CHANGE UP (ref 80–100)
MCV RBC AUTO: 95.9 FL — SIGNIFICANT CHANGE UP (ref 80–100)
MONOCYTES # BLD AUTO: 0.7 K/UL — SIGNIFICANT CHANGE UP (ref 0–0.9)
MONOCYTES NFR BLD AUTO: 3.6 % — SIGNIFICANT CHANGE UP (ref 2–14)
NEUTROPHILS # BLD AUTO: 19 K/UL — HIGH (ref 1.8–7.4)
NEUTROPHILS NFR BLD AUTO: 91.8 % — HIGH (ref 43–77)
OTHER CELLS CSF MANUAL: 12 ML/DL — LOW (ref 18–22)
PCO2 BLDV: 55 MMHG — HIGH (ref 35–50)
PH BLDV: 7.23 — LOW (ref 7.35–7.45)
PHOSPHATE SERPL-MCNC: 3.5 MG/DL — SIGNIFICANT CHANGE UP (ref 2.5–4.5)
PHOSPHATE SERPL-MCNC: 4.4 MG/DL — SIGNIFICANT CHANGE UP (ref 2.5–4.5)
PLATELET # BLD AUTO: 229 K/UL — SIGNIFICANT CHANGE UP (ref 150–400)
PLATELET # BLD AUTO: 259 K/UL — SIGNIFICANT CHANGE UP (ref 150–400)
PLATELET # BLD AUTO: 281 K/UL — SIGNIFICANT CHANGE UP (ref 150–400)
PO2 BLDV: 49 MMHG — HIGH (ref 25–45)
POTASSIUM BLDV-SCNC: 2.4 MMOL/L — CRITICAL LOW (ref 3.5–5)
POTASSIUM SERPL-MCNC: 2.6 MMOL/L — CRITICAL LOW (ref 3.5–5.3)
POTASSIUM SERPL-MCNC: 3 MMOL/L — LOW (ref 3.5–5.3)
POTASSIUM SERPL-MCNC: 3.2 MMOL/L — LOW (ref 3.5–5.3)
POTASSIUM SERPL-MCNC: 3.6 MMOL/L — SIGNIFICANT CHANGE UP (ref 3.5–5.3)
POTASSIUM SERPL-MCNC: 7.7 MMOL/L — CRITICAL HIGH (ref 3.5–5.3)
POTASSIUM SERPL-SCNC: 2.6 MMOL/L — CRITICAL LOW (ref 3.5–5.3)
POTASSIUM SERPL-SCNC: 3 MMOL/L — LOW (ref 3.5–5.3)
POTASSIUM SERPL-SCNC: 3.2 MMOL/L — LOW (ref 3.5–5.3)
POTASSIUM SERPL-SCNC: 3.6 MMOL/L — SIGNIFICANT CHANGE UP (ref 3.5–5.3)
POTASSIUM SERPL-SCNC: 7.7 MMOL/L — CRITICAL HIGH (ref 3.5–5.3)
PROT SERPL-MCNC: 8.1 G/DL — SIGNIFICANT CHANGE UP (ref 6–8.3)
PROTHROM AB SERPL-ACNC: 10.7 SEC — SIGNIFICANT CHANGE UP (ref 9.8–12.7)
RAPID RVP RESULT: SIGNIFICANT CHANGE UP
RBC # BLD: 4.55 M/UL — SIGNIFICANT CHANGE UP (ref 3.8–5.2)
RBC # BLD: 4.61 M/UL — SIGNIFICANT CHANGE UP (ref 3.8–5.2)
RBC # BLD: 4.82 M/UL — SIGNIFICANT CHANGE UP (ref 3.8–5.2)
RBC # FLD: 17 % — HIGH (ref 10.3–14.5)
RBC # FLD: 17.1 % — HIGH (ref 10.3–14.5)
RBC # FLD: 17.3 % — HIGH (ref 10.3–14.5)
SAO2 % BLDV: 75 % — SIGNIFICANT CHANGE UP (ref 67–88)
SODIUM SERPL-SCNC: 136 MMOL/L — SIGNIFICANT CHANGE UP (ref 135–145)
SODIUM SERPL-SCNC: 139 MMOL/L — SIGNIFICANT CHANGE UP (ref 135–145)
SODIUM SERPL-SCNC: 140 MMOL/L — SIGNIFICANT CHANGE UP (ref 135–145)
TROPONIN T SERPL-MCNC: 0.1 NG/ML — HIGH (ref 0–0.06)
TROPONIN T SERPL-MCNC: 0.11 NG/ML — HIGH (ref 0–0.06)
TROPONIN T SERPL-MCNC: 0.11 NG/ML — HIGH (ref 0–0.06)
WBC # BLD: 19.4 K/UL — HIGH (ref 3.8–10.5)
WBC # BLD: 20 K/UL — HIGH (ref 3.8–10.5)
WBC # BLD: 20.7 K/UL — HIGH (ref 3.8–10.5)
WBC # FLD AUTO: 19.4 K/UL — HIGH (ref 3.8–10.5)
WBC # FLD AUTO: 20 K/UL — HIGH (ref 3.8–10.5)
WBC # FLD AUTO: 20.7 K/UL — HIGH (ref 3.8–10.5)

## 2017-10-12 PROCEDURE — 99222 1ST HOSP IP/OBS MODERATE 55: CPT | Mod: 25

## 2017-10-12 PROCEDURE — 12345: CPT | Mod: NC

## 2017-10-12 PROCEDURE — 99222 1ST HOSP IP/OBS MODERATE 55: CPT

## 2017-10-12 PROCEDURE — 99497 ADVNCD CARE PLAN 30 MIN: CPT | Mod: 25

## 2017-10-12 PROCEDURE — 31575 DIAGNOSTIC LARYNGOSCOPY: CPT

## 2017-10-12 PROCEDURE — 93010 ELECTROCARDIOGRAM REPORT: CPT

## 2017-10-12 PROCEDURE — 99223 1ST HOSP IP/OBS HIGH 75: CPT

## 2017-10-12 RX ORDER — IPRATROPIUM/ALBUTEROL SULFATE 18-103MCG
3 AEROSOL WITH ADAPTER (GRAM) INHALATION EVERY 6 HOURS
Qty: 0 | Refills: 0 | Status: DISCONTINUED | OUTPATIENT
Start: 2017-10-12 | End: 2017-10-13

## 2017-10-12 RX ORDER — MIDODRINE HYDROCHLORIDE 2.5 MG/1
10 TABLET ORAL ONCE
Qty: 0 | Refills: 0 | Status: COMPLETED | OUTPATIENT
Start: 2017-10-12 | End: 2017-10-12

## 2017-10-12 RX ORDER — DOCUSATE SODIUM 100 MG
100 CAPSULE ORAL THREE TIMES A DAY
Qty: 0 | Refills: 0 | Status: DISCONTINUED | OUTPATIENT
Start: 2017-10-12 | End: 2017-10-12

## 2017-10-12 RX ORDER — AZITHROMYCIN 500 MG/1
500 TABLET, FILM COATED ORAL EVERY 24 HOURS
Qty: 0 | Refills: 0 | Status: DISCONTINUED | OUTPATIENT
Start: 2017-10-13 | End: 2017-10-13

## 2017-10-12 RX ORDER — MIDODRINE HYDROCHLORIDE 2.5 MG/1
15 TABLET ORAL
Qty: 0 | Refills: 0 | Status: DISCONTINUED | OUTPATIENT
Start: 2017-10-12 | End: 2017-10-13

## 2017-10-12 RX ORDER — SEVELAMER CARBONATE 2400 MG/1
2 POWDER, FOR SUSPENSION ORAL
Qty: 0 | Refills: 0 | COMMUNITY

## 2017-10-12 RX ORDER — BRIMONIDINE TARTRATE 2 MG/MG
1 SOLUTION/ DROPS OPHTHALMIC THREE TIMES A DAY
Qty: 0 | Refills: 0 | Status: DISCONTINUED | OUTPATIENT
Start: 2017-10-12 | End: 2017-10-13

## 2017-10-12 RX ORDER — MIDODRINE HYDROCHLORIDE 2.5 MG/1
5 TABLET ORAL
Qty: 0 | Refills: 0 | Status: DISCONTINUED | OUTPATIENT
Start: 2017-10-12 | End: 2017-10-13

## 2017-10-12 RX ORDER — TRAVOPROST 0.04 MG/ML
1 SOLUTION/ DROPS OPHTHALMIC
Qty: 0 | Refills: 0 | COMMUNITY

## 2017-10-12 RX ORDER — ALBUTEROL 90 UG/1
10 AEROSOL, METERED ORAL ONCE
Qty: 0 | Refills: 0 | Status: DISCONTINUED | OUTPATIENT
Start: 2017-10-12 | End: 2017-10-13

## 2017-10-12 RX ORDER — AZTREONAM 2 G
VIAL (EA) INJECTION
Qty: 0 | Refills: 0 | Status: DISCONTINUED | OUTPATIENT
Start: 2017-10-12 | End: 2017-10-13

## 2017-10-12 RX ORDER — HEPARIN SODIUM 5000 [USP'U]/ML
5000 INJECTION INTRAVENOUS; SUBCUTANEOUS EVERY 8 HOURS
Qty: 0 | Refills: 0 | Status: DISCONTINUED | OUTPATIENT
Start: 2017-10-12 | End: 2017-10-13

## 2017-10-12 RX ORDER — BENZOCAINE AND MENTHOL 5; 1 G/100ML; G/100ML
1 LIQUID ORAL EVERY 6 HOURS
Qty: 0 | Refills: 0 | Status: DISCONTINUED | OUTPATIENT
Start: 2017-10-12 | End: 2017-10-13

## 2017-10-12 RX ORDER — FAMOTIDINE 10 MG/ML
20 INJECTION INTRAVENOUS DAILY
Qty: 0 | Refills: 0 | Status: DISCONTINUED | OUTPATIENT
Start: 2017-10-12 | End: 2017-10-13

## 2017-10-12 RX ORDER — CALCIUM GLUCONATE 100 MG/ML
2 VIAL (ML) INTRAVENOUS ONCE
Qty: 0 | Refills: 0 | Status: DISCONTINUED | OUTPATIENT
Start: 2017-10-12 | End: 2017-10-12

## 2017-10-12 RX ORDER — DOCUSATE SODIUM 100 MG
100 CAPSULE ORAL THREE TIMES A DAY
Qty: 0 | Refills: 0 | Status: DISCONTINUED | OUTPATIENT
Start: 2017-10-12 | End: 2017-10-13

## 2017-10-12 RX ORDER — VANCOMYCIN HCL 1 G
1000 VIAL (EA) INTRAVENOUS ONCE
Qty: 0 | Refills: 0 | Status: DISCONTINUED | OUTPATIENT
Start: 2017-10-12 | End: 2017-10-13

## 2017-10-12 RX ORDER — CALCIUM CHLORIDE
2 POWDER (GRAM) MISCELLANEOUS ONCE
Qty: 0 | Refills: 0 | Status: DISCONTINUED | OUTPATIENT
Start: 2017-10-12 | End: 2017-10-13

## 2017-10-12 RX ORDER — ASPIRIN/CALCIUM CARB/MAGNESIUM 324 MG
81 TABLET ORAL DAILY
Qty: 0 | Refills: 0 | Status: DISCONTINUED | OUTPATIENT
Start: 2017-10-12 | End: 2017-10-13

## 2017-10-12 RX ORDER — AZITHROMYCIN 500 MG/1
500 TABLET, FILM COATED ORAL ONCE
Qty: 0 | Refills: 0 | Status: COMPLETED | OUTPATIENT
Start: 2017-10-12 | End: 2017-10-12

## 2017-10-12 RX ORDER — OXYCODONE AND ACETAMINOPHEN 5; 325 MG/1; MG/1
1 TABLET ORAL EVERY 4 HOURS
Qty: 0 | Refills: 0 | Status: DISCONTINUED | OUTPATIENT
Start: 2017-10-12 | End: 2017-10-12

## 2017-10-12 RX ORDER — ACETYLCYSTEINE 200 MG/ML
5 VIAL (ML) MISCELLANEOUS
Qty: 0 | Refills: 0 | Status: DISCONTINUED | OUTPATIENT
Start: 2017-10-12 | End: 2017-10-13

## 2017-10-12 RX ORDER — PANTOPRAZOLE SODIUM 20 MG/1
40 TABLET, DELAYED RELEASE ORAL
Qty: 0 | Refills: 0 | Status: DISCONTINUED | OUTPATIENT
Start: 2017-10-12 | End: 2017-10-13

## 2017-10-12 RX ORDER — SIMVASTATIN 20 MG/1
40 TABLET, FILM COATED ORAL AT BEDTIME
Qty: 0 | Refills: 0 | Status: DISCONTINUED | OUTPATIENT
Start: 2017-10-12 | End: 2017-10-13

## 2017-10-12 RX ORDER — AZTREONAM 2 G
500 VIAL (EA) INJECTION EVERY 8 HOURS
Qty: 0 | Refills: 0 | Status: DISCONTINUED | OUTPATIENT
Start: 2017-10-13 | End: 2017-10-13

## 2017-10-12 RX ORDER — AZTREONAM 2 G
500 VIAL (EA) INJECTION ONCE
Qty: 0 | Refills: 0 | Status: DISCONTINUED | OUTPATIENT
Start: 2017-10-12 | End: 2017-10-13

## 2017-10-12 RX ORDER — SEVELAMER CARBONATE 2400 MG/1
1600 POWDER, FOR SUSPENSION ORAL
Qty: 0 | Refills: 0 | Status: DISCONTINUED | OUTPATIENT
Start: 2017-10-12 | End: 2017-10-13

## 2017-10-12 RX ORDER — DORZOLAMIDE HYDROCHLORIDE TIMOLOL MALEATE 20; 5 MG/ML; MG/ML
1 SOLUTION/ DROPS OPHTHALMIC DAILY
Qty: 0 | Refills: 0 | Status: DISCONTINUED | OUTPATIENT
Start: 2017-10-12 | End: 2017-10-13

## 2017-10-12 RX ORDER — LATANOPROST 0.05 MG/ML
1 SOLUTION/ DROPS OPHTHALMIC; TOPICAL AT BEDTIME
Qty: 0 | Refills: 0 | Status: DISCONTINUED | OUTPATIENT
Start: 2017-10-12 | End: 2017-10-13

## 2017-10-12 RX ORDER — AZITHROMYCIN 500 MG/1
TABLET, FILM COATED ORAL
Qty: 0 | Refills: 0 | Status: DISCONTINUED | OUTPATIENT
Start: 2017-10-12 | End: 2017-10-13

## 2017-10-12 RX ORDER — SODIUM POLYSTYRENE SULFONATE 4.1 MEQ/G
30 POWDER, FOR SUSPENSION ORAL ONCE
Qty: 0 | Refills: 0 | Status: DISCONTINUED | OUTPATIENT
Start: 2017-10-12 | End: 2017-10-13

## 2017-10-12 RX ADMIN — Medication 81 MILLIGRAM(S): at 12:43

## 2017-10-12 RX ADMIN — MIDODRINE HYDROCHLORIDE 15 MILLIGRAM(S): 2.5 TABLET ORAL at 06:44

## 2017-10-12 RX ADMIN — Medication 3 MILLILITER(S): at 17:47

## 2017-10-12 RX ADMIN — Medication 3 MILLILITER(S): at 12:44

## 2017-10-12 RX ADMIN — BRIMONIDINE TARTRATE 1 DROP(S): 2 SOLUTION/ DROPS OPHTHALMIC at 06:41

## 2017-10-12 RX ADMIN — FAMOTIDINE 20 MILLIGRAM(S): 10 INJECTION INTRAVENOUS at 17:49

## 2017-10-12 RX ADMIN — OXYCODONE AND ACETAMINOPHEN 1 TABLET(S): 5; 325 TABLET ORAL at 17:38

## 2017-10-12 RX ADMIN — OXYCODONE AND ACETAMINOPHEN 1 TABLET(S): 5; 325 TABLET ORAL at 18:08

## 2017-10-12 RX ADMIN — MIDODRINE HYDROCHLORIDE 10 MILLIGRAM(S): 2.5 TABLET ORAL at 14:20

## 2017-10-12 RX ADMIN — HEPARIN SODIUM 5000 UNIT(S): 5000 INJECTION INTRAVENOUS; SUBCUTANEOUS at 21:23

## 2017-10-12 RX ADMIN — BENZOCAINE AND MENTHOL 1 LOZENGE: 5; 1 LIQUID ORAL at 12:05

## 2017-10-12 RX ADMIN — SEVELAMER CARBONATE 1600 MILLIGRAM(S): 2400 POWDER, FOR SUSPENSION ORAL at 17:47

## 2017-10-12 RX ADMIN — OXYCODONE AND ACETAMINOPHEN 1 TABLET(S): 5; 325 TABLET ORAL at 09:59

## 2017-10-12 RX ADMIN — OXYCODONE AND ACETAMINOPHEN 1 TABLET(S): 5; 325 TABLET ORAL at 09:29

## 2017-10-12 RX ADMIN — Medication 5 MILLILITER(S): at 17:46

## 2017-10-12 RX ADMIN — LATANOPROST 1 DROP(S): 0.05 SOLUTION/ DROPS OPHTHALMIC; TOPICAL at 21:23

## 2017-10-12 RX ADMIN — AZITHROMYCIN 250 MILLIGRAM(S): 500 TABLET, FILM COATED ORAL at 17:51

## 2017-10-12 RX ADMIN — HEPARIN SODIUM 5000 UNIT(S): 5000 INJECTION INTRAVENOUS; SUBCUTANEOUS at 06:42

## 2017-10-12 RX ADMIN — Medication 100 MILLIGRAM(S): at 21:22

## 2017-10-12 RX ADMIN — BRIMONIDINE TARTRATE 1 DROP(S): 2 SOLUTION/ DROPS OPHTHALMIC at 17:45

## 2017-10-12 RX ADMIN — Medication 250 MILLIGRAM(S): at 23:00

## 2017-10-12 RX ADMIN — BRIMONIDINE TARTRATE 1 DROP(S): 2 SOLUTION/ DROPS OPHTHALMIC at 21:22

## 2017-10-12 RX ADMIN — SIMVASTATIN 40 MILLIGRAM(S): 20 TABLET, FILM COATED ORAL at 21:23

## 2017-10-12 RX ADMIN — SEVELAMER CARBONATE 1600 MILLIGRAM(S): 2400 POWDER, FOR SUSPENSION ORAL at 12:43

## 2017-10-12 RX ADMIN — HEPARIN SODIUM 5000 UNIT(S): 5000 INJECTION INTRAVENOUS; SUBCUTANEOUS at 17:47

## 2017-10-12 RX ADMIN — Medication 5 MILLILITER(S): at 06:36

## 2017-10-12 RX ADMIN — DORZOLAMIDE HYDROCHLORIDE TIMOLOL MALEATE 1 DROP(S): 20; 5 SOLUTION/ DROPS OPHTHALMIC at 17:48

## 2017-10-12 NOTE — CONSULT NOTE ADULT - ATTENDING COMMENTS
Patient scoped and without evidence of epiglottitis or any upper airway swelling. Cords moving well. Pooling of secretions at postcricoid space. Consider GI consult. Please call with questions.
await CT chest/abd  not much seen on ENT exam

## 2017-10-12 NOTE — ED PROVIDER NOTE - OBJECTIVE STATEMENT
83 y.o. female with multiple medical problems including ESRD on HD transfer from [facility] for abnormal EKG with elevated troponin for cath consult. Pt states that she presented to ED there with nonproductive cough, sore throat and neck pain. Pt missed dialysis 83 y.o. female with multiple medical problems including ESRD on HD transfer from [facility] for abnormal EKG with elevated troponin for cath consult. Pt states that she presented to ED there with nonproductive cough, sore throat and neck pain. Pt missed dialysis yesterday. Currently denies chest pain, sob.

## 2017-10-12 NOTE — CONSULT NOTE ADULT - SUBJECTIVE AND OBJECTIVE BOX
CC: R/O epiglottitis    HPI: 83F c hx HLD, CVA, dysphagia s/p PEG, grade 1 DHF, LVOT obstruction, GERD, ESRD, left arm vascular stent, hypotension requiring midodrine, presented to [place] for worsening cough for 10 days, transferred to [facility] for NSTEMI. At baseline, pt unable to swallow secretions so she drools/spits out saliva. PEG tube dependant. Pt states she has been experiencing worsening cough x 10 days and right sided throat pain. Cough produces phlegm which is gray or white in color. Pt has been gradually coughing up copious amount of saliva more than her baseline. ENT consulted to evaluate for possible epiglottitis. X-ray of neck today during admission revealed mild fullness of the epiglottis. WBC 20.7. Pt denies and shortness of breath, N/V sore throat, nasal congestion, otalgia, headaches, changes in vision, or voice.    PAST MEDICAL & SURGICAL HISTORY:  Hypotension  Murmur  Osteoporosis  Chronic renal failure: On hemodialysis T, Th, Sat  History of CVA (cerebrovascular accident): Left sided weakness, dysphagia, PEG in place  Status post insertion of dialysis catheter: 2017  S/P gastrostomy: PEG tube  Arteriovenous graft for hemodialysis in place, primary: 1991  non functioning  S/P cataract extraction: Bilateral    Allergies    MSG causes dizziness (Other)  pcn (Hives)  penicillin (Hives)    Intolerances      MEDICATIONS  (STANDING):  acetylcysteine 10% Inhalation 5 milliLiter(s) Inhalation two times a day  ALBUTerol/ipratropium for Nebulization 3 milliLiter(s) Nebulizer every 6 hours  aspirin  chewable 81 milliGRAM(s) Enteral Tube daily  azithromycin  IVPB      azithromycin  IVPB 500 milliGRAM(s) IV Intermittent once  brimonidine 0.2% Ophthalmic Solution 1 Drop(s) Both EYES three times a day  docusate sodium Liquid 100 milliGRAM(s) Oral three times a day  dorzolamide 2%/timolol 0.5% Ophthalmic Solution 1 Drop(s) Both EYES daily  famotidine   Suspension 20 milliGRAM(s) Enteral Tube daily  heparin  Injectable 5000 Unit(s) SubCutaneous every 8 hours  latanoprost 0.005% Ophthalmic Solution 1 Drop(s) Both EYES at bedtime  midodrine 15 milliGRAM(s) Oral <User Schedule>  midodrine 5 milliGRAM(s) Oral <User Schedule>  pantoprazole    Tablet 40 milliGRAM(s) Oral before breakfast  sevelamer hydrochloride 1600 milliGRAM(s) Oral three times a day with meals  simvastatin 40 milliGRAM(s) Oral at bedtime    MEDICATIONS  (PRN):  benzocaine 15 mG/menthol 3.6 mG Lozenge 1 Lozenge Oral every 6 hours PRN Sore Throat  oxyCODONE    5 mG/acetaminophen 325 mG 1 Tablet(s) Oral every 4 hours PRN Moderate Pain (4 - 6)    Social History: Pt denies history of tobacco use, EtOH use, illicit drugs    ROS: ENT, GI, , CV, Pulm, Neuro, Psych, MS, Heme, Endo, Constitutional; all negative except as noted in HPI    Vital Signs Last 24 Hrs  T(C): 36.5 (12 Oct 2017 16:04), Max: 36.8 (11 Oct 2017 19:48)  T(F): 97.7 (12 Oct 2017 16:04), Max: 98.2 (11 Oct 2017 19:48)  HR: 88 (12 Oct 2017 16:04) (72 - 88)  BP: 131/85 (12 Oct 2017 16:04) (106/81 - 146/94)  BP(mean): --  RR: 18 (12 Oct 2017 16:04) (16 - 18)  SpO2: 98% (12 Oct 2017 16:04) (97% - 100%)                          14.4   20.7  )-----------( 281      ( 12 Oct 2017 15:57 )             43.4    10-12    139  |  92<L>  |  106<H>  ----------------------------<  133<H>  3.0<L>   |  18<L>  |  9.04<H>    Ca    10.2      12 Oct 2017 15:58  Phos  3.5     10-12  Mg     3.3     10-12    TPro  8.1  /  Alb  4.1  /  TBili  0.3  /  DBili  x   /  AST  27  /  ALT  26  /  AlkPhos  122<H>  10-12   PT/INR - ( 11 Oct 2017 22:19 )   PT: 10.7 sec;   INR: 0.99 ratio         PTT - ( 11 Oct 2017 22:19 )  PTT:31.9 sec    PHYSICAL EXAM:  Gen: Awake and alert, sitting up, speaking in full sentences, voice baseline, NAD, well-developed  Head: Normocephalic, Atraumatic  Face: no edema/erythema/fluctuance, parotid glands soft without mass  Eyes: PERRL, EOMI, no scleral injection  Ears: Right - ear canal clear, TM intact without effusion            Left - ear canal clear, TM intact without effusion  Nose: Nares bilaterally patent, no discharge. Nasal cannula in place  Mouth: Mucosa moist, tongue/uvula midline, oropharynx clear-no edema, erythema  Neck: Flat, supple, no lymphadenopathy, trachea midline, no masses  Resp: breathing easily, no stridor  CV: No peripheral edema or cyanosis      FOE/LARYNGOSCOPY:(Performed by Dr. Martinez) Nasopharynx, oropharynx, hypopharynx clear. Airway widely patent. Vocal cords intact and in motion b/l. No evidence of bleeding, obstruction, edema, erythema, lesions, masses, exudates, pooling of secretion or reflux. No edema, erythema or exudates of epiglottis. Airway widely patent.

## 2017-10-12 NOTE — H&P ADULT - PROBLEM SELECTOR PLAN 2
- seen by cards  - dynamic TWI concerning for ?type 2 nstemi  - trend CE  - tele  - TTE  - cont asa  - hold off hep gtt

## 2017-10-12 NOTE — CONSULT NOTE ADULT - SUBJECTIVE AND OBJECTIVE BOX
Chief Complaint:  chest pain     HPI:    Ms. Macias is an 84 yo lady w/ hx of ESRD (on HD Tues/Thurs/Sat), HTN, CVA (~20 years ago; s/p PEG w/ left hemiparesis/dysphagia at baseline), LVOT obstruction (100 mmHg) who presented to OSH w/ 1 week hx of sore throat/chest pain.  Patient has been endorsing cough/sore throat for 1 week as well as chest pain, which is describes as achy throughout the chest, worsening by cough/movements.  She presented to the OSH w/ above sx's.  Initial trop I was ~ 0.3, and was transferred to our ED for further evaluation.  Pt makes no urine, and missed her Tuesday HD tx.  Otherwise no other concerning sx's.      PMH:   Hypotension  Murmur  Osteoporosis  Chronic renal failure  History of CVA (cerebrovascular accident)      PSH:   Status post insertion of dialysis catheter  S/P gastrostomy  Arteriovenous graft for hemodialysis in place, primary  S/P cataract extraction      Medications:       Allergies:  MSG causes dizziness (Other)  pcn (Hives)  penicillin (Hives)      FAMILY HISTORY:  Family history of breast cancer (Grandparent)  Family history of heart disease  History of hypertension      Social History:  Smoking History:  Alcohol Use:  Drug Use:    Review of Systems:  REVIEW OF SYSTEMS:    CONSTITUTIONAL: No weakness, fevers or chills  EYES/ENT: No visual changes;  No dysphagia  NECK: No pain or stiffness  RESPIRATORY: +cough, no wheezing, hemoptysis; +shortness of breath  CARDIOVASCULAR: +chest pain or palpitations; No lower extremity edema  GASTROINTESTINAL: No abdominal or epigastric pain. No nausea, vomiting, or hematemesis; No diarrhea or constipation. No melena or hematochezia.  BACK: No back pain  GENITOURINARY: No dysuria, frequency or hematuria  NEUROLOGICAL: No numbness or weakness  SKIN: No itching, burning, rashes, or lesions   All other review of systems is negative unless indicated above.    Physical Exam:  T(F): 98.2 (10-11), Max: 98.2 (10-11)  HR: 86 (10-11) (84 - 86)  BP: 130/73 (10-11) (130/73 - 146/94)  RR: 18 (10-11)  SpO2: 99% (10-11)    GENERAL: No acute distress, well-developed  HEAD:  Atraumatic, Normocephalic  ENT: EOMI, PERRLA, conjunctiva and sclera clear, Neck supple, No JVD, moist mucosa  CHEST/LUNG: Diffuse rales w/ no wheezing/rhonchi  BACK: No spinal tenderness  HEART: Regular rate and rhythm; II/VI LOU audible in R second intercostal space/radiation to carotids, no rubs, or gallops  ABDOMEN: Soft, Nontender, Nondistended; Bowel sounds present  EXTREMITIES:  No clubbing, cyanosis, or edema  PSYCH: Nl behavior, nl affect  NEUROLOGY: AAOx3, non-focal, cranial nerves intact  SKIN: Normal color, No rashes or lesions    LINES:    Cardiovascular Diagnostic Testing:    ECG: NSR, normal axis, LVH, no new/acute ischemic changes     Echo 5/2017:  EF>70%, LVOT obstruction w/ LVH, possible "TRICE" (in allscripts). Aortic calcification     Stress Testing:    Cath:    Interpretation of Telemetry:    Imaging:    Labs: Personally reviewed                        13.8   16.9  )-----------( 183      ( 11 Oct 2017 22:19 )             41.9     10-12    139  |  91<L>  |  125<H>  ----------------------------<  143<H>  3.6   |  16<L>  |  11.13<H>    Ca    10.7<H>      12 Oct 2017 00:25  Phos  4.3     10-11  Mg     3.2     10-11    TPro  7.5  /  Alb  3.7  /  TBili  0.3  /  DBili  x   /  AST  78<H>  /  ALT  34  /  AlkPhos  90  10-11    PT/INR - ( 11 Oct 2017 22:19 )   PT: 10.7 sec;   INR: 0.99 ratio         PTT - ( 11 Oct 2017 22:19 )  PTT:31.9 sec  CARDIAC MARKERS ( 12 Oct 2017 00:25 )  x     / 0.10 ng/mL / x     / x     / x      CARDIAC MARKERS ( 11 Oct 2017 22:19 )  x     / x     / 201 U/L / x     / x      CARDIAC MARKERS ( 11 Oct 2017 18:01 )  .304 ng/mL / x     / x     / x     / x        84 yo lady w/ hx of ESRD, LVOT obstruction/LVH (possible AS?) who presents for sx's c/w URI and atypical chest pain, likely MSK.  Elevated Trops likely due to fluid retention from missed HD treatment in the setting of ESRD.  Unlikely ACS.     plan   - admit to telemetry   - monitor Brandon; once flat (trop T); stop checking   - cont rest of meds  - supportive care for URI   - obtain TTE to evaluate Aortic valve area, LVOT     Gunnar Toro MD  [facility] fellow   [id number] Chief Complaint:  chest pain     HPI:    Carla Macias is an 82 yo F w/ hx of ESRD (on HD Tues/Thurs/Sat), HTN, CVA (~20 years ago; s/p PEG w/ left hemiparesis/dysphagia at baseline), LVOT obstruction (100 mmHg).  Presented to OSH w/ 1 week hx of sore throat/chest pain.  Patient has been reporting cough/sore throat for 1 week as well as chest pain, which is describes as achy throughout the chest, worsening by cough/movements.  She presented to the OSH w/ above sx's.  Initial trop I was ~ 0.3, and was transferred to our ED for further evaluation.  Pt makes no urine, and missed her Tuesday HD tx.  Otherwise no other concerning sx's.      PMH:   Hypotension  Murmur  Osteoporosis  Chronic renal failure  History of CVA (cerebrovascular accident)    PSH:   Status post insertion of dialysis catheter  S/P gastrostomy  Arteriovenous graft for hemodialysis in place, primary  S/P cataract extraction    MEDS:  midodrine 5 mg oral tablet: 3 tab(s) orally once a day on tuesday, thursday and saturdays (pre dialysis), Last Dose Taken:    midodrine 5 mg oral tablet: 1 tab(s) orally Monday, Wednesday, Friday and sunday ( non dialysis days, Last Dose Taken:    Darcy-Melody oral tablet: 1 tab(s) by gastrostomy tube once a day, Last Dose Taken:    simvastatin 40 mg oral tablet: 1 tab(s) by gastrostomy tube once a day (at bedtime), Last Dose Taken:    brimonidine 0.2% ophthalmic solution: 1 drop(s) to each affected eye 3 times a day, Last Dose Taken:    Cosopt 2.23%-0.68% ophthalmic solution: 1 drop(s) to each affected eye 2 times a day, Last Dose Taken:    Travatan Z 0.004% ophthalmic solution: 1 drop(s) to each affected eye once a day (in the evening), Last Dose Taken:    Pepcid AC 10 mg oral tablet: 1 tab(s) orally 2 times a day, As Needed, Last Dose Taken:    aspirin 81 mg oral tablet, chewable: 1 tab(s) orally once a day, As Needed, Last Dose Taken:    Renagel 800 mg oral tablet: 2 tab(s) orally 3 times a day, Last Dose Taken:    omeprazole 40 mg oral delayed release capsule: 1 cap(s) orally once a day, Last Dose Taken:      Allergies:  MSG causes dizziness (Other)  pcn (Hives)  penicillin (Hives)    FAMILY HISTORY:  Family history of breast cancer (Grandparent)  Family history of heart disease  History of hypertension    Social History:  , no tobacco use, rare alcohol    REVIEW OF SYSTEMS:  CONSTITUTIONAL: No weakness, fevers or chills  EYES/ENT: No visual changes;  No dysphagia  NECK: No pain or stiffness  RESPIRATORY: +cough, no wheezing, hemoptysis; +shortness of breath  CARDIOVASCULAR: +chest pain or palpitations; No lower extremity edema  GASTROINTESTINAL: No abdominal or epigastric pain. No nausea, vomiting, or hematemesis; No diarrhea or constipation. No melena or hematochezia.  BACK: No back pain  GENITOURINARY: No dysuria, frequency or hematuria  NEUROLOGICAL: No numbness or weakness  SKIN: No itching, burning, rashes, or lesions   All other review of systems is negative unless indicated above.    Physical Exam:  T(F): 98.2 (10-11), Max: 98.2 (10-11)  HR: 86 (10-11) (84 - 86)  BP: 130/73 (10-11) (130/73 - 146/94)  RR: 18 (10-11)  SpO2: 99% (10-11)    GENERAL: No acute distress, well-developed  HEAD:  Atraumatic, Normocephalic  ENT: EOMI, PERRLA, conjunctiva and sclera clear, Neck supple, No JVD, moist mucosa  CHEST/LUNG: Diffuse rales w/ no wheezing/rhonchi  BACK: No spinal tenderness  HEART: Regular rate and rhythm; II/VI LOU audible in R second intercostal space/radiation to carotids, no rubs, or gallops  ABDOMEN: Soft, Nontender, Nondistended; Bowel sounds present  EXTREMITIES:  No clubbing, cyanosis, or edema  PSYCH: Nl behavior, nl affect  NEUROLOGY: AAOx3, non-focal, cranial nerves intact  SKIN: Normal color, No rashes or lesions    ECG: NSR, normal axis, LVH, no new/acute ischemic changes     Echo 5/2017:  EF>70%, LVOT obstruction w/ LVH, possible "TRICE" (in Allscripts). Aortic calcification     Stress Testing:    Cath:    Interpretation of Telemetry:    Imaging:    Labs: Personally reviewed                        13.8   16.9  )-----------( 183      ( 11 Oct 2017 22:19 )             41.9     10-12    139  |  91<L>  |  125<H>  ----------------------------<  143<H>  3.6   |  16<L>  |  11.13<H>    Ca    10.7<H>      12 Oct 2017 00:25  Phos  4.3     10-11  Mg     3.2     10-11    TPro  7.5  /  Alb  3.7  /  TBili  0.3  /  DBili  x   /  AST  78<H>  /  ALT  34  /  AlkPhos  90  10-11    PT/INR - ( 11 Oct 2017 22:19 )   PT: 10.7 sec;   INR: 0.99 ratio         PTT - ( 11 Oct 2017 22:19 )  PTT:31.9 sec  CARDIAC MARKERS ( 12 Oct 2017 00:25 )  x     / 0.10 ng/mL / x     / x     / x      CARDIAC MARKERS ( 11 Oct 2017 22:19 )  x     / x     / 201 U/L / x     / x      CARDIAC MARKERS ( 11 Oct 2017 18:01 )  .304 ng/mL / x     / x     / x     / x        82 yo lady w/ hx of ESRD, LVOT obstruction/LVH (possible AS?) who presents for sx's c/w URI and atypical chest pain, likely MSK.  Elevated Trops likely due to fluid retention from missed HD treatment in the setting of ESRD.  Unlikely ACS.     plan   - admit to telemetry   - monitor Brandon; once flat (trop T); stop checking   - cont rest of meds  - supportive care for URI   - obtain TTE to evaluate Aortic valve area, LVOT     Gunnar Toro MD  [facility] fellow   [id number] Chief Complaint:  chest pain     HPI:    Carla Macias is an 84 yo F w/ hx of ESRD (on HD Tues/Thurs/Sat), HTN, CVA (~20 years ago; s/p PEG w/ left hemiparesis/dysphagia at baseline), LVOT obstruction (100 mmHg).  Presented to OSH w/ 1 week hx of sore throat/chest pain.  Patient has been reporting cough/sore throat for 1 week as well as chest pain, which is describes as achy throughout the chest, worsening by cough/movements.  She presented to the OSH w/ above sx's.  Initial trop I was ~ 0.3, and was transferred to our ED for further evaluation.  Pt makes no urine, and missed her Tuesday HD tx.  Otherwise no other concerning sx's.      PMH:   Hypotension  Murmur  Osteoporosis  Chronic renal failure  History of CVA (cerebrovascular accident)    PSH:   Status post insertion of dialysis catheter  S/P gastrostomy  Arteriovenous graft for hemodialysis in place, primary  S/P cataract extraction    MEDS:  midodrine 5 mg oral tablet: 3 tab(s) orally once a day on tuesday, thursday and saturdays (pre dialysis), Last Dose Taken:    midodrine 5 mg oral tablet: 1 tab(s) orally Monday, Wednesday, Friday and sunday ( non dialysis days, Last Dose Taken:    Darcy-Melody oral tablet: 1 tab(s) by gastrostomy tube once a day, Last Dose Taken:    simvastatin 40 mg oral tablet: 1 tab(s) by gastrostomy tube once a day (at bedtime), Last Dose Taken:    brimonidine 0.2% ophthalmic solution: 1 drop(s) to each affected eye 3 times a day, Last Dose Taken:    Cosopt 2.23%-0.68% ophthalmic solution: 1 drop(s) to each affected eye 2 times a day, Last Dose Taken:    Travatan Z 0.004% ophthalmic solution: 1 drop(s) to each affected eye once a day (in the evening), Last Dose Taken:    Pepcid AC 10 mg oral tablet: 1 tab(s) orally 2 times a day, As Needed, Last Dose Taken:    aspirin 81 mg oral tablet, chewable: 1 tab(s) orally once a day, As Needed, Last Dose Taken:    Renagel 800 mg oral tablet: 2 tab(s) orally 3 times a day, Last Dose Taken:    omeprazole 40 mg oral delayed release capsule: 1 cap(s) orally once a day, Last Dose Taken:      Allergies:  MSG causes dizziness (Other)  pcn (Hives)  penicillin (Hives)    FAMILY HISTORY:  Family history of breast cancer (Grandparent)  Family history of heart disease  History of hypertension    Social History:  , no tobacco use, rare alcohol    REVIEW OF SYSTEMS:  CONSTITUTIONAL: No weakness, fevers or chills  EYES/ENT: No visual changes;  No dysphagia  NECK: No pain or stiffness  RESPIRATORY: +cough, no wheezing, hemoptysis; +shortness of breath  CARDIOVASCULAR: +chest pain or palpitations; No lower extremity edema  GASTROINTESTINAL: No abdominal or epigastric pain. No nausea, vomiting, or hematemesis; No diarrhea or constipation. No melena or hematochezia.  BACK: No back pain  GENITOURINARY: No dysuria, frequency or hematuria  NEUROLOGICAL: No numbness or weakness  SKIN: No itching, burning, rashes, or lesions   All other review of systems is negative unless indicated above.    Physical Exam:  T(F): 98.2 (10-11), Max: 98.2 (10-11)  HR: 86 (10-11) (84 - 86)  BP: 130/73 (10-11) (130/73 - 146/94)  RR: 18 (10-11)  SpO2: 99% (10-11)    GENERAL: No acute distress, well-developed  HEAD:  Atraumatic, Normocephalic  ENT: EOMI, PERRLA, conjunctiva and sclera clear, Neck supple, No JVD, moist mucosa  CHEST/LUNG: Diffuse rales w/ no wheezing/rhonchi  BACK: No spinal tenderness  HEART: Regular rate and rhythm; II/VI LOU audible in R second intercostal space/radiation to carotids, no rubs, or gallops  ABDOMEN: Soft, Nontender, Nondistended; Bowel sounds present  EXTREMITIES:  No clubbing, cyanosis, or edema  PSYCH: Nl behavior, nl affect  NEUROLOGY: AAOx3, non-focal, cranial nerves intact  SKIN: Normal color, No rashes or lesions    ECG: NSR, normal axis, LVH, no new/acute ischemic changes     Echo 5/2017:  EF>70%, LVOT obstruction w/ LVH, possible "TRICE" (in Allscripts). Aortic calcification     LABS:                          13.8   16.9  )-----------( 183      ( 11 Oct 2017 22:19 )             41.9     10-12    139  |  91<L>  |  125<H>  ----------------------------<  143<H>  3.6   |  16<L>  |  11.13<H>    Ca    10.7<H>      12 Oct 2017 00:25  Phos  4.3     10-11  Mg     3.2     10-11    TPro  7.5  /  Alb  3.7  /  TBili  0.3  /  DBili  x   /  AST  78<H>  /  ALT  34  /  AlkPhos  90  10-11    PT/INR - ( 11 Oct 2017 22:19 )   PT: 10.7 sec;   INR: 0.99 ratio    PTT - ( 11 Oct 2017 22:19 )  PTT:31.9 sec    CARDIAC MARKERS ( 12 Oct 2017 00:25 ) trop 0.10 ng/mL       CARDIAC MARKERS ( 11 Oct 2017 22:19 )   U/L   CARDIAC MARKERS ( 11 Oct 2017 18:01 )  trop I 0.304 ng/mL         84 yo lady w/ hx of ESRD, LVOT obstruction/LVH (possible AS?)   Presents for sx's c/w URI and atypical chest pain, likely MSK.    Elevated Trops likely due to fluid retention from missed HD treatment in the setting of ESRD.  Unlikely ACS.     REC:    - admit to telemetry   - monitor Brandon; once flat (trop T); stop checking   - cont rest of meds  - supportive care for URI   - obtain TTE to re-evaluate Aortic valve area, LVOT     Gunnar Toro MD  [facility] cards fellow   [id number]      Patient seen and examined; discussed with cardiology fellow, Dr. Toro. Hx., exam and labs as above.  I agree with the assessment and recommendations.   Joel Whitten M.D.  Cardiology Consult Service  [phone] or [phone]

## 2017-10-12 NOTE — H&P ADULT - NSHPPHYSICALEXAM_GEN_ALL_CORE
ICU Vital Signs Last 24 Hrs  T(C): 36.8 (11 Oct 2017 19:48), Max: 36.8 (11 Oct 2017 19:48)  T(F): 98.2 (11 Oct 2017 19:48), Max: 98.2 (11 Oct 2017 19:48)  HR: 86 (11 Oct 2017 21:26) (84 - 86)  BP: 130/73 (11 Oct 2017 21:26) (130/73 - 146/94)  BP(mean): --  ABP: --  ABP(mean): --  RR: 18 (11 Oct 2017 21:26) (16 - 18)  SpO2: 99% (11 Oct 2017 21:26) (97% - 99%)      I&O's Summary      PHYSICAL EXAM:   GENERAL: Alert. Mild distress when coughing. Not obese. Well-developed. Not cachectic.  HEAD:  Atraumatic. Normocephalic.  EYES: EOMI. PERRLA. Normal conjunctiva/sclera.  ENT: Neck supple. No JVD. Moist oral mucosa. Significant secretions in mouth  LYMPH: Normal supraclavicular/cervical lymph nodes.   CARDIAC: RRR. S1. S2. No murmur. No rub. No distant heart sounds.  LUNG/CHEST: CTAB. BS equal bilaterally. No wheezes. No rales. No rhonchi.  ABDOMEN: Soft. No tenderness. No distension. No fluid wave. Normal bowel sounds.  BACK: No spinal tenderness. No flank tenderness.  VASCULAR: +2 b/l radial pulses. Palpable DP pulses.  EXTREMITIES:  No clubbing. No cyanosis. No edema. Moving all 4.  NEUROLOGY: A&Ox3. Non-focal exam. Cranial nerves intact.  PSYCH: Normal behavior. Normal affect.  SKIN: Normal color. No rashes. No lesions.  Vascular Access:

## 2017-10-12 NOTE — CONSULT NOTE ADULT - ASSESSMENT
83F c hx HLD, CVA c/b dysphagia s/p PEG, grade 1 DHF, LVOT obstruction, GERD, ESRD (T/Th/Sat via left IJ tunneled dialysis cath), left arm vascular stent, hypotension requiring midodrine, presented to [facility] for worsening cough for 10 days, transferred to [facility] for NSTEMI mgmt.

## 2017-10-12 NOTE — PROGRESS NOTE ADULT - SUBJECTIVE AND OBJECTIVE BOX
Patient is a 83y old  Female who presents with a chief complaint of chest pain, sore throat (12 Oct 2017 03:25)      SUBJECTIVE / OVERNIGHT EVENTS: Pt c/o sore throat and cough. Intermittent chest pain.     MEDICATIONS  (STANDING):  acetylcysteine 10% Inhalation 5 milliLiter(s) Inhalation two times a day  ALBUTerol/ipratropium for Nebulization 3 milliLiter(s) Nebulizer every 6 hours  aspirin  chewable 81 milliGRAM(s) Enteral Tube daily  azithromycin  IVPB      brimonidine 0.2% Ophthalmic Solution 1 Drop(s) Both EYES three times a day  docusate sodium Liquid 100 milliGRAM(s) Oral three times a day  dorzolamide 2%/timolol 0.5% Ophthalmic Solution 1 Drop(s) Both EYES daily  famotidine   Suspension 20 milliGRAM(s) Enteral Tube daily  heparin  Injectable 5000 Unit(s) SubCutaneous every 8 hours  latanoprost 0.005% Ophthalmic Solution 1 Drop(s) Both EYES at bedtime  midodrine 15 milliGRAM(s) Oral <User Schedule>  midodrine 5 milliGRAM(s) Oral <User Schedule>  pantoprazole    Tablet 40 milliGRAM(s) Oral before breakfast  sevelamer hydrochloride 1600 milliGRAM(s) Oral three times a day with meals  simvastatin 40 milliGRAM(s) Oral at bedtime    MEDICATIONS  (PRN):  benzocaine 15 mG/menthol 3.6 mG Lozenge 1 Lozenge Oral every 6 hours PRN Sore Throat  oxyCODONE    5 mG/acetaminophen 325 mG 1 Tablet(s) Oral every 4 hours PRN Moderate Pain (4 - 6)      Vital Signs Last 24 Hrs  T(C): 36.6 (12 Oct 2017 07:34), Max: 36.8 (11 Oct 2017 19:48)  T(F): 97.8 (12 Oct 2017 07:34), Max: 98.2 (11 Oct 2017 19:48)  HR: 74 (12 Oct 2017 07:34) (74 - 86)  BP: 106/81 (12 Oct 2017 07:34) (106/81 - 146/94)  BP(mean): --  RR: 18 (12 Oct 2017 07:34) (16 - 18)  SpO2: 100% (12 Oct 2017 07:34) (97% - 100%)  CAPILLARY BLOOD GLUCOSE        I&O's Summary      PHYSICAL EXAM:  GENERAL: Chronically ill appearing, distress d/t cough   HEAD:  Atraumatic, Normocephalic  EYES: EOMI, PERRLA, conjunctiva and sclera clear  NECK: Supple, No JVD  CHEST/LUNG: Clear to auscultation bilaterally; No wheeze  HEART: Regular rate and rhythm; No murmurs, rubs, or gallops  ABDOMEN: Soft, Nontender, Nondistended; Bowel sounds present. +PEG  EXTREMITIES:  2+ Peripheral Pulses, No clubbing, cyanosis, or edema  PSYCH: AAOx3  NEUROLOGY: Moves all extremities.   SKIN: No rashes or lesions    LABS:                        15.1   20.0  )-----------( 259      ( 12 Oct 2017 06:36 )             45.4     10-12    139  |  90<L>  |  135<H>  ----------------------------<  144<H>  3.2<L>   |  17<L>  |  11.52<H>    Ca    11.0<H>      12 Oct 2017 06:36  Phos  4.4     10-12  Mg     3.7     10-12    TPro  8.1  /  Alb  4.1  /  TBili  0.3  /  DBili  x   /  AST  27  /  ALT  26  /  AlkPhos  122<H>  10-12    PT/INR - ( 11 Oct 2017 22:19 )   PT: 10.7 sec;   INR: 0.99 ratio         PTT - ( 11 Oct 2017 22:19 )  PTT:31.9 sec  CARDIAC MARKERS ( 12 Oct 2017 06:36 )  x     / 0.10 ng/mL / 107 U/L / x     / 36.9 ng/mL  CARDIAC MARKERS ( 12 Oct 2017 00:25 )  x     / 0.10 ng/mL / x     / x     / x      CARDIAC MARKERS ( 11 Oct 2017 22:19 )  x     / x     / 201 U/L / x     / x      CARDIAC MARKERS ( 11 Oct 2017 18:01 )  .304 ng/mL / x     / x     / x     / x              RADIOLOGY & ADDITIONAL TESTS:    Imaging Personally Reviewed:  CXR reviewed: no acute disease     Consultant(s) Notes Reviewed:      Care Discussed with Consultants/Other Providers:

## 2017-10-12 NOTE — ED PROVIDER NOTE - ATTENDING CONTRIBUTION TO CARE
attending Olu: 83yF ESRD on HD (TThSat) missed last dialysis, CVA with dysphagia and peg transferred from [facility] for abnormal EKG with elevated troponin for cath consult. Presented to OSH with cough and throat pain. No active CP in ED. On exam, chronically ill appearing, awake and alert, S1S2 normal, lungs clear, MMM, abdomen soft/NT, peg in place. Will obtain repeat ekg, place on tele, labs including cardiac enzymes and electrolytes given missed dialysis, CXR, urgent cards consultation and admission.

## 2017-10-12 NOTE — CONSULT NOTE ADULT - PROBLEM SELECTOR RECOMMENDATION 2
hgb above goal  will trend hgb- if stays elevated then will need to discard blood during dialysis  High hgb associated with poor cardio outcomes

## 2017-10-12 NOTE — CONSULT NOTE ADULT - SUBJECTIVE AND OBJECTIVE BOX
[facility] Consult Note Nephrology - CONSULTATION NOTE    83F c hx HLD, CVA c/b dysphagia s/p PEG, grade 1 DHF, LVOT obstruction, GERD, ESRD (T/Th/Sat via left IJ tunneled dialysis cath), left arm vascular stent, hypotension requiring midodrine, presented to [place] for worsening cough for 10 days, transferred to [facility] for NSTEMI mgmt.    History obtained mostly from daughter, Palma Macias, as pt was having trouble speaking 2/2 coughing and mucous.   At baseline, pt unable to swallow secretions so she drools/spits out saliva. 10 days ago, reports having increasing productive cough with change in sputum color. This was managed as outpt at home. Pt reports missing her Tuesday HD session, last HD session was 5 days ago on saturday. She presented to [facility] with persistent cough and sputum. At [facility], found to have positive trops, transferred to [facility] for further management. Pt complaining of some chest pain on coughing only, and some abdominal pain. Denies fevers, chills, nausea, diarrhea. Reports one episode of vomiting.     VS: Tm 98.2, P 86, /94, R 16, 97% RA  In the ED, received reglan IV 10.    Pts last HD was on saturday- currently denies f/c//n/v/    PAST MEDICAL & SURGICAL HISTORY:  Hypotension  Murmur  Osteoporosis  Chronic renal failure: On hemodialysis T, Th, Sat  History of CVA (cerebrovascular accident): Left sided weakness, dysphagia, PEG in place  Status post insertion of dialysis catheter: 2017  S/P gastrostomy: PEG tube  Arteriovenous graft for hemodialysis in place, primary: 1991  non functioning  S/P cataract extraction: Bilateral    MSG causes dizziness (Other)  pcn (Hives)  penicillin (Hives)    Home Medications Reviewed  Hospital Medications:   MEDICATIONS  (STANDING):  acetylcysteine 10% Inhalation 5 milliLiter(s) Inhalation two times a day  ALBUTerol/ipratropium for Nebulization 3 milliLiter(s) Nebulizer every 6 hours  aspirin  chewable 81 milliGRAM(s) Enteral Tube daily  brimonidine 0.2% Ophthalmic Solution 1 Drop(s) Both EYES three times a day  docusate sodium Liquid 100 milliGRAM(s) Oral three times a day  dorzolamide 2%/timolol 0.5% Ophthalmic Solution 1 Drop(s) Both EYES daily  famotidine   Suspension 20 milliGRAM(s) Enteral Tube daily  heparin  Injectable 5000 Unit(s) SubCutaneous every 8 hours  latanoprost 0.005% Ophthalmic Solution 1 Drop(s) Both EYES at bedtime  midodrine 15 milliGRAM(s) Oral <User Schedule>  midodrine 5 milliGRAM(s) Oral <User Schedule>  pantoprazole    Tablet 40 milliGRAM(s) Oral before breakfast  sevelamer hydrochloride 1600 milliGRAM(s) Oral three times a day with meals  simvastatin 40 milliGRAM(s) Oral at bedtime    SOCIAL HISTORY:  Denies ETOh,Smoking,   FAMILY HISTORY:  Family history of breast cancer (Grandparent)  Family history of heart disease  History of hypertension    REVIEW OF SYSTEMS:  CONSTITUTIONAL: + weakness  EYES/ENT: No visual changes;  No vertigo or throat pain   NECK: No pain or stiffness  RESPIRATORY: +cough, no wheezing, hemoptysis; No shortness of breath  CARDIOVASCULAR: No chest pain or palpitations.  GASTROINTESTINAL: No abdominal or epigastric pain. No nausea, vomiting, or hematemesis; No diarrhea or constipation. No melena or hematochezia.  GENITOURINARY: No dysuria, frequency, foamy urine, urinary urgency, incontinence or hematuria  NEUROLOGICAL: No numbness or weakness  SKIN: No itching, burning, rashes, or lesions   VASCULAR: No bilateral lower extremity edema.   All other review of systems is negative unless indicated above.    VITALS:  T(F): 97.8 (10-12-17 @ 07:34), Max: 98.2 (10-11-17 @ 19:48)  HR: 74 (10-12-17 @ 07:34)  BP: 106/81 (10-12-17 @ 07:34)  RR: 18 (10-12-17 @ 07:34)  SpO2: 100% (10-12-17 @ 07:34)  Wt(kg): --    Height (cm): 162.56 (10-11 @ 15:28)  Weight (kg): 59 (10-11 @ 15:28)  BMI (kg/m2): 22.3 (10-11 @ 15:28)  BSA (m2): 1.63 (10-11 @ 15:28)  PHYSICAL EXAM:  Constitutional: NAD  HEENT: anicteric sclera, oropharynx clear, MMM  Neck: No JVD  Respiratory: b/l rhonchi  Cardiovascular: S1, S2, RRR  Gastrointestinal: BS+, soft, NT/ND; + PEG  Extremities: No cyanosis or clubbing. No peripheral edema  Neurological: A/O x 3, no focal deficits  Psychiatric: Normal mood, normal affect  : No CVA tenderness. No blanton.   Skin: No rashes  Vascular Access: Left IJ PC    LABS:  10-12    139  |  90<L>  |  135<H>  ----------------------------<  144<H>  3.2<L>   |  17<L>  |  11.52<H>    Ca    11.0<H>      12 Oct 2017 06:36  Phos  4.4     10-12  Mg     3.7     10-12    TPro  8.1  /  Alb  4.1  /  TBili  0.3  /  DBili      /  AST  27  /  ALT  26  /  AlkPhos  122<H>  10-12    Creatinine Trend: 11.52 <--, 11.13 <--, 9.74 <--, 10.80 <--                        15.1   20.0  )-----------( 259      ( 12 Oct 2017 06:36 )             45.4     Urine Studies:      RADIOLOGY & ADDITIONAL STUDIES:

## 2017-10-12 NOTE — H&P ADULT - NSHPREVIEWOFSYSTEMS_GEN_ALL_CORE
REVIEW OF SYSTEMS:  CONSTITUTIONAL: +weakness. No fevers. No chills. No rigors. No weight loss. Poor appetite.  EYES: No visual changes. No eye pain.  ENT: No hearing difficulty. No vertigo. No dysphagia. No Sinusitis/rhinorrhea.  NECK: No pain. No stiffness/rigidity.  CARDIAC: +chest pain. No palpitations.  RESPIRATORY: +cough. No SOB. No hemoptysis.  GASTROINTESTINAL: +abdominal pain. No nausea. +vomiting. No hematemesis. No diarrhea. No constipation. No melena. No hematochezia.  GENITOURINARY: No dysuria. No frequency. No hesitancy. No hematuria.  NEUROLOGICAL: No numbness/tingling. No focal weakness. No incontinence. No headache.  BACK: No back pain.  EXTREMITIES: No lower extremity edema. Full ROM.  SKIN: No rashes. No itching. No other lesions.  PSYCHIATRIC: No depression. No anxiety. No SI/HI.  ALLERGIC: No lip swelling. No hives.  All other review of systems is negative unless indicated above.  Unless indicated above, unable to assess ROS because

## 2017-10-12 NOTE — CONSULT NOTE ADULT - ASSESSMENT
82 y/o female with productive cough x 10 days. Pt examined and scoped for possible epiglottitis. No evidence of epiglottitis or any other abnormality on flexible scope. Airway widely patent. Normal scope

## 2017-10-12 NOTE — ED ADULT NURSE REASSESSMENT NOTE - NS ED NURSE REASSESS COMMENT FT1
Pt was awaiting on RVP result from [facility] to go up to the room. Pt resting comfortably in stretcher w. family member at bedside. RVP negative, pt ready to be moved to [address] [place] [address], report given to ALON Degroot.

## 2017-10-12 NOTE — PROGRESS NOTE ADULT - ASSESSMENT
83F c hx HLD, CVA c/b dysphagia s/p PEG, grade 1 DHF, LVOT obstruction, GERD, ESRD (T/Th/Sat via left IJ tunneled dialysis cath), left arm vascular stent, hypotension requiring midodrine, transferred from OSH to [facility] for URI c/b NSTEMI vs trop leak.

## 2017-10-12 NOTE — CONSULT NOTE ADULT - ASSESSMENT
84 yo lady with PMH: esrd-HD, cva-peg-dysphagia,hypotension-midodrine, GERD, LVOT admitted for cough, sore throat, recent "cold", xray neck revealing mild epiglotitis, and pt having difficulty managing secretions 82 yo lady with PMH: esrd-HD, cva-peg-dysphagia,hypotension-midodrine, GERD, LVOT admitted for cough, sore throat, recent "cold", xray neck revealing mild epiglotitis, and pt having difficulty managing secretions, leukocytosis, epigastric pain, nstemi

## 2017-10-12 NOTE — H&P ADULT - NSHPLABSRESULTS_GEN_ALL_CORE
Personally reviewed labs.   Personally reviewed imaging.   Personally reviewed EKG. NSR, rate 84. . TWI in AVL/AVR.                          13.8   16.9  )-----------( 183      ( 11 Oct 2017 22:19 )             41.9       10-12    139  |  91<L>  |  125<H>  ----------------------------<  143<H>  3.6   |  16<L>  |  11.13<H>    Ca    10.7<H>      12 Oct 2017 00:25  Phos  4.3     10-11  Mg     3.2     10-11    TPro  7.5  /  Alb  3.7  /  TBili  0.3  /  DBili  x   /  AST  78<H>  /  ALT  34  /  AlkPhos  90  10-11      CARDIAC MARKERS ( 12 Oct 2017 00:25 )  x     / 0.10 ng/mL / x     / x     / x      CARDIAC MARKERS ( 11 Oct 2017 22:19 )  x     / x     / 201 U/L / x     / x      CARDIAC MARKERS ( 11 Oct 2017 18:01 )  .304 ng/mL / x     / x     / x     / x            LIVER FUNCTIONS - ( 11 Oct 2017 22:19 )  Alb: 3.7 g/dL / Pro: 7.5 g/dL / ALK PHOS: 90 U/L / ALT: 34 U/L RC / AST: 78 U/L / GGT: x             PT/INR - ( 11 Oct 2017 22:19 )   PT: 10.7 sec;   INR: 0.99 ratio         PTT - ( 11 Oct 2017 22:19 )  PTT:31.9 sec

## 2017-10-12 NOTE — H&P ADULT - PROBLEM SELECTOR PLAN 7
- pt is  and has 6 children, all of whom are surrogates and would make decisions for pt if pt unable to   - full code  - time spent 20 min - cont home midodrine dose

## 2017-10-12 NOTE — CHART NOTE - NSCHARTNOTEFT_GEN_A_CORE
Post RRT   83F c hx HLD, CVA c/b dysphagia s/p PEG, grade 1 DHF, LVOT obstruction, GERD, ESRD (T/Th/Sat via left IJ tunneled dialysis cath), left arm vascular stent, hypotension requiring midodrine, presented to [place] for worsening cough for 10 days, transferred to [facility] for NSTEMI mgmt.  s/p RRT at HD for hypotension     Pt currently back on unit from HD treatment per detailed RRT note   Stable at present denies c/p sob, h/a dizziness, persistent cough and drooling still present ENT consult at present pending recommendation     Vital Signs Last 24 Hrs  T(C): 36.5 (12 Oct 2017 16:04), Max: 36.8 (11 Oct 2017 19:48)  T(F): 97.7 (12 Oct 2017 16:04), Max: 98.2 (11 Oct 2017 19:48)  HR: 88 (12 Oct 2017 16:04) (72 - 88)  BP: 131/85 (12 Oct 2017 16:04) (106/81 - 146/94)  BP(mean): --  RR: 18 (12 Oct 2017 16:04) (16 - 18)  SpO2: 98% (12 Oct 2017 16:04) (97% - 100%)                        14.4   20.7  )-----------( 281      ( 12 Oct 2017 15:57 )             43.4     10-12    139  |  92<L>  |  106<H>  ----------------------------<  133<H>  3.0<L>   |  18<L>  |  9.04<H>    Ca    10.2      12 Oct 2017 15:58  Phos  3.5     10-12  Mg     3.3     10-12    TPro  8.1  /  Alb  4.1  /  TBili  0.3  /  DBili  x   /  AST  27  /  ALT  26  /  AlkPhos  122<H>  10-12    PT/INR - ( 11 Oct 2017 22:19 )   PT: 10.7 sec;   INR: 0.99 ratio         PTT - ( 11 Oct 2017 22:19 )  PTT:31.9 sec  Medicine [place] [id number]

## 2017-10-12 NOTE — ED ADULT NURSE REASSESSMENT NOTE - NS ED NURSE REASSESS COMMENT FT1
received report from [facility]. pt resting comfortably in stretcher. daughter at bedside. medication administered by night shift RN. report given to floor by night shift RN. awaiting transport. plan of care discussed. safety and comfort measures maintained.

## 2017-10-12 NOTE — H&P ADULT - ASSESSMENT
83F c hx HLD, CVA c/b dysphagia s/p PEG, GERD, ESRD (T/Th/Sat via left IJ tunneled dialysis cath), left arm vascular stent, hypotension requiring midodrine, p/w URI c/b NSTEMI vs trop leak. 83F c hx HLD, CVA c/b dysphagia s/p PEG, grade 1 DHF, LVOT obstruction, GERD, ESRD (T/Th/Sat via left IJ tunneled dialysis cath), left arm vascular stent, hypotension requiring midodrine, transferred from OSH to [facility] for URI c/b NSTEMI vs trop leak.

## 2017-10-12 NOTE — PROGRESS NOTE ADULT - PROBLEM SELECTOR PLAN 2
- seen by cards  - presumed type 2 NSTEMI in setting of URI   - trend CE  - tele  - TTE ordered   - cont ASA/statin   - hold off hep gtt

## 2017-10-12 NOTE — H&P ADULT - PROBLEM SELECTOR PLAN 5
- call renal for HD today  - no urgent/emergent need for HD - call renal for HD today  - no urgent/emergent need for HD  - does not appear volume overloaded

## 2017-10-12 NOTE — H&P ADULT - PROBLEM SELECTOR PLAN 8
- pt is  and has 6 children, all of whom are surrogates and would make decisions for pt if pt unable to   - full code  - time spent 20 min

## 2017-10-12 NOTE — CONSULT NOTE ADULT - SUBJECTIVE AND OBJECTIVE BOX
PULMONARY CONSULT    Initial HPI on admission:  HPI:  83F c hx HLD, CVA c/b dysphagia s/p PEG, grade 1 DHF, LVOT obstruction, GERD, ESRD (T/Th/Sat via left IJ tunneled dialysis cath), left arm vascular stent, hypotension requiring midodrine, presented to [place] for worsening cough for 10 days, transferred to [facility] for NSTEMI mgmt.   pt had a cold developed  trouble speaking 2/2 coughing and mucous. -fever at home  At baseline, pt unable to swallow secretions so she drools/spits out saliva. 10 days ago, reports having increasing productive cough with change in sputum color-reported clear with brown pieces. This was managed as outpt at home. Pt reports missing her Tuesday HD session, last HD session was 5 days ago on saturday. She presented to [place] hospital with persistent cough and sputum. At [place], found to have positive trops, transferred to [facility] for further management. Pt complaining of some chest pain on coughing only, and some abdominal pain. Called to park, pt seen in HD, s/p RRT in HD for hypotension, +cough with copious amounts of saliva, - fever, + abd pain with cough, -cp currently, -pleuritc cp, sob-goss, -n/v/d o/w ros neg              PAST MEDICAL & SURGICAL HISTORY:  Hypotension  Murmur  Osteoporosis  Chronic renal failure: On hemodialysis T, Th, Sat  History of CVA (cerebrovascular accident): Left sided weakness, dysphagia, PEG in place  Status post insertion of dialysis catheter: 2017  S/P gastrostomy: PEG tube  Arteriovenous graft for hemodialysis in place, primary: 1991  non functioning  S/P cataract extraction: Bilateral    Allergies    MSG causes dizziness (Other)  pcn (Hives)  penicillin (Hives)    Intolerances      FAMILY HISTORY:  Family history of breast cancer (Grandparent)  Family history of heart disease  History of hypertension    Social history: non smoker    Review of Systems:  CONSTITUTIONAL: No fever, chills, or fatigue  EYES: No eye pain, visual disturbances, or discharge  ENMT:  No difficulty hearing, tinnitus, vertigo; No sinus or throat pain  NECK: No pain or stiffness  RESPIRATORY: Per above  CARDIOVASCULAR: No chest pain, palpitations, dizziness, or leg swelling  GASTROINTESTINAL: No abdominal or epigastric pain. No nausea, vomiting, or hematemesis; No diarrhea or constipation. No melena or hematochezia.  GENITOURINARY: No dysuria, frequency, hematuria, or incontinence  NEUROLOGICAL: No headaches, memory loss, loss of strength, numbness, or tremors  SKIN: No itching, burning, rashes, or lesions   MUSCULOSKELETAL: No joint pain or swelling; No muscle, back, or extremity pain  PSYCHIATRIC: No depression, anxiety, mood swings, or difficulty sleeping      Medications:  MEDICATIONS  (STANDING):  acetylcysteine 10% Inhalation 5 milliLiter(s) Inhalation two times a day  ALBUTerol/ipratropium for Nebulization 3 milliLiter(s) Nebulizer every 6 hours  aspirin  chewable 81 milliGRAM(s) Enteral Tube daily  azithromycin  IVPB      azithromycin  IVPB 500 milliGRAM(s) IV Intermittent once  brimonidine 0.2% Ophthalmic Solution 1 Drop(s) Both EYES three times a day  docusate sodium Liquid 100 milliGRAM(s) Oral three times a day  dorzolamide 2%/timolol 0.5% Ophthalmic Solution 1 Drop(s) Both EYES daily  famotidine   Suspension 20 milliGRAM(s) Enteral Tube daily  heparin  Injectable 5000 Unit(s) SubCutaneous every 8 hours  latanoprost 0.005% Ophthalmic Solution 1 Drop(s) Both EYES at bedtime  midodrine 15 milliGRAM(s) Oral <User Schedule>  midodrine 5 milliGRAM(s) Oral <User Schedule>  pantoprazole    Tablet 40 milliGRAM(s) Oral before breakfast  sevelamer hydrochloride 1600 milliGRAM(s) Oral three times a day with meals  simvastatin 40 milliGRAM(s) Oral at bedtime    MEDICATIONS  (PRN):  benzocaine 15 mG/menthol 3.6 mG Lozenge 1 Lozenge Oral every 6 hours PRN Sore Throat  oxyCODONE    5 mG/acetaminophen 325 mG 1 Tablet(s) Oral every 4 hours PRN Moderate Pain (4 - 6)            Vital Signs Last 24 Hrs  T(C): 36.4 (12 Oct 2017 09:00), Max: 36.8 (11 Oct 2017 19:48)  T(F): 97.6 (12 Oct 2017 09:00), Max: 98.2 (11 Oct 2017 19:48)  HR: 76 (12 Oct 2017 09:00) (74 - 86)  BP: 134/74 (12 Oct 2017 09:00) (106/81 - 146/94)  BP(mean): --  RR: 18 (12 Oct 2017 09:00) (16 - 18)  SpO2: 100% (12 Oct 2017 09:00) (97% - 100%)      VBG pH 7.25 10-12 @ 06:36  VBG pCO2 46 10-12 @ 06:36  VBG O2 sat 83 10-12 @ 06:36  VBG lactate 1.9 10-12 @ 06:36            LABS:                        15.1   20.0  )-----------( 259      ( 12 Oct 2017 06:36 )             45.4     10-12    139  |  90<L>  |  135<H>  ----------------------------<  144<H>  3.2<L>   |  17<L>  |  11.52<H>    Ca    11.0<H>      12 Oct 2017 06:36  Phos  4.4     10-12  Mg     3.7     10-12    TPro  8.1  /  Alb  4.1  /  TBili  0.3  /  DBili  x   /  AST  27  /  ALT  26  /  AlkPhos  122<H>  10-12      CARDIAC MARKERS ( 12 Oct 2017 06:36 )  x     / 0.10 ng/mL / 107 U/L / x     / 36.9 ng/mL  CARDIAC MARKERS ( 12 Oct 2017 00:25 )  x     / 0.10 ng/mL / x     / x     / x      CARDIAC MARKERS ( 11 Oct 2017 22:19 )  x     / x     / 201 U/L / x     / x      CARDIAC MARKERS ( 11 Oct 2017 18:01 )  .304 ng/mL / x     / x     / x     / x          CAPILLARY BLOOD GLUCOSE      POCT Blood Glucose.: 134 mg/dL (12 Oct 2017 13:51)    PT/INR - ( 11 Oct 2017 22:19 )   PT: 10.7 sec;   INR: 0.99 ratio         PTT - ( 11 Oct 2017 22:19 )  PTT:31.9 sec                  CULTURES: (if applicable)        Physical Examination:  a/o x3 02 sat 95% on 2l nc, + cough up copious amts of clear saliva  General: No acute distress.      HEENT: Pupils equal, reactive to light.  Symmetric.-no stridor    PULM: decreased bs bilaterally, no significant sputum production    CVS: S1, S2    ABD: Soft, nondistended, nontender, normoactive bowel sounds, no masses, + epigastric pain, + guarding, -rebound    EXT: No edema, nontender    SKIN: Warm and well perfused, no rashes noted.    NEURO: Alert, oriented, interactive, nonfocal    RADIOLOGY REVIEWED  CXR:< from: Xray Chest 1 View AP- PORTABLE-Urgent (10.11.17 @ 22:09) >  FINDINGS:     There are bilateral upper extremity vascular stents. A left-sided   dialysis catheter terminates with the tip in the right atrium, unchanged   from 3/4/2017.  The lungs are clear.  There is no pleural effusion or pneumothorax.  The heart size is normal.  There is no acute osseous abnormality.    IMPRESSION:    No acute disease.    < from: Xray Neck Soft Tissue (10.11.17 @ 16:23) >  IMPRESSION:     Mild fullness of the epiglottis on the frontal view without   hyperinflation of the pharynx. This may represent partial volume   averaging. CTimaging of the neck may be considered for further   assessment.        < end of copied text >        CT chest:    TTE:< from: Transthoracic Echocardiogram (02.10.17 @ 12:55) >  effusion.  ------------------------------------------------------------------------  CONCLUSIONS:  1. Mitral annular calcification.  Systolic anterior motion  of the mitral valve. Mild mitral regurgitation.  2. Calcified trileaflet aortic valve with decreased  opening.  In some views, the valve grossly appears  significantly stenotic.  However, due to the presence of  left ventricular outflow tract (LVOT) obstruction, unable  to accurately estimate the aortic valve area.  3. Normal left ventricular internal dimensions and wall  thicknesses.  4. Hyperdynamic left ventricle.  5. Mild diastolic dysfunction (Stage I).  6. Normal right ventricular size and function.  Consider REDD for further evaluation of the aortic valve, if  clinically indicated.    < end of copied text > PULMONARY CONSULT    Initial HPI on admission:  HPI:  83F c hx HLD, CVA c/b dysphagia s/p PEG, grade 1 DHF, LVOT obstruction, GERD, ESRD (T/Th/Sat via left IJ tunneled dialysis cath), left arm vascular stent, hypotension requiring midodrine, presented to [place] for worsening cough for 10 days, transferred to [facility] for NSTEMI mgmt.   pt had a cold developed  trouble speaking 2/2 coughing and mucous. -fever at home  At baseline, pt unable to swallow secretions so she drools/spits out saliva. 10 days ago, reports having increasing productive cough with change in sputum color-reported clear with brown pieces. This was managed as outpt at home. Pt reports missing her Tuesday HD session, last HD session was 5 days ago on saturday. She presented to [place] hospital with persistent cough and sputum. At [place], found to have positive trops, transferred to [facility] for further management. Pt complaining of some chest pain on coughing only, and some abdominal pain. Called to park, pt seen in HD, s/p RRT in HD for hypotension, +cough with copious amounts of saliva, - fever, + abd pain with cough, -cp currently, -pleuritc cp, sob-goss, -n/v/d o/w ros neg              PAST MEDICAL & SURGICAL HISTORY:  Hypotension  Murmur  Osteoporosis  Chronic renal failure: On hemodialysis T, Th, Sat  History of CVA (cerebrovascular accident): Left sided weakness, dysphagia, PEG in place  Status post insertion of dialysis catheter: 2017  S/P gastrostomy: PEG tube  Arteriovenous graft for hemodialysis in place, primary: 1991  non functioning  S/P cataract extraction: Bilateral    Allergies    MSG causes dizziness (Other)  pcn (Hives)  penicillin (Hives)    Intolerances      FAMILY HISTORY:  Family history of breast cancer (Grandparent)  Family history of heart disease  History of hypertension    Social history: non smoker    Review of Systems:  CONSTITUTIONAL: No fever, chills, or fatigue  EYES: No eye pain, visual disturbances, or discharge  ENMT:  No difficulty hearing, tinnitus, vertigo; No sinus or positive throat pain  NECK: No pain or stiffness  RESPIRATORY: Per above  CARDIOVASCULAR: No chest pain, palpitations, dizziness, or leg swelling, + chest pain with cough  GASTROINTESTINAL: No abdominal or epigastric pain. No nausea, vomiting, or hematemesis; No diarrhea or constipation. No melena or hematochezia.+ epigastric pain  GENITOURINARY: No dysuria, frequency, hematuria, or incontinence  NEUROLOGICAL: No headaches, memory loss, loss of strength, numbness, or tremors  SKIN: No itching, burning, rashes, or lesions   MUSCULOSKELETAL: No joint pain or swelling; No muscle, back, or extremity pain  PSYCHIATRIC: No depression, anxiety, mood swings, or difficulty sleeping      Medications:  MEDICATIONS  (STANDING):  acetylcysteine 10% Inhalation 5 milliLiter(s) Inhalation two times a day  ALBUTerol/ipratropium for Nebulization 3 milliLiter(s) Nebulizer every 6 hours  aspirin  chewable 81 milliGRAM(s) Enteral Tube daily  azithromycin  IVPB      azithromycin  IVPB 500 milliGRAM(s) IV Intermittent once  brimonidine 0.2% Ophthalmic Solution 1 Drop(s) Both EYES three times a day  docusate sodium Liquid 100 milliGRAM(s) Oral three times a day  dorzolamide 2%/timolol 0.5% Ophthalmic Solution 1 Drop(s) Both EYES daily  famotidine   Suspension 20 milliGRAM(s) Enteral Tube daily  heparin  Injectable 5000 Unit(s) SubCutaneous every 8 hours  latanoprost 0.005% Ophthalmic Solution 1 Drop(s) Both EYES at bedtime  midodrine 15 milliGRAM(s) Oral <User Schedule>  midodrine 5 milliGRAM(s) Oral <User Schedule>  pantoprazole    Tablet 40 milliGRAM(s) Oral before breakfast  sevelamer hydrochloride 1600 milliGRAM(s) Oral three times a day with meals  simvastatin 40 milliGRAM(s) Oral at bedtime    MEDICATIONS  (PRN):  benzocaine 15 mG/menthol 3.6 mG Lozenge 1 Lozenge Oral every 6 hours PRN Sore Throat  oxyCODONE    5 mG/acetaminophen 325 mG 1 Tablet(s) Oral every 4 hours PRN Moderate Pain (4 - 6)            Vital Signs Last 24 Hrs  T(C): 36.4 (12 Oct 2017 09:00), Max: 36.8 (11 Oct 2017 19:48)  T(F): 97.6 (12 Oct 2017 09:00), Max: 98.2 (11 Oct 2017 19:48)  HR: 76 (12 Oct 2017 09:00) (74 - 86)  BP: 134/74 (12 Oct 2017 09:00) (106/81 - 146/94)  BP(mean): --  RR: 18 (12 Oct 2017 09:00) (16 - 18)  SpO2: 100% (12 Oct 2017 09:00) (97% - 100%)      VBG pH 7.25 10-12 @ 06:36  VBG pCO2 46 10-12 @ 06:36  VBG O2 sat 83 10-12 @ 06:36  VBG lactate 1.9 10-12 @ 06:36      Rapid Respiratory Viral Panel (10.12.17 @ 05:13)    Rapid RVP Result: [facility]: The [employer] RVP Rapid uses polymerase chain reaction (PCR) and melt  curve analysis to screen for adenovirus; coronavirus HKU1, NL63, 229E,  OC43; human metapneumovirus (hMPV); human enterovirus/rhinovirus  (Entero/RV); influenza A; influenza A/H1;influenza A/H3; influenza  A/H1-2009; influenza B; parainfluenza viruses 1, 2, 3, 4; respiratory  syncytial virus; Bordetella pertussis; Mycoplasma pneumoniae; and  Chlamydophila pneumoniae.          LABS:                        15.1   20.0  )-----------( 259      ( 12 Oct 2017 06:36 )             45.4     10-12    139  |  90<L>  |  135<H>  ----------------------------<  144<H>  3.2<L>   |  17<L>  |  11.52<H>    Ca    11.0<H>      12 Oct 2017 06:36  Phos  4.4     10-12  Mg     3.7     10-12    TPro  8.1  /  Alb  4.1  /  TBili  0.3  /  DBili  x   /  AST  27  /  ALT  26  /  AlkPhos  122<H>  10-12      CARDIAC MARKERS ( 12 Oct 2017 06:36 )  x     / 0.10 ng/mL / 107 U/L / x     / 36.9 ng/mL  CARDIAC MARKERS ( 12 Oct 2017 00:25 )  x     / 0.10 ng/mL / x     / x     / x      CARDIAC MARKERS ( 11 Oct 2017 22:19 )  x     / x     / 201 U/L / x     / x      CARDIAC MARKERS ( 11 Oct 2017 18:01 )  .304 ng/mL / x     / x     / x     / x          CAPILLARY BLOOD GLUCOSE      POCT Blood Glucose.: 134 mg/dL (12 Oct 2017 13:51)    PT/INR - ( 11 Oct 2017 22:19 )   PT: 10.7 sec;   INR: 0.99 ratio         PTT - ( 11 Oct 2017 22:19 )  PTT:31.9 sec                  CULTURES: pending        Physical Examination:  a/o x3 02 sat 95% on 2l nc, + cough up copious amts of clear saliva  General: No acute distress.      HEENT: Pupils equal, reactive to light.  Symmetric.-no stridor    PULM: decreased bs bilaterally, no significant sputum production    CVS: S1, S2    ABD: Soft, nondistended, nontender, normoactive bowel sounds, no masses, + epigastric pain, + guarding, -rebound    EXT: No edema, nontender    SKIN: Warm and well perfused, no rashes noted.    NEURO: Alert, oriented, interactive, nonfocal    RADIOLOGY REVIEWED  CXR:< from: Xray Chest 1 View AP- PORTABLE-Urgent (10.11.17 @ 22:09) >  FINDINGS:     There are bilateral upper extremity vascular stents. A left-sided   dialysis catheter terminates with the tip in the right atrium, unchanged   from 3/4/2017.  The lungs are clear.  There is no pleural effusion or pneumothorax.  The heart size is normal.  There is no acute osseous abnormality.    IMPRESSION:    No acute disease.    < from: Xray Neck Soft Tissue (10.11.17 @ 16:23) >  IMPRESSION:     Mild fullness of the epiglottis on the frontal view without   hyperinflation of the pharynx. This may represent partial volume   averaging. CTimaging of the neck may be considered for further   assessment.        < end of copied text >        TTE:< from: Transthoracic Echocardiogram (02.10.17 @ 12:55) >  effusion.  ------------------------------------------------------------------------  CONCLUSIONS:  1. Mitral annular calcification.  Systolic anterior motion  of the mitral valve. Mild mitral regurgitation.  2. Calcified trileaflet aortic valve with decreased  opening.  In some views, the valve grossly appears  significantly stenotic.  However, due to the presence of  left ventricular outflow tract (LVOT) obstruction, unable  to accurately estimate the aortic valve area.  3. Normal left ventricular internal dimensions and wall  thicknesses.  4. Hyperdynamic left ventricle.  5. Mild diastolic dysfunction (Stage I).  6. Normal right ventricular size and function.  Consider REDD for further evaluation of the aortic valve, if  clinically indicated.    < end of copied text >

## 2017-10-12 NOTE — CONSULT NOTE ADULT - PROBLEM SELECTOR RECOMMENDATION 2
possible aspiration pna  ct chest w/o conrtast eval lung parenchyma  dc mucomust possible aspiration pna  ct chest w/o conrtast eval lung parenchyma  dc mucomyst

## 2017-10-12 NOTE — H&P ADULT - HISTORY OF PRESENT ILLNESS
83F c hx HLD, CVA c/b dysphagia s/p PEG, GERD, ESRD (T/Th/Sat via left IJ tunneled dialysis cath), left arm vascular stent, hypotension requiring midodrine, p/w worsening cough for 10 days.    History obtained mostly from daughter, Palma Macias, as pt was having trouble speaking 2/2 coughing and mucous.   At baseline, pt unable to swallow secretions so she drools/spits out saliva. 10 days ago, reports having increasing productive cough with change in sputum color. This was managed as outpt at home. Pt reports missing her Tuesday HD session, last HD session was 5 days ago on saturday. She presented to [facility] with persistent cough and sputum. At [place], found to have positive trops, transferred to [facility] for further management. Pt complaining of some chest pain on coughing only, and some abdominal pain. Denies fevers, chills, nausea, diarrhea. Reports one episode of vomiting.     VS: Tm 98.2, P 86, /94, R 16, 97% RA  In the ED, received reglan IV 10.          No fevers/chills  No SOB  No N/V/D      ER Course:  CT with RUL opacity/vanc/cipro/aztreonam    Ms. Macias is an 84 yo lady w/ hx of ESRD (on HD Tues/Thurs/Sat), HTN, CVA (~20 years ago; s/p PEG w/ left hemiparesis/dysphagia at baseline), LVOT obstruction (100 mmHg) who presented to OSH w/ 1 week hx of sore throat/chest pain.  Patient has been endorsing cough/sore throat for 1 week as well as chest pain, which is describes as achy throughout the chest, worsening by cough/movements.  She presented to the OSH w/ above sx's.  Initial trop I was ~ 0.3, and was transferred to our ED for further evaluation.  Pt makes no urine, and missed her Tuesday HD tx.  Otherwise no other concerning sx's. 83F c hx HLD, CVA c/b dysphagia s/p PEG, grade 1 DHF, LVOT obstruction, GERD, ESRD (T/Th/Sat via left IJ tunneled dialysis cath), left arm vascular stent, hypotension requiring midodrine, presented to [place] for worsening cough for 10 days, transferred to [facility] for NSTEMI mgmt.    History obtained mostly from daughter, Palma Macias, as pt was having trouble speaking 2/2 coughing and mucous.   At baseline, pt unable to swallow secretions so she drools/spits out saliva. 10 days ago, reports having increasing productive cough with change in sputum color. This was managed as outpt at home. Pt reports missing her Tuesday HD session, last HD session was 5 days ago on saturday. She presented to [place] hospital with persistent cough and sputum. At [place], found to have positive trops, transferred to [facility] for further management. Pt complaining of some chest pain on coughing only, and some abdominal pain. Denies fevers, chills, nausea, diarrhea. Reports one episode of vomiting.     VS: Tm 98.2, P 86, /94, R 16, 97% RA  In the ED, received reglan IV 10.

## 2017-10-12 NOTE — CHART NOTE - NSCHARTNOTEFT_GEN_A_CORE
Rapid response called during dialysis for unresponsiveness and hypotension. Patient is an 83 year-old F with PMH HLD, CVA c/b dysphagia s/p PEG, grade 1 DHF, LVOT obstruction, GERD, ESRD (T/Th/Sat via left IJ tunneled dialysis cath), left arm vascular stent, and hypotension requiring midodrine who presented to the hospital for URI and possible NSTEMI. The rapid was called On arrival, patient was awake, alert, and following commands. Rapid response called during dialysis for unresponsiveness and hypotension. Patient is an 83 year-old F with PMH HLD, CVA c/b dysphagia s/p PEG, grade 1 DHF, LVOT obstruction, GERD, ESRD (T/Th/Sat via left IJ tunneled dialysis cath), left arm vascular stent, and hypotension requiring midodrine who presented to the hospital for URI and possible NSTEMI. Patient had been receiving HD for approximately 20 minutes when she became unresponsive and reportedly had an SBP in the 30s. Fluid was given back to the patient and the RRT was called. On arrival, patient was awake, alert, and following commands. Patient was following commands and had a BP of 104/60s, which is reportedly her baseline. She only complained of being hungry. FS was checked which was in the 130s. An EKG was obtained which showed no acute changes, sinus rhythm. A full set of labs, including CBC, BMP, and cardiac enzymes were drawn. The patient's nephrologist was contacted and recommended administration of midodrine with continued HD without fluid removal. Case discussed with 4 Redd OWUSU.    [place], PGY-2  Medicine Day Admit

## 2017-10-12 NOTE — H&P ADULT - NSHPSOCIALHISTORY_GEN_ALL_CORE
Social History:    Marital Status: (  ) , (  ) Single, (  ) , ( x ) , (  )   # of Children: 6  Lives with: (  ) alone, ( x ) children, (  ) spouse, (  ) parents, (  ) other:   Occupation:     Substance Use/Illicit Drugs: (  ) never used, (  ) other:   Tobacco Usage: ( x ) never smoked, (  ) former smoker, (  ) current smoker, and Total Pack-Years:   Last Alcohol Usage/Frequency/Amount:

## 2017-10-12 NOTE — H&P ADULT - PROBLEM SELECTOR PLAN 1
- RVP pending  - CXR grossly clear  - observe off abx for now  - check blood gas  - Duonebs + mucomyst + chest PT  - consider aspiration in ddx

## 2017-10-13 DIAGNOSIS — I95.9 HYPOTENSION, UNSPECIFIED: ICD-10-CM

## 2017-10-13 LAB
ANION GAP SERPL CALC-SCNC: 23 MMOL/L — HIGH (ref 5–17)
ANION GAP SERPL CALC-SCNC: 23 MMOL/L — HIGH (ref 5–17)
BASOPHILS # BLD AUTO: 0 K/UL — SIGNIFICANT CHANGE UP (ref 0–0.2)
BASOPHILS NFR BLD AUTO: 0.1 % — SIGNIFICANT CHANGE UP (ref 0–2)
BUN SERPL-MCNC: 78 MG/DL — HIGH (ref 7–23)
BUN SERPL-MCNC: 80 MG/DL — HIGH (ref 7–23)
CALCIUM SERPL-MCNC: 10 MG/DL — SIGNIFICANT CHANGE UP (ref 8.4–10.5)
CALCIUM SERPL-MCNC: 8.8 MG/DL — SIGNIFICANT CHANGE UP (ref 8.4–10.5)
CHLORIDE SERPL-SCNC: 102 MMOL/L — SIGNIFICANT CHANGE UP (ref 96–108)
CHLORIDE SERPL-SCNC: 105 MMOL/L — SIGNIFICANT CHANGE UP (ref 96–108)
CK SERPL-CCNC: 129 U/L — SIGNIFICANT CHANGE UP (ref 25–170)
CO2 SERPL-SCNC: 15 MMOL/L — LOW (ref 22–31)
CO2 SERPL-SCNC: 17 MMOL/L — LOW (ref 22–31)
CREAT SERPL-MCNC: 6.81 MG/DL — HIGH (ref 0.5–1.3)
CREAT SERPL-MCNC: 7.29 MG/DL — HIGH (ref 0.5–1.3)
EOSINOPHIL # BLD AUTO: 0 K/UL — SIGNIFICANT CHANGE UP (ref 0–0.5)
EOSINOPHIL NFR BLD AUTO: 0.1 % — SIGNIFICANT CHANGE UP (ref 0–6)
GLUCOSE SERPL-MCNC: 225 MG/DL — HIGH (ref 70–99)
GLUCOSE SERPL-MCNC: 91 MG/DL — SIGNIFICANT CHANGE UP (ref 70–99)
HCT VFR BLD CALC: 34.6 % — SIGNIFICANT CHANGE UP (ref 34.5–45)
HGB BLD-MCNC: 12.1 G/DL — SIGNIFICANT CHANGE UP (ref 11.5–15.5)
LYMPHOCYTES # BLD AUTO: 0.6 K/UL — LOW (ref 1–3.3)
LYMPHOCYTES # BLD AUTO: 3.8 % — LOW (ref 13–44)
MAGNESIUM SERPL-MCNC: 2.7 MG/DL — HIGH (ref 1.6–2.6)
MCHC RBC-ENTMCNC: 33.7 PG — SIGNIFICANT CHANGE UP (ref 27–34)
MCHC RBC-ENTMCNC: 34.9 GM/DL — SIGNIFICANT CHANGE UP (ref 32–36)
MCV RBC AUTO: 96.7 FL — SIGNIFICANT CHANGE UP (ref 80–100)
MONOCYTES # BLD AUTO: 0.7 K/UL — SIGNIFICANT CHANGE UP (ref 0–0.9)
MONOCYTES NFR BLD AUTO: 4.3 % — SIGNIFICANT CHANGE UP (ref 2–14)
NEUTROPHILS # BLD AUTO: 15.6 K/UL — HIGH (ref 1.8–7.4)
NEUTROPHILS NFR BLD AUTO: 91.8 % — HIGH (ref 43–77)
PHOSPHATE SERPL-MCNC: 3.3 MG/DL — SIGNIFICANT CHANGE UP (ref 2.5–4.5)
PLATELET # BLD AUTO: 186 K/UL — SIGNIFICANT CHANGE UP (ref 150–400)
POTASSIUM SERPL-MCNC: 2.2 MMOL/L — CRITICAL LOW (ref 3.5–5.3)
POTASSIUM SERPL-MCNC: 2.6 MMOL/L — CRITICAL LOW (ref 3.5–5.3)
POTASSIUM SERPL-SCNC: 2.2 MMOL/L — CRITICAL LOW (ref 3.5–5.3)
POTASSIUM SERPL-SCNC: 2.6 MMOL/L — CRITICAL LOW (ref 3.5–5.3)
PROCALCITONIN SERPL-MCNC: 0.53 NG/ML — HIGH (ref 0–0.04)
RBC # BLD: 3.58 M/UL — LOW (ref 3.8–5.2)
RBC # FLD: 16.6 % — HIGH (ref 10.3–14.5)
SODIUM SERPL-SCNC: 140 MMOL/L — SIGNIFICANT CHANGE UP (ref 135–145)
SODIUM SERPL-SCNC: 145 MMOL/L — SIGNIFICANT CHANGE UP (ref 135–145)
TROPONIN T SERPL-MCNC: 0.1 NG/ML — HIGH (ref 0–0.06)
WBC # BLD: 16.9 K/UL — HIGH (ref 3.8–10.5)
WBC # FLD AUTO: 16.9 K/UL — HIGH (ref 3.8–10.5)

## 2017-10-13 PROCEDURE — 71250 CT THORAX DX C-: CPT | Mod: 26

## 2017-10-13 PROCEDURE — 99233 SBSQ HOSP IP/OBS HIGH 50: CPT

## 2017-10-13 PROCEDURE — 99291 CRITICAL CARE FIRST HOUR: CPT

## 2017-10-13 PROCEDURE — 74176 CT ABD & PELVIS W/O CONTRAST: CPT | Mod: 26

## 2017-10-13 PROCEDURE — 93306 TTE W/DOPPLER COMPLETE: CPT | Mod: 26

## 2017-10-13 RX ORDER — MIDODRINE HYDROCHLORIDE 2.5 MG/1
20 TABLET ORAL ONCE
Qty: 0 | Refills: 0 | Status: COMPLETED | OUTPATIENT
Start: 2017-10-13 | End: 2017-10-13

## 2017-10-13 RX ORDER — SEVELAMER CARBONATE 2400 MG/1
1600 POWDER, FOR SUSPENSION ORAL
Qty: 0 | Refills: 0 | Status: DISCONTINUED | OUTPATIENT
Start: 2017-10-13 | End: 2017-10-13

## 2017-10-13 RX ORDER — POTASSIUM CHLORIDE 20 MEQ
10 PACKET (EA) ORAL
Qty: 0 | Refills: 0 | Status: DISCONTINUED | OUTPATIENT
Start: 2017-10-13 | End: 2017-10-13

## 2017-10-13 RX ORDER — MIDODRINE HYDROCHLORIDE 2.5 MG/1
10 TABLET ORAL THREE TIMES A DAY
Qty: 0 | Refills: 0 | Status: DISCONTINUED | OUTPATIENT
Start: 2017-10-13 | End: 2017-10-14

## 2017-10-13 RX ORDER — MIDODRINE HYDROCHLORIDE 2.5 MG/1
15 TABLET ORAL
Qty: 0 | Refills: 0 | Status: DISCONTINUED | OUTPATIENT
Start: 2017-10-13 | End: 2017-10-13

## 2017-10-13 RX ORDER — FAMOTIDINE 10 MG/ML
20 INJECTION INTRAVENOUS DAILY
Qty: 0 | Refills: 0 | Status: DISCONTINUED | OUTPATIENT
Start: 2017-10-13 | End: 2017-10-13

## 2017-10-13 RX ORDER — SEVELAMER CARBONATE 2400 MG/1
1600 POWDER, FOR SUSPENSION ORAL
Qty: 0 | Refills: 0 | Status: DISCONTINUED | OUTPATIENT
Start: 2017-10-13 | End: 2017-10-14

## 2017-10-13 RX ORDER — VANCOMYCIN HCL 1 G
1000 VIAL (EA) INTRAVENOUS ONCE
Qty: 0 | Refills: 0 | Status: COMPLETED | OUTPATIENT
Start: 2017-10-13 | End: 2017-10-13

## 2017-10-13 RX ORDER — AZTREONAM 2 G
VIAL (EA) INJECTION
Qty: 0 | Refills: 0 | Status: DISCONTINUED | OUTPATIENT
Start: 2017-10-13 | End: 2017-10-13

## 2017-10-13 RX ORDER — LATANOPROST 0.05 MG/ML
1 SOLUTION/ DROPS OPHTHALMIC; TOPICAL AT BEDTIME
Qty: 0 | Refills: 0 | Status: DISCONTINUED | OUTPATIENT
Start: 2017-10-13 | End: 2017-10-22

## 2017-10-13 RX ORDER — HEPARIN SODIUM 5000 [USP'U]/ML
5000 INJECTION INTRAVENOUS; SUBCUTANEOUS EVERY 8 HOURS
Qty: 0 | Refills: 0 | Status: DISCONTINUED | OUTPATIENT
Start: 2017-10-13 | End: 2017-10-22

## 2017-10-13 RX ORDER — DOCUSATE SODIUM 100 MG
100 CAPSULE ORAL THREE TIMES A DAY
Qty: 0 | Refills: 0 | Status: DISCONTINUED | OUTPATIENT
Start: 2017-10-13 | End: 2017-10-13

## 2017-10-13 RX ORDER — SIMVASTATIN 20 MG/1
40 TABLET, FILM COATED ORAL AT BEDTIME
Qty: 0 | Refills: 0 | Status: DISCONTINUED | OUTPATIENT
Start: 2017-10-13 | End: 2017-10-22

## 2017-10-13 RX ORDER — AZITHROMYCIN 500 MG/1
TABLET, FILM COATED ORAL
Qty: 0 | Refills: 0 | Status: DISCONTINUED | OUTPATIENT
Start: 2017-10-13 | End: 2017-10-13

## 2017-10-13 RX ORDER — AZITHROMYCIN 500 MG/1
500 TABLET, FILM COATED ORAL ONCE
Qty: 0 | Refills: 0 | Status: COMPLETED | OUTPATIENT
Start: 2017-10-13 | End: 2017-10-13

## 2017-10-13 RX ORDER — ACETAMINOPHEN 500 MG
650 TABLET ORAL EVERY 6 HOURS
Qty: 0 | Refills: 0 | Status: DISCONTINUED | OUTPATIENT
Start: 2017-10-13 | End: 2017-10-22

## 2017-10-13 RX ORDER — ACETYLCYSTEINE 200 MG/ML
5 VIAL (ML) MISCELLANEOUS
Qty: 0 | Refills: 0 | Status: DISCONTINUED | OUTPATIENT
Start: 2017-10-13 | End: 2017-10-14

## 2017-10-13 RX ORDER — PANTOPRAZOLE SODIUM 20 MG/1
40 TABLET, DELAYED RELEASE ORAL
Qty: 0 | Refills: 0 | Status: DISCONTINUED | OUTPATIENT
Start: 2017-10-13 | End: 2017-10-13

## 2017-10-13 RX ORDER — BENZOCAINE AND MENTHOL 5; 1 G/100ML; G/100ML
1 LIQUID ORAL EVERY 6 HOURS
Qty: 0 | Refills: 0 | Status: DISCONTINUED | OUTPATIENT
Start: 2017-10-13 | End: 2017-10-22

## 2017-10-13 RX ORDER — ASPIRIN/CALCIUM CARB/MAGNESIUM 324 MG
81 TABLET ORAL DAILY
Qty: 0 | Refills: 0 | Status: DISCONTINUED | OUTPATIENT
Start: 2017-10-13 | End: 2017-10-22

## 2017-10-13 RX ORDER — POTASSIUM CHLORIDE 20 MEQ
40 PACKET (EA) ORAL
Qty: 0 | Refills: 0 | Status: DISCONTINUED | OUTPATIENT
Start: 2017-10-13 | End: 2017-10-13

## 2017-10-13 RX ORDER — IPRATROPIUM/ALBUTEROL SULFATE 18-103MCG
3 AEROSOL WITH ADAPTER (GRAM) INHALATION EVERY 6 HOURS
Qty: 0 | Refills: 0 | Status: DISCONTINUED | OUTPATIENT
Start: 2017-10-13 | End: 2017-10-22

## 2017-10-13 RX ORDER — SODIUM CHLORIDE 9 MG/ML
250 INJECTION INTRAMUSCULAR; INTRAVENOUS; SUBCUTANEOUS ONCE
Qty: 0 | Refills: 0 | Status: COMPLETED | OUTPATIENT
Start: 2017-10-13 | End: 2017-10-13

## 2017-10-13 RX ORDER — AZTREONAM 2 G
500 VIAL (EA) INJECTION ONCE
Qty: 0 | Refills: 0 | Status: COMPLETED | OUTPATIENT
Start: 2017-10-13 | End: 2017-10-13

## 2017-10-13 RX ORDER — POTASSIUM CHLORIDE 20 MEQ
10 PACKET (EA) ORAL
Qty: 0 | Refills: 0 | Status: COMPLETED | OUTPATIENT
Start: 2017-10-13 | End: 2017-10-13

## 2017-10-13 RX ORDER — DORZOLAMIDE HYDROCHLORIDE TIMOLOL MALEATE 20; 5 MG/ML; MG/ML
1 SOLUTION/ DROPS OPHTHALMIC DAILY
Qty: 0 | Refills: 0 | Status: DISCONTINUED | OUTPATIENT
Start: 2017-10-13 | End: 2017-10-22

## 2017-10-13 RX ORDER — MIDODRINE HYDROCHLORIDE 2.5 MG/1
5 TABLET ORAL
Qty: 0 | Refills: 0 | Status: DISCONTINUED | OUTPATIENT
Start: 2017-10-13 | End: 2017-10-13

## 2017-10-13 RX ORDER — MIDODRINE HYDROCHLORIDE 2.5 MG/1
20 TABLET ORAL EVERY 8 HOURS
Qty: 0 | Refills: 0 | Status: DISCONTINUED | OUTPATIENT
Start: 2017-10-13 | End: 2017-10-13

## 2017-10-13 RX ORDER — INFLUENZA VIRUS VACCINE 15; 15; 15; 15 UG/.5ML; UG/.5ML; UG/.5ML; UG/.5ML
0.5 SUSPENSION INTRAMUSCULAR ONCE
Qty: 0 | Refills: 0 | Status: DISCONTINUED | OUTPATIENT
Start: 2017-10-13 | End: 2017-10-22

## 2017-10-13 RX ORDER — BRIMONIDINE TARTRATE 2 MG/MG
1 SOLUTION/ DROPS OPHTHALMIC THREE TIMES A DAY
Qty: 0 | Refills: 0 | Status: DISCONTINUED | OUTPATIENT
Start: 2017-10-13 | End: 2017-10-22

## 2017-10-13 RX ORDER — PHENYLEPHRINE HYDROCHLORIDE 10 MG/ML
1 INJECTION INTRAVENOUS
Qty: 80 | Refills: 0 | Status: DISCONTINUED | OUTPATIENT
Start: 2017-10-13 | End: 2017-10-14

## 2017-10-13 RX ORDER — PANTOPRAZOLE SODIUM 20 MG/1
40 TABLET, DELAYED RELEASE ORAL
Qty: 0 | Refills: 0 | Status: DISCONTINUED | OUTPATIENT
Start: 2017-10-13 | End: 2017-10-17

## 2017-10-13 RX ORDER — AZTREONAM 2 G
500 VIAL (EA) INJECTION EVERY 12 HOURS
Qty: 0 | Refills: 0 | Status: DISCONTINUED | OUTPATIENT
Start: 2017-10-13 | End: 2017-10-13

## 2017-10-13 RX ADMIN — MIDODRINE HYDROCHLORIDE 5 MILLIGRAM(S): 2.5 TABLET ORAL at 06:46

## 2017-10-13 RX ADMIN — HEPARIN SODIUM 5000 UNIT(S): 5000 INJECTION INTRAVENOUS; SUBCUTANEOUS at 05:07

## 2017-10-13 RX ADMIN — BRIMONIDINE TARTRATE 1 DROP(S): 2 SOLUTION/ DROPS OPHTHALMIC at 22:01

## 2017-10-13 RX ADMIN — HEPARIN SODIUM 5000 UNIT(S): 5000 INJECTION INTRAVENOUS; SUBCUTANEOUS at 22:01

## 2017-10-13 RX ADMIN — BRIMONIDINE TARTRATE 1 DROP(S): 2 SOLUTION/ DROPS OPHTHALMIC at 05:02

## 2017-10-13 RX ADMIN — MIDODRINE HYDROCHLORIDE 20 MILLIGRAM(S): 2.5 TABLET ORAL at 09:14

## 2017-10-13 RX ADMIN — SEVELAMER CARBONATE 800 MILLIGRAM(S): 2400 POWDER, FOR SUSPENSION ORAL at 12:12

## 2017-10-13 RX ADMIN — Medication 650 MILLIGRAM(S): at 12:11

## 2017-10-13 RX ADMIN — HEPARIN SODIUM 5000 UNIT(S): 5000 INJECTION INTRAVENOUS; SUBCUTANEOUS at 14:13

## 2017-10-13 RX ADMIN — Medication 5 MILLILITER(S): at 05:26

## 2017-10-13 RX ADMIN — LATANOPROST 1 DROP(S): 0.05 SOLUTION/ DROPS OPHTHALMIC; TOPICAL at 22:01

## 2017-10-13 RX ADMIN — AZITHROMYCIN 250 MILLIGRAM(S): 500 TABLET, FILM COATED ORAL at 01:09

## 2017-10-13 RX ADMIN — Medication 100 MILLIEQUIVALENT(S): at 05:01

## 2017-10-13 RX ADMIN — PHENYLEPHRINE HYDROCHLORIDE 22.12 MICROGRAM(S)/KG/MIN: 10 INJECTION INTRAVENOUS at 01:23

## 2017-10-13 RX ADMIN — BENZOCAINE AND MENTHOL 1 LOZENGE: 5; 1 LIQUID ORAL at 16:59

## 2017-10-13 RX ADMIN — SIMVASTATIN 40 MILLIGRAM(S): 20 TABLET, FILM COATED ORAL at 22:02

## 2017-10-13 RX ADMIN — Medication 81 MILLIGRAM(S): at 12:13

## 2017-10-13 RX ADMIN — BENZOCAINE AND MENTHOL 1 LOZENGE: 5; 1 LIQUID ORAL at 01:30

## 2017-10-13 RX ADMIN — SODIUM CHLORIDE 500 MILLILITER(S): 9 INJECTION INTRAMUSCULAR; INTRAVENOUS; SUBCUTANEOUS at 06:14

## 2017-10-13 RX ADMIN — MIDODRINE HYDROCHLORIDE 20 MILLIGRAM(S): 2.5 TABLET ORAL at 16:49

## 2017-10-13 RX ADMIN — MIDODRINE HYDROCHLORIDE 10 MILLIGRAM(S): 2.5 TABLET ORAL at 22:02

## 2017-10-13 RX ADMIN — Medication 100 MILLIEQUIVALENT(S): at 04:01

## 2017-10-13 RX ADMIN — Medication 40 MILLIEQUIVALENT(S): at 09:14

## 2017-10-13 RX ADMIN — PANTOPRAZOLE SODIUM 40 MILLIGRAM(S): 20 TABLET, DELAYED RELEASE ORAL at 12:13

## 2017-10-13 RX ADMIN — Medication 50 MILLIGRAM(S): at 02:41

## 2017-10-13 RX ADMIN — SODIUM CHLORIDE 500 MILLILITER(S): 9 INJECTION INTRAMUSCULAR; INTRAVENOUS; SUBCUTANEOUS at 02:45

## 2017-10-13 RX ADMIN — BRIMONIDINE TARTRATE 1 DROP(S): 2 SOLUTION/ DROPS OPHTHALMIC at 15:16

## 2017-10-13 RX ADMIN — SODIUM CHLORIDE 500 MILLILITER(S): 9 INJECTION INTRAMUSCULAR; INTRAVENOUS; SUBCUTANEOUS at 08:13

## 2017-10-13 RX ADMIN — SODIUM CHLORIDE 750 MILLILITER(S): 9 INJECTION INTRAMUSCULAR; INTRAVENOUS; SUBCUTANEOUS at 02:00

## 2017-10-13 RX ADMIN — DORZOLAMIDE HYDROCHLORIDE TIMOLOL MALEATE 1 DROP(S): 20; 5 SOLUTION/ DROPS OPHTHALMIC at 12:11

## 2017-10-13 RX ADMIN — SEVELAMER CARBONATE 1600 MILLIGRAM(S): 2400 POWDER, FOR SUSPENSION ORAL at 17:13

## 2017-10-13 RX ADMIN — Medication 3 MILLILITER(S): at 05:26

## 2017-10-13 RX ADMIN — Medication 1 TABLET(S): at 13:25

## 2017-10-13 RX ADMIN — Medication 650 MILLIGRAM(S): at 13:24

## 2017-10-13 NOTE — PROVIDER CONTACT NOTE (OTHER) - SITUATION
Pt became unresponsive in bathroom while PCA and RN attempted to assist pt off toilet. Pt lifted back in to bed and RRT called.

## 2017-10-13 NOTE — PROGRESS NOTE ADULT - SUBJECTIVE AND OBJECTIVE BOX
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 Presented to OSH w/ 1 week hx of sore throat/chest pain.  Patient has been reporting cough/sore throat for 1 week as well as chest pain, which is describes as achy throughout the chest, worsening by cough/movements.  She presented to the OSH w/ above sx's.  Initial trop I was ~ 0.3, and was transferred to our ED for further evaluation.  Pt makes no urine, and missed her Tuesday HD tx.  Otherwise no other concerning sx's.     Overnight, patient had a rapid response called twice for hypotension and syncope, eventually requiring pressors and MICU admission. Patient is currently off pressors and doing well.     PMH:   Hypotension  Murmur  Osteoporosis  Chronic renal failure  History of CVA (cerebrovascular accident)    PSH:   Status post insertion of dialysis catheter  S/P gastrostomy  Arteriovenous graft for hemodialysis in place, primary  S/P cataract extraction    MEDICATIONS  (STANDING):  acetylcysteine 10% Inhalation 5 milliLiter(s) Inhalation two times a day  aspirin  chewable 81 milliGRAM(s) Enteral Tube daily  brimonidine 0.2% Ophthalmic Solution 1 Drop(s) Both EYES three times a day  dorzolamide 2%/timolol 0.5% Ophthalmic Solution 1 Drop(s) Both EYES daily  heparin  Injectable 5000 Unit(s) SubCutaneous every 8 hours  influenza   Vaccine 0.5 milliLiter(s) IntraMuscular once  latanoprost 0.005% Ophthalmic Solution 1 Drop(s) Both EYES at bedtime  midodrine 15 milliGRAM(s) Oral <User Schedule>  midodrine 5 milliGRAM(s) Oral <User Schedule>  pantoprazole   Suspension 40 milliGRAM(s) Oral before breakfast  phenylephrine    Infusion 1 MICROgram(s)/kG/Min (22.125 mL/Hr) IV Continuous <Continuous>  potassium chloride   Solution 40 milliEquivalent(s) Oral every 2 hours  sevelamer carbonate Powder 1600 milliGRAM(s) Oral three times a day with meals  simvastatin 40 milliGRAM(s) Oral at bedtime    MEDICATIONS  (PRN):  ALBUTerol/ipratropium for Nebulization 3 milliLiter(s) Nebulizer every 6 hours PRN Shortness of Breath and/or Wheezing  benzocaine 15 mG/menthol 3.6 mG Lozenge 1 Lozenge Oral every 6 hours PRN Sore Throat        ICU Vital Signs Last 24 Hrs  T(C): 36.4 (13 Oct 2017 08:00), Max: 36.7 (12 Oct 2017 13:22)  T(F): 97.5 (13 Oct 2017 08:00), Max: 98.1 (12 Oct 2017 13:22)  HR: 69 (13 Oct 2017 08:00) (63 - 88)  BP: 66/43 (13 Oct 2017 08:00) (66/43 - 155/89)  BP(mean): 50 (13 Oct 2017 08:00) (50 - 95)  ABP: --  ABP(mean): --  RR: 18 (13 Oct 2017 08:00) (15 - 35)  SpO2: 97% (13 Oct 2017 08:00) (88% - 100%)    Physical:    GENERAL: No acute distress, well-developed  HEAD:  Atraumatic, Normocephalic  ENT: EOMI, PERRLA, conjunctiva and sclera clear, Neck supple, No JVD, moist mucosa  CHEST/LUNG: Diffuse rales w/ no wheezing/rhonchi  BACK: No spinal tenderness  HEART: Regular rate and rhythm; II/VI LOU audible in R second intercostal space/radiation to carotids, no rubs, or gallops  ABDOMEN: Soft, Nontender, Nondistended; Bowel sounds present  EXTREMITIES:  No clubbing, cyanosis, or edema  PSYCH: Nl behavior, nl affect  NEUROLOGY: AAOx3, non-focal, cranial nerves intact  SKIN: Normal color, No rashes or lesions    ECG: NSR, normal axis, LVH, no new/acute ischemic changes     Echo   5/2017:  EF>70%, LVOT obstruction w/ LVH, possible "TRICE" (in [employer]). Aortic calcification     10/12/17  Confirmed on  10/13/2017 - 10:20:28 by Austin Rodríguez M.D.  ------------------------------------------------------------------------      Conclusions:  1. Mitral annular calcification. There is systolic anterior  motion of the mitral valve. Moderate, eccentric,  posteriorly-directed mitral regurgitation.  2. Aortic valve not well visualized; appears to be a  calcified trileaflet valve with decreased opening. Measured  peak transaortic valve gradient equals 47 mm Hg. Unable to  assess degree of aortic stenosis due to the presence of  systolic anterior motion of the mitral valve and elevated  LVOT gradient. Visually, the aortic valve appears at least  moderately stenotic. Consider REDD to evaluate the aortic  valve if clinically indicated. No aortic valve  regurgitation seen.  3. Mild left ventricular hypertrophy with basal septal  bulge. Basal septal thickness=1.3 cm.  4. Hyperdynamic left ventricle. Peak left ventricular  outflow tract gradient equals 54 mm Hg, consistent with  moderately severe LVOT obstruction.  5. Mild diastolic dysfunction (Stage I).  6. Normal right ventricular size and function.  *** No previous Echo exam.  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 Presented to OSH w/ 1 week hx of sore throat/chest pain.  Patient has been reporting cough/sore throat for 1 week as well as chest pain, which is describes as achy throughout the chest, worsening by cough/movements.  She presented to the OSH w/ above sx's.  Initial trop I was ~ 0.3, and was transferred to our ED for further evaluation.  Pt makes no urine, and missed her Tuesday HD tx.  Otherwise no other concerning sx's.     Overnight, patient had a rapid response called twice for hypotension and syncope, eventually requiring pressors and MICU admission. Patient is currently off pressors (very brief requirement) and stable. She was given a few small fluid boluses and midodrine 20mg which seem to help keep her blood pressure adequate.    PMH:   Hypotension  Murmur  Osteoporosis  Chronic renal failure  History of CVA (cerebrovascular accident)    PSH:   Status post insertion of dialysis catheter  S/P gastrostomy  Arteriovenous graft for hemodialysis in place, primary  S/P cataract extraction    MEDICATIONS  (STANDING):  acetylcysteine 10% Inhalation 5 milliLiter(s) Inhalation two times a day  aspirin  chewable 81 milliGRAM(s) Enteral Tube daily  brimonidine 0.2% Ophthalmic Solution 1 Drop(s) Both EYES three times a day  dorzolamide 2%/timolol 0.5% Ophthalmic Solution 1 Drop(s) Both EYES daily  heparin  Injectable 5000 Unit(s) SubCutaneous every 8 hours  influenza   Vaccine 0.5 milliLiter(s) IntraMuscular once  latanoprost 0.005% Ophthalmic Solution 1 Drop(s) Both EYES at bedtime  midodrine 15 milliGRAM(s) Oral <User Schedule>  midodrine 5 milliGRAM(s) Oral <User Schedule>  pantoprazole   Suspension 40 milliGRAM(s) Oral before breakfast  phenylephrine    Infusion 1 MICROgram(s)/kG/Min (22.125 mL/Hr) IV Continuous <Continuous>  potassium chloride   Solution 40 milliEquivalent(s) Oral every 2 hours  sevelamer carbonate Powder 1600 milliGRAM(s) Oral three times a day with meals  simvastatin 40 milliGRAM(s) Oral at bedtime    MEDICATIONS  (PRN):  ALBUTerol/ipratropium for Nebulization 3 milliLiter(s) Nebulizer every 6 hours PRN Shortness of Breath and/or Wheezing  benzocaine 15 mG/menthol 3.6 mG Lozenge 1 Lozenge Oral every 6 hours PRN Sore Throat        ICU Vital Signs Last 24 Hrs  T(C): 36.4 (13 Oct 2017 08:00), Max: 36.7 (12 Oct 2017 13:22)  T(F): 97.5 (13 Oct 2017 08:00), Max: 98.1 (12 Oct 2017 13:22)  HR: 69 (13 Oct 2017 08:00) (63 - 88)  BP: 66/43 (13 Oct 2017 08:00) (66/43 - 155/89)  BP(mean): 50 (13 Oct 2017 08:00) (50 - 95)  ABP: --  ABP(mean): --  RR: 18 (13 Oct 2017 08:00) (15 - 35)  SpO2: 97% (13 Oct 2017 08:00) (88% - 100%)    Physical:    GENERAL: No acute distress, well-developed  HEAD:  Atraumatic, Normocephalic  ENT: EOMI, PERRLA, conjunctiva and sclera clear, Neck supple, No JVD, moist mucosa  CHEST/LUNG: Diffuse rales w/ no wheezing/rhonchi  BACK: No spinal tenderness  HEART: Regular rate and rhythm; II/VI LOU audible in R second intercostal space/radiation to carotids, no rubs, or gallops  ABDOMEN: Soft, Nontender, Nondistended; Bowel sounds present  EXTREMITIES:  No clubbing, cyanosis, or edema  PSYCH: Nl behavior, nl affect  NEUROLOGY: AAOx3, non-focal, cranial nerves intact  SKIN: Normal color, No rashes or lesions; dialysis site CDI    ECG: NSR, normal axis, LVH, no new/acute ischemic changes     Echo   5/2017:  EF>70%, LVOT obstruction w/ LVH, possible "TRICE" (in [employer]). Aortic calcification     10/12/17  Confirmed on  10/13/2017 - 10:20:28 by Austin Rodríguez M.D.  ------------------------------------------------------------------------      Conclusions:  1. Mitral annular calcification. There is systolic anterior  motion of the mitral valve. Moderate, eccentric,  posteriorly-directed mitral regurgitation.  2. Aortic valve not well visualized; appears to be a  calcified trileaflet valve with decreased opening. Measured  peak transaortic valve gradient equals 47 mm Hg. Unable to  assess degree of aortic stenosis due to the presence of  systolic anterior motion of the mitral valve and elevated  LVOT gradient. Visually, the aortic valve appears at least  moderately stenotic. Consider REDD to evaluate the aortic  valve if clinically indicated. No aortic valve  regurgitation seen.  3. Mild left ventricular hypertrophy with basal septal  bulge. Basal septal thickness=1.3 cm.  4. Hyperdynamic left ventricle. Peak left ventricular  outflow tract gradient equals 54 mm Hg, consistent with  moderately severe LVOT obstruction.  5. Mild diastolic dysfunction (Stage I).  6. Normal right ventricular size and function.  *** No previous Echo exam.  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 Presented to OSH w/ 1 week hx of sore throat/chest pain.  Patient has been reporting cough/sore throat for 1 week as well as chest pain, which is describes as achy throughout the chest, worsening by cough/movements.  She presented to the OSH w/ above sx's.  Initial trop I was ~ 0.3, and was transferred to our ED for further evaluation.  Pt makes no urine, and missed her Tuesday HD tx.  Otherwise no other concerning sx's.     Overnight, patient had a rapid response called twice for hypotension and syncope, eventually requiring pressors and MICU admission. Patient is currently off pressors (very brief requirement) and stable. She was given a few small fluid boluses and midodrine 20mg which seem to help keep her blood pressure adequate.    PMH:   Hypotension  Murmur  Osteoporosis  Chronic renal failure  History of CVA (cerebrovascular accident)    PSH:   Status post insertion of dialysis catheter  S/P gastrostomy  Arteriovenous graft for hemodialysis in place, primary  S/P cataract extraction    MEDICATIONS  (STANDING):  acetylcysteine 10% Inhalation 5 milliLiter(s) Inhalation two times a day  aspirin  chewable 81 milliGRAM(s) Enteral Tube daily  brimonidine 0.2% Ophthalmic Solution 1 Drop(s) Both EYES three times a day  dorzolamide 2%/timolol 0.5% Ophthalmic Solution 1 Drop(s) Both EYES daily  heparin  Injectable 5000 Unit(s) SubCutaneous every 8 hours  influenza   Vaccine 0.5 milliLiter(s) IntraMuscular once  latanoprost 0.005% Ophthalmic Solution 1 Drop(s) Both EYES at bedtime  midodrine 15 milliGRAM(s) Oral <User Schedule>  midodrine 5 milliGRAM(s) Oral <User Schedule>  pantoprazole   Suspension 40 milliGRAM(s) Oral before breakfast  phenylephrine    Infusion 1 MICROgram(s)/kG/Min (22.125 mL/Hr) IV Continuous <Continuous>  potassium chloride   Solution 40 milliEquivalent(s) Oral every 2 hours  sevelamer carbonate Powder 1600 milliGRAM(s) Oral three times a day with meals  simvastatin 40 milliGRAM(s) Oral at bedtime    MEDICATIONS  (PRN):  ALBUTerol/ipratropium for Nebulization 3 milliLiter(s) Nebulizer every 6 hours PRN Shortness of Breath and/or Wheezing  benzocaine 15 mG/menthol 3.6 mG Lozenge 1 Lozenge Oral every 6 hours PRN Sore Throat      ICU Vital Signs Last 24 Hrs  T(C): 36.4 (13 Oct 2017 08:00), Max: 36.7 (12 Oct 2017 13:22)  T(F): 97.5 (13 Oct 2017 08:00), Max: 98.1 (12 Oct 2017 13:22)  HR: 69 (13 Oct 2017 08:00) (63 - 88)  BP: 66/43 (13 Oct 2017 08:00) (66/43 - 155/89)  BP(mean): 50 (13 Oct 2017 08:00) (50 - 95)  RR: 18 (13 Oct 2017 08:00) (15 - 35)  SpO2: 97% (13 Oct 2017 08:00) (88% - 100%)    Physical:  GENERAL: No acute distress, well-developed  HEAD:  Atraumatic, Normocephalic  ENT: EOMI, PERRLA, conjunctiva and sclera clear, Neck supple, No JVD, moist mucosa  CHEST/LUNG: Diffuse rales w/ no wheezing/rhonchi  BACK: No spinal tenderness  HEART: Regular rate and rhythm; II/VI LOU audible in R second intercostal space/radiation to carotids, no rubs, or gallops  ABDOMEN: Soft, Nontender, Nondistended; Bowel sounds present  EXTREMITIES:  No clubbing, cyanosis, or edema  PSYCH: Nl behavior, nl affect  NEUROLOGY: AAOx3, non-focal, cranial nerves intact  SKIN: Normal color, No rashes or lesions; dialysis site CDI    ECG: NSR, normal axis, LVH, no new/acute ischemic changes     Echo   5/2017:  EF>70%, LVOT obstruction w/ LVH, possible "TRICE" (in [employer]). Aortic calcification     Echo  10/12/17  Confirmed on  10/13/2017 - 10:20:28 by Austin Rodríguez M.D.  ------------------------------------------------------------------------      Conclusions:  1. Mitral annular calcification. There is systolic anterior motion of the mitral valve. Moderate, eccentric, posteriorly-directed mitral regurgitation.  2. Aortic valve not well visualized; appears to be a calcified trileaflet valve with decreased opening. Measured peak transaortic valve gradient equals 47 mm Hg. Unable to assess degree of aortic stenosis due to the presence of systolic anterior motion of the mitral valve and elevated LVOT gradient. Visually, the aortic valve appears at least moderately stenotic. Consider REDD to evaluate the aortic valve if clinically indicated. No aortic valve regurgitation seen.  3. Mild left ventricular hypertrophy with basal septal bulge. Basal septal thickness=1.3 cm.   4. Hyperdynamic left ventricle. Peak left ventricular outflow tract gradient equals 54 mm Hg, consistent with moderately severe LVOT obstruction.  5. Mild diastolic dysfunction (Stage I).  6. Normal right ventricular size and function.  *** No previous Echo exam.

## 2017-10-13 NOTE — PROGRESS NOTE ADULT - SUBJECTIVE AND OBJECTIVE BOX
Follow-up Pulm Progress Note    s/p RRT x2-requiring admit to MICU for hypotension, + oral saliva    Medications:  MEDICATIONS  (STANDING):  acetylcysteine 10% Inhalation 5 milliLiter(s) Inhalation two times a day  aspirin  chewable 81 milliGRAM(s) Enteral Tube daily  brimonidine 0.2% Ophthalmic Solution 1 Drop(s) Both EYES three times a day  dorzolamide 2%/timolol 0.5% Ophthalmic Solution 1 Drop(s) Both EYES daily  heparin  Injectable 5000 Unit(s) SubCutaneous every 8 hours  influenza   Vaccine 0.5 milliLiter(s) IntraMuscular once  latanoprost 0.005% Ophthalmic Solution 1 Drop(s) Both EYES at bedtime  midodrine 15 milliGRAM(s) Oral <User Schedule>  midodrine 5 milliGRAM(s) Oral <User Schedule>  midodrine 20 milliGRAM(s) Oral once  pantoprazole   Suspension 40 milliGRAM(s) Oral before breakfast  phenylephrine    Infusion 1 MICROgram(s)/kG/Min (22.125 mL/Hr) IV Continuous <Continuous>  potassium chloride   Solution 40 milliEquivalent(s) Oral every 2 hours  sevelamer carbonate Powder 1600 milliGRAM(s) Oral three times a day with meals  simvastatin 40 milliGRAM(s) Oral at bedtime  sodium chloride 0.9% Bolus 250 milliLiter(s) IV Bolus once    MEDICATIONS  (PRN):  ALBUTerol/ipratropium for Nebulization 3 milliLiter(s) Nebulizer every 6 hours PRN Shortness of Breath and/or Wheezing  benzocaine 15 mG/menthol 3.6 mG Lozenge 1 Lozenge Oral every 6 hours PRN Sore Throat          Vital Signs Last 24 Hrs  T(C): 36.4 (13 Oct 2017 04:00), Max: 36.7 (12 Oct 2017 13:22)  T(F): 97.5 (13 Oct 2017 04:00), Max: 98.1 (12 Oct 2017 13:22)  HR: 69 (13 Oct 2017 08:00) (63 - 88)  BP: 66/43 (13 Oct 2017 08:00) (66/43 - 155/89)  BP(mean): 50 (13 Oct 2017 08:00) (50 - 95)  RR: 18 (13 Oct 2017 08:00) (15 - 35)  SpO2: 97% (13 Oct 2017 08:00) (88% - 100%)      VBG pH 7.23 10-12 @ 23:32    VBG pCO2 55 10-12 @ 23:32    VBG O2 sat 75 10-12 @ 23:32    VBG lactate 1.9 10-12 @ 23:32  VBG pH 7.21 10-12 @ 22:40    VBG pCO2 61 10-12 @ 22:40    VBG O2 sat 55 10-12 @ 22:40    VBG lactate 3.3 10-12 @ 22:40  VBG pH 7.25 10-12 @ 06:36    VBG pCO2 46 10-12 @ 06:36    VBG O2 sat 83 10-12 @ 06:36    VBG lactate 1.9 10-12 @ 06:36      10-12 @ 07:01  -  10-13 @ 07:00  --------------------------------------------------------  IN: 1454.4 mL / OUT: 0 mL / NET: 1454.4 mL          LABS:                        12.1   16.9  )-----------( 186      ( 13 Oct 2017 02:56 )             34.6     10-13    145  |  105  |  78<H>  ----------------------------<  91  2.6<LL>   |  17<L>  |  6.81<H>    Ca    8.8      13 Oct 2017 07:00  Phos  3.3     10-13  Mg     2.7     10-13    TPro  8.1  /  Alb  4.1  /  TBili  0.3  /  DBili  x   /  AST  27  /  ALT  26  /  AlkPhos  122<H>  10-12      CARDIAC MARKERS ( 13 Oct 2017 02:56 )  x     / 0.10 ng/mL / 129 U/L / x     / x      CARDIAC MARKERS ( 12 Oct 2017 22:45 )  x     / 0.11 ng/mL / 210 U/L / x     / 51.1 ng/mL  CARDIAC MARKERS ( 12 Oct 2017 15:58 )  x     / 0.10 ng/mL / 113 U/L / x     / 40.2 ng/mL  CARDIAC MARKERS ( 12 Oct 2017 14:40 )  x     / x     / 105 U/L / x     / x      CARDIAC MARKERS ( 12 Oct 2017 14:23 )  x     / 0.11 ng/mL / x     / x     / 38.6 ng/mL  CARDIAC MARKERS ( 12 Oct 2017 06:36 )  x     / 0.10 ng/mL / 107 U/L / x     / 36.9 ng/mL  CARDIAC MARKERS ( 12 Oct 2017 00:25 )  x     / 0.10 ng/mL / x     / x     / x      CARDIAC MARKERS ( 11 Oct 2017 22:19 )  x     / x     / 201 U/L / x     / x      CARDIAC MARKERS ( 11 Oct 2017 18:01 )  .304 ng/mL / x     / x     / x     / x          CAPILLARY BLOOD GLUCOSE      POCT Blood Glucose.: 89 mg/dL (12 Oct 2017 22:19)    PT/INR - ( 12 Oct 2017 22:45 )   PT: 10.7 sec;   INR: 0.98 ratio         PTT - ( 12 Oct 2017 22:45 )  PTT:45.3 sec    Procalcitonin, Serum: 0.53 ng/mL (10-13-17 @ 02:56)                  CULTURES: (if applicable)  Culture Results:   No growth to date. (10-12 @ 07:42)  Culture Results:   No growth to date. (10-12 @ 07:42)    Most recent blood culture -- 10-12 @ 07:42   -- -- .Blood Blood-Peripheral 10-12 @ 07:42        Physical Examination:  PULM: decreased bs bilaterally, no significant sputum production  CVS: S1, S2 heard  abd: epigastic discomfort to papation, +bs    RADIOLOGY REVIEWED  CXR: < from: Xray Chest 1 View AP- PORTABLE-Urgent (10.11.17 @ 22:09) >  FINDINGS:     There are bilateral upper extremity vascular stents. A left-sided   dialysis catheter terminates with the tip in the right atrium, unchanged   from 3/4/2017.  The lungs are clear.  There is no pleural effusion or pneumothorax.  The heart size is normal.  There is no acute osseous abnormality.    IMPRESSION:    No acute disease.    < end of copied text > Follow-up Pulm Progress Note    s/p RRT x2-requiring admit to MICU for hypotension, + oral saliva    Medications:  MEDICATIONS  (STANDING):  acetylcysteine 10% Inhalation 5 milliLiter(s) Inhalation two times a day  aspirin  chewable 81 milliGRAM(s) Enteral Tube daily  brimonidine 0.2% Ophthalmic Solution 1 Drop(s) Both EYES three times a day  dorzolamide 2%/timolol 0.5% Ophthalmic Solution 1 Drop(s) Both EYES daily  heparin  Injectable 5000 Unit(s) SubCutaneous every 8 hours  influenza   Vaccine 0.5 milliLiter(s) IntraMuscular once  latanoprost 0.005% Ophthalmic Solution 1 Drop(s) Both EYES at bedtime  midodrine 15 milliGRAM(s) Oral <User Schedule>  midodrine 5 milliGRAM(s) Oral <User Schedule>  midodrine 20 milliGRAM(s) Oral once  pantoprazole   Suspension 40 milliGRAM(s) Oral before breakfast  phenylephrine    Infusion 1 MICROgram(s)/kG/Min (22.125 mL/Hr) IV Continuous <Continuous>  potassium chloride   Solution 40 milliEquivalent(s) Oral every 2 hours  sevelamer carbonate Powder 1600 milliGRAM(s) Oral three times a day with meals  simvastatin 40 milliGRAM(s) Oral at bedtime  sodium chloride 0.9% Bolus 250 milliLiter(s) IV Bolus once    MEDICATIONS  (PRN):  ALBUTerol/ipratropium for Nebulization 3 milliLiter(s) Nebulizer every 6 hours PRN Shortness of Breath and/or Wheezing  benzocaine 15 mG/menthol 3.6 mG Lozenge 1 Lozenge Oral every 6 hours PRN Sore Throat          Vital Signs Last 24 Hrs  T(C): 36.4 (13 Oct 2017 04:00), Max: 36.7 (12 Oct 2017 13:22)  T(F): 97.5 (13 Oct 2017 04:00), Max: 98.1 (12 Oct 2017 13:22)  HR: 69 (13 Oct 2017 08:00) (63 - 88)  BP: 66/43 (13 Oct 2017 08:00) (66/43 - 155/89)  BP(mean): 50 (13 Oct 2017 08:00) (50 - 95)  RR: 18 (13 Oct 2017 08:00) (15 - 35)  SpO2: 97% (13 Oct 2017 08:00) (88% - 100%)      VBG pH 7.23 10-12 @ 23:32    VBG pCO2 55 10-12 @ 23:32    VBG O2 sat 75 10-12 @ 23:32    VBG lactate 1.9 10-12 @ 23:32  VBG pH 7.21 10-12 @ 22:40    VBG pCO2 61 10-12 @ 22:40    VBG O2 sat 55 10-12 @ 22:40    VBG lactate 3.3 10-12 @ 22:40  VBG pH 7.25 10-12 @ 06:36    VBG pCO2 46 10-12 @ 06:36    VBG O2 sat 83 10-12 @ 06:36    VBG lactate 1.9 10-12 @ 06:36      10-12 @ 07:01  -  10-13 @ 07:00  --------------------------------------------------------  IN: 1454.4 mL / OUT: 0 mL / NET: 1454.4 mL          LABS:                        12.1   16.9  )-----------( 186      ( 13 Oct 2017 02:56 )             34.6     10-13    145  |  105  |  78<H>  ----------------------------<  91  2.6<LL>   |  17<L>  |  6.81<H>    Ca    8.8      13 Oct 2017 07:00  Phos  3.3     10-13  Mg     2.7     10-13    TPro  8.1  /  Alb  4.1  /  TBili  0.3  /  DBili  x   /  AST  27  /  ALT  26  /  AlkPhos  122<H>  10-12      CARDIAC MARKERS ( 13 Oct 2017 02:56 )  x     / 0.10 ng/mL / 129 U/L / x     / x      CARDIAC MARKERS ( 12 Oct 2017 22:45 )  x     / 0.11 ng/mL / 210 U/L / x     / 51.1 ng/mL  CARDIAC MARKERS ( 12 Oct 2017 15:58 )  x     / 0.10 ng/mL / 113 U/L / x     / 40.2 ng/mL  CARDIAC MARKERS ( 12 Oct 2017 14:40 )  x     / x     / 105 U/L / x     / x      CARDIAC MARKERS ( 12 Oct 2017 14:23 )  x     / 0.11 ng/mL / x     / x     / 38.6 ng/mL  CARDIAC MARKERS ( 12 Oct 2017 06:36 )  x     / 0.10 ng/mL / 107 U/L / x     / 36.9 ng/mL  CARDIAC MARKERS ( 12 Oct 2017 00:25 )  x     / 0.10 ng/mL / x     / x     / x      CARDIAC MARKERS ( 11 Oct 2017 22:19 )  x     / x     / 201 U/L / x     / x      CARDIAC MARKERS ( 11 Oct 2017 18:01 )  .304 ng/mL / x     / x     / x     / x          CAPILLARY BLOOD GLUCOSE      POCT Blood Glucose.: 89 mg/dL (12 Oct 2017 22:19)    PT/INR - ( 12 Oct 2017 22:45 )   PT: 10.7 sec;   INR: 0.98 ratio         PTT - ( 12 Oct 2017 22:45 )  PTT:45.3 sec    Procalcitonin, Serum: 0.53 ng/mL (10-13-17 @ 02:56)    CULTURES: (if applicable)  Culture Results:   No growth to date. (10-12 @ 07:42)  Culture Results:   No growth to date. (10-12 @ 07:42)    Most recent blood culture -- 10-12 @ 07:42   -- -- .Blood Blood-Peripheral 10-12 @ 07:42        Physical Examination:  PULM: decreased bs bilaterally, no significant sputum production  CVS: S1, S2 heard  abd: epigastic discomfort to papation, +bs    RADIOLOGY REVIEWED  CXR: < from: Xray Chest 1 View AP- PORTABLE-Urgent (10.11.17 @ 22:09) >  FINDINGS:     There are bilateral upper extremity vascular stents. A left-sided   dialysis catheter terminates with the tip in the right atrium, unchanged   from 3/4/2017.  The lungs are clear.  There is no pleural effusion or pneumothorax.  The heart size is normal.  There is no acute osseous abnormality.    IMPRESSION:    No acute disease.    < end of copied text >

## 2017-10-13 NOTE — DIETITIAN INITIAL EVALUATION ADULT. - ADHERENCE
Pt reports having 3 cans of Nepro/day via PEG, also gets pureed food via tube, pt unable to provide detailed info about what kind of foods.

## 2017-10-13 NOTE — CHART NOTE - NSCHARTNOTEFT_GEN_A_CORE
Called bedside to RRT as pt syncopized while using the bathroom. Saw patient at bedside on [employer]    Initial vitals from RRT: BP:101/69, HR:73, RR:18, T:97.4, SpO2:95% on NC, FS:89. Repeat BP during RRT /47-71, during RRT pt received treatment for hyperK and NS x 750cc.     On review of the Tele strip prior to the syncope (22:16) the patient is in NSR. The P-P interval prolongs w/ a fixed AZ interval and bradycardia with the R-R interval of 2s and PVCs, I suspect vasovagal syncope given that the patient was using the bathroom when this occurred and based on the changes seen on Tele    The patient did not have fluid removed during HD today. The pt received Midodrine 15mg @ 6:42am. Labs reviewed: leukocytosis (N:91.8%), K:7.7 (hemolyzed), real K:2.6, HCO3+:19, A, B, BUN:84, lactate:3.3 – suspect AG acidosis 2/2 elevated lactate and BUN. Of note lactate improved on repeat (after IVF), BCx x 2 pending, CXR w/ out overt pathology. ECG w/ out ischemic changes.    Brandon showed CK:210, CKMB:51 and Trop:0.11 – trop stable (likely elevated 2/2 CKD), increased CK and CKMB likely 2/2 hypotension    83F w/ PMHx of ESRD (on HD T/Th/Sat), HTN, CVA (s/p PEG w/ left hemiparesis/dysphagia at baseline), LVOT obstruction who is being treated w/ ABXs for sepsis (unclear source of infection). The pt likely had a vasogagal syncope episode in the setting of hypovolemia 2/2 infection. The patient’s LVOT obstruction with be exacerbated by hypovolemia.     Agree w/ transfer to MICU, given LVOT obstruction would use Chapincito to support BP over Levo as want to avoid tachycardia. I would not treat for ACS as I do not suspect ACS at this time (pt pain free). The elevated biomarkers are likely due to infection and ESRD. Cardiology to continue to follow. Please obtain official TTE in am to assess gradients across LVOT.    For any questions overnight please call [phone].    Mike

## 2017-10-13 NOTE — PROGRESS NOTE ADULT - SUBJECTIVE AND OBJECTIVE BOX
Patient seen and examined  yesterday on HD pt with hypotension with syncope- RRT called- pt was rinsed back and regained consciousness with improvement bp  completed 1.5 hours of HD and was given 300 cc + fluid and midodrine 10mg;  later in the evening again with near syncope and hypotension guillermo cardia- sent to MICU on pressor support    MSG causes dizziness (Other)  pcn (Hives)  penicillin (Hives)    Hospital Medications:   MEDICATIONS  (STANDING):  acetylcysteine 10% Inhalation 5 milliLiter(s) Inhalation two times a day  aspirin  chewable 81 milliGRAM(s) Enteral Tube daily  brimonidine 0.2% Ophthalmic Solution 1 Drop(s) Both EYES three times a day  dorzolamide 2%/timolol 0.5% Ophthalmic Solution 1 Drop(s) Both EYES daily  heparin  Injectable 5000 Unit(s) SubCutaneous every 8 hours  influenza   Vaccine 0.5 milliLiter(s) IntraMuscular once  latanoprost 0.005% Ophthalmic Solution 1 Drop(s) Both EYES at bedtime  midodrine 15 milliGRAM(s) Oral <User Schedule>  midodrine 5 milliGRAM(s) Oral <User Schedule>  pantoprazole   Suspension 40 milliGRAM(s) Oral before breakfast  phenylephrine    Infusion 1 MICROgram(s)/kG/Min (22.125 mL/Hr) IV Continuous <Continuous>  potassium chloride   Solution 40 milliEquivalent(s) Oral every 2 hours  sevelamer carbonate Powder 1600 milliGRAM(s) Oral three times a day with meals  simvastatin 40 milliGRAM(s) Oral at bedtime      VITALS:  T(F): 97.5 (10-13-17 @ 08:00), Max: 98.1 (10-12-17 @ 13:22)  HR: 69 (10-13-17 @ 08:00)  BP: 66/43 (10-13-17 @ 08:00)  RR: 18 (10-13-17 @ 08:00)  SpO2: 97% (10-13-17 @ 08:00)  Wt(kg): --    10-12 @ 07:01  -  10-13 @ 07:00  --------------------------------------------------------  IN: 1454.4 mL / OUT: 0 mL / NET: 1454.4 mL    10-13 @ 07:01  -  10-13 @ 11:08  --------------------------------------------------------  IN: 250 mL / OUT: 0 mL / NET: 250 mL      Height (cm): 152 (10-12 @ 22:41)  Weight (kg): 59 (10-12 @ 22:41)  BMI (kg/m2): 25.5 (10-12 @ 22:41)  BSA (m2): 1.55 (10-12 @ 22:41)  PHYSICAL EXAM:  Constitutional: confused  HEENT: anicteric sclera, oropharynx clear, MMM  Neck: No JVD  Respiratory: b/l rhonchi  Cardiovascular: S1, S2, RRR  Gastrointestinal: BS+, soft, NT/ND  Extremities: No cyanosis or clubbing. No peripheral edema  Vascular Access: left PC    LABS:  10-13    145  |  105  |  78<H>  ----------------------------<  91  2.6<LL>   |  17<L>  |  6.81<H>    Ca    8.8      13 Oct 2017 07:00  Phos  3.3     10-13  Mg     2.7     10-13    TPro  8.1  /  Alb  4.1  /  TBili  0.3  /  DBili      /  AST  27  /  ALT  26  /  AlkPhos  122<H>  10-12    Creatinine Trend: 6.81 <--, 7.29 <--, 7.48 <--, 7.79 <--, 9.04 <--, 11.52 <--, 11.13 <--, 9.74 <--, 10.80 <--                        12.1   16.9  )-----------( 186      ( 13 Oct 2017 02:56 )             34.6     Urine Studies:      RADIOLOGY & ADDITIONAL STUDIES:

## 2017-10-13 NOTE — PROVIDER CONTACT NOTE (OTHER) - ASSESSMENT
Pt became unresponsive in bathroom while PCA and RN attempted to assist pt off toilet. Pt lifted back into bed and RRT called.

## 2017-10-13 NOTE — CHART NOTE - NSCHARTNOTEFT_GEN_A_CORE
RRT Note - MAR    RRT was called for syncope.  Patient was in the bathroom with assistance, having a bowel movement when she collapsed.  Was supported by staff, did not fall to the ground and was assisted back to the bed.  Upon arrival, patient was awake and alert.  Telemetry at time of syncopal event showed sinus bradycardia.  On cardiac monitor patient's HR ranged from 50-60s.  Per chart review and RN, patient had an RRT earlier in the day for hypotension/syncope during dialysis.  She was only able to tolerate about 30 minutes of dialysis.  Labs were checked - CBC, VBG, BMP, cardiac enzymes, coags.  Initial VBG showed K of 7.1 and potassium on BMP came back 7.7 with severe hemolysis.  She was treated for hyperkalemia with calcium chloride 2g, albuterol nebs and kayexelate was given via PEG.   Repeat VBG and BMP were repeated, which returned showing hypokalemia to 2.4.  Kayexelate given through PEG was removed.  During RRT patient also had episode of bradycardia down to 30-40s, asymptomatic, however may have had another small BM just prior.  Cardiology was consulted and came to bedside.  Stated that despite admission for NSTEMI, patient's normal CK and slightly elevated troponins were likely due to renal function rather than NSTEMI.  On repeat vitals, patient was found to be hypotensive ranging from 70-80s systolic on multiple readings.  She was given normal saline 250 cc boluses x4 total, however blood pressure remained in the 80s systolic.  Patient was started on pressors and MICU was consulted.    Given leukocytosis and concern for aspiration indicated by CT Chest ordered, patient was broadened to vanc/aztreonam.      Bradycardia could have been caused by daily midodrine with 15mg given on days of dialysis.  Patient also being treated for possible PNA and likely was under resuscitated with IVF given ESRD, likely causing hypotension with some component of bradycardia.  Initial hyperkalemia was thought to be due to potassium supplementation during dialysis which ultimately could not be completed, however repeat potassium was even lower, also potentially contributing to bradycardia.          Luann López MD  MAR - Night  spectra [phone]

## 2017-10-13 NOTE — DIETITIAN INITIAL EVALUATION ADULT. - NS AS NUTRI INTERV ENTERAL NUTRITION
Recommend Nepro 45 cc/hr x 18 hrs provides 1458 kcals, 66 gm protein, 589 cc free water for 32 kcals/kg, 1.4gm protein/kg reported dry wt of 45.5kg

## 2017-10-13 NOTE — PROGRESS NOTE ADULT - ASSESSMENT
84 yo lady with PMH: esrd-HD, cva-peg-dysphagia,hypotension-midodrine, GERD, LVOT admitted for cough, sore throat, recent "cold", xray neck revealing mild epiglotitis, and pt having difficulty managing secretions, leukocytosis, epigastric pain, nstemi

## 2017-10-13 NOTE — PHYSICAL THERAPY INITIAL EVALUATION ADULT - ADDITIONAL COMMENTS
Pt lives in private home with son and 2 daughters, Pt with 4 steps to enter with bilateral railings. pt amb with RW prior to admission. Pt required assist for ADLs provided by children

## 2017-10-13 NOTE — CHART NOTE - NSCHARTNOTEFT_GEN_A_CORE
82 yo F with pmhx HLD, CVA, dysphagia s/p PEG, dCHF, LVOT obstruction, GERD, ESRD on HD requiring midodrine, originally presented to [place] for worsening cough for 10 days likely URI, transferred to [facility] for NSTEMI.    Pt had RRT x 2 today. First rapid was for hypotension after HD, dialysis stopped, no fluid removed, SBP returned to 105s and pt transferred back to medical floors.    Pt now once again had rapid for hypotension 70/30s given total ~1 L fluids over 1.5 hours, on POCUS pt a-lined bilaterally and compressible IVC but given pt's renal status and minimal improvement, the pressor Chapincito was initiated given her LVOT obstruction and pt transferred to MICU. At the rapid, pt also had a falsely elevated K with (?) bradycardia. Albuterol and Kayexalate given but repeat VBG showed K of 2.4, so Kayexalate was removed via PEG.        ROS:  Constitutional: denies fevers, chills, night sweats, weight loss  HEENT: denies visual changes, hearing changes, rhinitis, odynophagia, or dysphagia  Cardiovascular: denies palpitations, chest pain, edema  Respiratory: denies SOB, wheezing  Gastrointestinal: denies N/V/D, abdominal pain, hematochezia, melena  : denies dysuria, hematuria  MSK: denies weakness, joint pain  Skin: denies new rashes or masses      PHYSICAL EXAM:  GENERAL: NAD, well-developed  HEAD: Atraumatic, Normocephalic  EYES: EOMI, PERRLA, conjunctiva and sclera clear  NECK: Supple, No JVD  CHEST/LUNG: Clear to auscultation bilaterally; No wheezes/rales/rhonchi  HEART: Regular rate and rhythm; No murmurs, rubs, or gallops  ABDOMEN: Soft, Nontender, Nondistended; Bowel sounds present. +PEG  EXTREMITIES:  2+ dP pulses b/l, No clubbing, cyanosis, or edema  PSYCH: reactive affect  NEUROLOGY: AAOx3, non-focal  SKIN: No rashes or lesions    10-12    140  |  99  |  81<H>  ----------------------------<  159<H>  2.6<LL>   |  18<L>  |  7.48<H>    Ca    11.4<H>      12 Oct 2017 23:28  Phos  3.5     10-12  Mg     3.3     10-12    TPro  8.1  /  Alb  4.1  /  TBili  0.3  /  DBili  x   /  AST  27  /  ALT  26  /  AlkPhos  122<H>  10-12                          14.4   19.4  )-----------( 229      ( 12 Oct 2017 22:45 )             43.7     LIVER FUNCTIONS - ( 12 Oct 2017 06:36 )  Alb: 4.1 g/dL / Pro: 8.1 g/dL / ALK PHOS: 122 U/L / ALT: 26 U/L RC / AST: 27 U/L / GGT: x           PT/INR - ( 12 Oct 2017 22:45 )   PT: 10.7 sec;   INR: 0.98 ratio         PTT - ( 12 Oct 2017 22:45 )  PTT:45.3 sec        A/P:  82 yo F with pmhx HLD, CVA, dysphagia s/p PEG, dCHF, LVOT obstruction, GERD, ESRD on HD requiring midodrine, p/w worsening cough for 10 days likely URI, transferred to [facility] for NSTEMI.    #Neuro  - pt at baseline, A&O x 3    #Resp  - at baseline, no O2 support needed  - pt broadened to azithro/aztreonam/vanc during rapid for URI /sepsis factors that may have contributed to HoTN  - duoneb    #Cardiac  - NSTEMI care as per cardiology ([place])  - c/w aspirin  - pt currently off Chapincito  - trend troponins until downtrending  - TTE pending for eval of LVOT obstruction  - will likely need standing daily midodrine if HR maintained without any further episodes of bradycardia  - tele monitoring for any ectopy/bradycardia  - c/w statin    #Renal  - will speak with Renal in am regarding need of HD  - c/w current midodrine  - BMP qday for K/Phos/Mg  - sevelemer    #GI  - PEG, c/w feeds  - dysphagia being evaluated by ENT, no clear source  - PPI, pepcid    # ID  - will c/w broad abx coverage for now, consider downgrading tomorrow if afebrile  - BCx pending 82 yo F with pmhx HLD, CVA, dysphagia s/p PEG, dCHF, LVOT obstruction, GERD, ESRD on HD requiring midodrine, originally presented to [place] for worsening cough for 10 days likely URI, transferred to [facility] for NSTEMI.    Pt had RRT x 2 today. First rapid was for hypotension after HD, dialysis stopped, no fluid removed, SBP returned to 105s and pt transferred back to medical floors.    Pt now once again had rapid for hypotension 70/30s given total ~1 L fluids over 1.5 hours, on POCUS pt a-lined bilaterally and compressible IVC but given pt's renal status and minimal improvement, the pressor Chapincito was initiated given her LVOT obstruction and pt transferred to MICU. At the rapid, pt also had a falsely elevated K with (?) bradycardia. Albuterol and Kayexalate given but repeat VBG showed K of 2.4, so Kayexalate was removed via PEG.        ROS:  Constitutional: denies fevers, chills, night sweats, weight loss  HEENT: denies visual changes, hearing changes, rhinitis, odynophagia, or dysphagia  Cardiovascular: denies palpitations, chest pain, edema  Respiratory: denies SOB, wheezing  Gastrointestinal: denies N/V/D, abdominal pain, hematochezia, melena  : denies dysuria, hematuria  MSK: denies weakness, joint pain  Skin: denies new rashes or masses    ICU Vital Signs Last 24 Hrs  T(C): 36.4 (13 Oct 2017 04:00), Max: 36.7 (12 Oct 2017 13:22)  T(F): 97.5 (13 Oct 2017 04:00), Max: 98.1 (12 Oct 2017 13:22)  HR: 70 (13 Oct 2017 06:30) (63 - 88)  BP: 109/58 (13 Oct 2017 06:30) (81/47 - 155/89)  BP(mean): 81 (13 Oct 2017 06:30) (57 - 95)  ABP: --  ABP(mean): --  RR: 21 (13 Oct 2017 06:30) (15 - 35)  SpO2: 97% (13 Oct 2017 06:30) (88% - 100%)      PHYSICAL EXAM:  GENERAL: NAD, well-developed  HEAD: Atraumatic, Normocephalic  EYES: EOMI, PERRLA, conjunctiva and sclera clear  NECK: Supple, No JVD  CHEST/LUNG: Clear to auscultation bilaterally; No wheezes/rales/rhonchi  HEART: Regular rate and rhythm; No murmurs, rubs, or gallops  ABDOMEN: Soft, Nontender, Nondistended; Bowel sounds present. +PEG  EXTREMITIES:  2+ dP pulses b/l, No clubbing, cyanosis, or edema  PSYCH: reactive affect  NEUROLOGY: AAOx3, non-focal  SKIN: No rashes or lesions    MEDICATIONS  (STANDING):  acetylcysteine 10% Inhalation 5 milliLiter(s) Inhalation two times a day  aspirin  chewable 81 milliGRAM(s) Enteral Tube daily  azithromycin  IVPB      aztreonam  IVPB 500 milliGRAM(s) IV Intermittent every 12 hours  aztreonam  IVPB      brimonidine 0.2% Ophthalmic Solution 1 Drop(s) Both EYES three times a day  dorzolamide 2%/timolol 0.5% Ophthalmic Solution 1 Drop(s) Both EYES daily  famotidine   Suspension 20 milliGRAM(s) Enteral Tube daily  heparin  Injectable 5000 Unit(s) SubCutaneous every 8 hours  influenza   Vaccine 0.5 milliLiter(s) IntraMuscular once  latanoprost 0.005% Ophthalmic Solution 1 Drop(s) Both EYES at bedtime  midodrine 15 milliGRAM(s) Oral <User Schedule>  midodrine 5 milliGRAM(s) Oral <User Schedule>  pantoprazole   Suspension 40 milliGRAM(s) Oral before breakfast  phenylephrine    Infusion 1 MICROgram(s)/kG/Min (22.125 mL/Hr) IV Continuous <Continuous>  sevelamer carbonate Powder 1600 milliGRAM(s) Oral three times a day with meals  simvastatin 40 milliGRAM(s) Oral at bedtime  sodium chloride 0.9% Bolus 250 milliLiter(s) IV Bolus once         10-13    140  |  102  |  80<H>  ----------------------------<  225<H>  2.2<LL>   |  15<L>  |  7.29<H>    Ca    10.0      13 Oct 2017 02:56  Phos  3.3     10-13  Mg     2.7     10-13    TPro  8.1  /  Alb  4.1  /  TBili  0.3  /  DBili  x   /  AST  27  /  ALT  26  /  AlkPhos  122<H>  10-12                          12.1   16.9  )-----------( 186      ( 13 Oct 2017 02:56 )             34.6     LIVER FUNCTIONS - ( 12 Oct 2017 06:36 )  Alb: 4.1 g/dL / Pro: 8.1 g/dL / ALK PHOS: 122 U/L / ALT: 26 U/L RC / AST: 27 U/L / GGT: x           PT/INR - ( 12 Oct 2017 22:45 )   PT: 10.7 sec;   INR: 0.98 ratio         PTT - ( 12 Oct 2017 22:45 )  PTT:45.3 sec          A/P:  82 yo F with pmhx HLD, CVA, dysphagia s/p PEG, dCHF, LVOT obstruction, GERD, ESRD on HD requiring midodrine, p/w worsening cough for 10 days likely URI, transferred to [facility] for NSTEMI.    #Neuro  - pt at baseline, A&O x 3    #Resp  - at baseline, no O2 support needed  - pt broadened to azithro/aztreonam/vanc during rapid for URI /sepsis factors that may have contributed to HoTN  - duoneb    #Cardiac  - NSTEMI care as per cardiology (house)  - c/w aspirin  - pt currently off Chapincito  - trend troponins until downtrending  - TTE pending for eval of LVOT obstruction  - will likely need standing daily midodrine if HR maintained without any further episodes of bradycardia  - tele monitoring for any ectopy/bradycardia  - c/w statin    #Renal  - will speak with Renal in am regarding need of HD  - c/w current midodrine  - BMP qday for K/Phos/Mg  - sevelemer    #GI  - PEG, c/w feeds  - dysphagia being evaluated by ENT, no clear source  - PPI, pepcid    # ID  - will c/w broad abx coverage for now, consider downgrading tomorrow if afebrile  - BCx pending 84 yo F with pmhx HLD, CVA, dysphagia s/p PEG, dCHF, LVOT obstruction, GERD, ESRD on HD requiring midodrine, originally presented to [place] for worsening cough for 10 days likely URI, transferred to [facility] for NSTEMI.    Pt had RRT x 2 today. First rapid was for hypotension after HD, dialysis stopped, no fluid removed, SBP returned to 105s and pt transferred back to medical floors.    Pt now once again had rapid for hypotension 70/30s given total ~1 L fluids over 1.5 hours, on POCUS pt a-lined bilaterally and compressible IVC but given pt's renal status and minimal improvement, the pressor Chapincito was initiated given her LVOT obstruction and pt transferred to MICU. At the rapid, pt also had a falsely elevated K with (?) bradycardia. Albuterol and Kayexalate given but repeat VBG showed K of 2.4, so Kayexalate was removed via PEG.        ROS:  Constitutional: denies fevers, chills, night sweats, weight loss  HEENT: denies visual changes, hearing changes, rhinitis, odynophagia, or dysphagia  Cardiovascular: denies palpitations, chest pain, edema  Respiratory: denies SOB, wheezing  Gastrointestinal: denies N/V/D, abdominal pain, hematochezia, melena  : denies dysuria, hematuria  MSK: denies weakness, joint pain  Skin: denies new rashes or masses    ICU Vital Signs Last 24 Hrs  T(C): 36.4 (13 Oct 2017 04:00), Max: 36.7 (12 Oct 2017 13:22)  T(F): 97.5 (13 Oct 2017 04:00), Max: 98.1 (12 Oct 2017 13:22)  HR: 70 (13 Oct 2017 06:30) (63 - 88)  BP: 109/58 (13 Oct 2017 06:30) (81/47 - 155/89)  BP(mean): 81 (13 Oct 2017 06:30) (57 - 95)  RR: 21 (13 Oct 2017 06:30) (15 - 35)  SpO2: 97% (13 Oct 2017 06:30) (88% - 100%)      PHYSICAL EXAM:  GENERAL: NAD, well-developed  HEAD: Atraumatic, Normocephalic  EYES: EOMI, PERRLA, conjunctiva and sclera clear  NECK: Supple, No JVD  CHEST/LUNG: Clear to auscultation bilaterally; No wheezes/rales/rhonchi  HEART: Regular rate and rhythm; No murmurs, rubs, or gallops  ABDOMEN: Soft, Nontender, Nondistended; Bowel sounds present. +PEG  EXTREMITIES:  2+ dP pulses b/l, No clubbing, cyanosis, or edema  PSYCH: reactive affect  NEUROLOGY: AAOx3, non-focal  SKIN: No rashes or lesions    MEDICATIONS  (STANDING):  acetylcysteine 10% Inhalation 5 milliLiter(s) Inhalation two times a day  aspirin  chewable 81 milliGRAM(s) Enteral Tube daily  azithromycin  IVPB      aztreonam  IVPB 500 milliGRAM(s) IV Intermittent every 12 hours  aztreonam  IVPB      brimonidine 0.2% Ophthalmic Solution 1 Drop(s) Both EYES three times a day  dorzolamide 2%/timolol 0.5% Ophthalmic Solution 1 Drop(s) Both EYES daily  famotidine   Suspension 20 milliGRAM(s) Enteral Tube daily  heparin  Injectable 5000 Unit(s) SubCutaneous every 8 hours  influenza   Vaccine 0.5 milliLiter(s) IntraMuscular once  latanoprost 0.005% Ophthalmic Solution 1 Drop(s) Both EYES at bedtime  midodrine 15 milliGRAM(s) Oral <User Schedule>  midodrine 5 milliGRAM(s) Oral <User Schedule>  pantoprazole   Suspension 40 milliGRAM(s) Oral before breakfast  phenylephrine    Infusion 1 MICROgram(s)/kG/Min (22.125 mL/Hr) IV Continuous <Continuous>  sevelamer carbonate Powder 1600 milliGRAM(s) Oral three times a day with meals  simvastatin 40 milliGRAM(s) Oral at bedtime  sodium chloride 0.9% Bolus 250 milliLiter(s) IV Bolus once         10-13    140  |  102  |  80<H>  ----------------------------<  225<H>  2.2<LL>   |  15<L>  |  7.29<H>    Ca    10.0      13 Oct 2017 02:56  Phos  3.3     10-13  Mg     2.7     10-13    TPro  8.1  /  Alb  4.1  /  TBili  0.3  /  DBili  x   /  AST  27  /  ALT  26  /  AlkPhos  122<H>  10-12                          12.1   16.9  )-----------( 186      ( 13 Oct 2017 02:56 )             34.6     LIVER FUNCTIONS - ( 12 Oct 2017 06:36 )  Alb: 4.1 g/dL / Pro: 8.1 g/dL / ALK PHOS: 122 U/L / ALT: 26 U/L RC / AST: 27 U/L / GGT: x           PT/INR - ( 12 Oct 2017 22:45 )   PT: 10.7 sec;   INR: 0.98 ratio         PTT - ( 12 Oct 2017 22:45 )  PTT:45.3 sec          A/P:  84 yo F with pmhx HLD, CVA, dysphagia s/p PEG, dCHF, LVOT obstruction, GERD, ESRD on HD requiring midodrine, p/w worsening cough for 10 days likely URI, transferred to [facility] for NSTEMI.    #Neuro  - pt at baseline, A&O x 3    #Resp  - at baseline, no O2 support needed  - pt broadened to azithro/aztreonam/vanc during rapid for URI /sepsis factors that may have contributed to HoTN  - duoneb    #Cardiac  - NSTEMI care as per cardiology ([place])  - c/w aspirin  - pt currently off Chapincito  - trend troponins until downtrending  - TTE pending for eval of LVOT obstruction  - will likely need standing daily midodrine if HR maintained without any further episodes of bradycardia  - tele monitoring for any ectopy/bradycardia  - c/w statin    #Renal  - will speak with Renal in am regarding need of HD  - c/w current midodrine  - BMP qday for K/Phos/Mg  - sevelemer    #GI  - PEG, c/w feeds  - dysphagia being evaluated by ENT, no clear source  - PPI, pepcid    # ID  - will c/w broad abx coverage for now, consider downgrading tomorrow if afebrile  - BCx pending

## 2017-10-13 NOTE — DIETITIAN INITIAL EVALUATION ADULT. - ENERGY NEEDS
Ht: 60"  Wt: 100 (pt's reported dry wt)   BMI: 19.6 kg/m2   IBW:100  (+/-10%)     within IBW range  Edema: none   Skin: no pressure injuries

## 2017-10-13 NOTE — DIETITIAN INITIAL EVALUATION ADULT. - OTHER INFO
Pt reports dry wt of 100lb. Noted wide variations in wt 4/2/17 118lb, 4/8/17 100lb, this adm 10/12 113lb, 129.8, 10/13 113lb.      Pt seen for: consult tube feeding.   Adm dx:  83F c hx HLD, CVA c/b dysphagia s/p PEG, grade 1 DHF, LVOT obstruction, GERD, ESRD (T/Th/Sat via left IJ tunneled dialysis cath), left arm vascular stent, hypotension requiring midodrine, presented to [place] for worsening cough for 10 days, transferred to [facility] for NSTEMI mgmt.   GI issues: denies N/V/D  Last BM:  had soft BM today.   Food allergies:    Vit/supplement PTA: Pt reports dry wt of 100lb. Noted wide variations in wt 4/2/17 118lb, 4/8/17 100lb, this adm 10/12 113lb, 129.8, 10/13 113lb.      Pt seen for: consult tube feeding.   Adm dx:  83F c hx HLD, CVA c/b dysphagia s/p PEG, grade 1 DHF, LVOT obstruction, GERD, ESRD (T/Th/Sat via left IJ tunneled dialysis cath), left arm vascular stent, hypotension requiring midodrine, presented to [place] for worsening cough for 10 days, transferred to [facility] for NSTEMI mgmt. Had HD yesterday.     GI issues: denies N/V/D  Last BM:  had soft BM today.   Food allergies: MSG   Vit/supplement PTA: renavite, renagel

## 2017-10-13 NOTE — PROGRESS NOTE ADULT - PROBLEM SELECTOR PLAN 5
await CT abd  may need GI consult for egd r/o obstruction as cause of difficulty managing secretions

## 2017-10-13 NOTE — PROGRESS NOTE ADULT - ASSESSMENT
83F c hx HLD, CVA c/b dysphagia s/p PEG, grade 1 DHF, LVOT obstruction, GERD, ESRD (T/Th/Sat via left IJ tunneled dialysis cath), left arm vascular stent, hypotension requiring midodrine, presented to [place] for worsening cough for 10 days, transferred to [facility] for NSTEMI mgmt. c/c/b hypotension/bradycardia sent to MICU on pressors

## 2017-10-13 NOTE — PROGRESS NOTE ADULT - PROBLEM SELECTOR PLAN 3
pt with difficulty managing oral saliva above her baseline, also with epigastric discomfort. await CT abd - many need GI consult for EGD-r/o obstruction as cause of difficulty managing saliva  d/w micu team

## 2017-10-14 LAB
ALBUMIN SERPL ELPH-MCNC: 2.8 G/DL — LOW (ref 3.3–5)
ALP SERPL-CCNC: 89 U/L — SIGNIFICANT CHANGE UP (ref 40–120)
ALT FLD-CCNC: 22 U/L RC — SIGNIFICANT CHANGE UP (ref 10–45)
ANION GAP SERPL CALC-SCNC: 14 MMOL/L — SIGNIFICANT CHANGE UP (ref 5–17)
ANION GAP SERPL CALC-SCNC: 14 MMOL/L — SIGNIFICANT CHANGE UP (ref 5–17)
ANION GAP SERPL CALC-SCNC: 9 MMOL/L — SIGNIFICANT CHANGE UP (ref 5–17)
AST SERPL-CCNC: 34 U/L — SIGNIFICANT CHANGE UP (ref 10–40)
BASOPHILS # BLD AUTO: 0 K/UL — SIGNIFICANT CHANGE UP (ref 0–0.2)
BASOPHILS NFR BLD AUTO: 0.2 % — SIGNIFICANT CHANGE UP (ref 0–2)
BILIRUB SERPL-MCNC: 0.2 MG/DL — SIGNIFICANT CHANGE UP (ref 0.2–1.2)
BUN SERPL-MCNC: 18 MG/DL — SIGNIFICANT CHANGE UP (ref 7–23)
BUN SERPL-MCNC: 36 MG/DL — HIGH (ref 7–23)
BUN SERPL-MCNC: 45 MG/DL — HIGH (ref 7–23)
CALCIUM SERPL-MCNC: 8.5 MG/DL — SIGNIFICANT CHANGE UP (ref 8.4–10.5)
CALCIUM SERPL-MCNC: 9.2 MG/DL — SIGNIFICANT CHANGE UP (ref 8.4–10.5)
CALCIUM SERPL-MCNC: < 3 (ref 8.4–10.5)
CHLORIDE SERPL-SCNC: 102 MMOL/L — SIGNIFICANT CHANGE UP (ref 96–108)
CHLORIDE SERPL-SCNC: 106 MMOL/L — SIGNIFICANT CHANGE UP (ref 96–108)
CHLORIDE SERPL-SCNC: 132 MMOL/L — HIGH (ref 96–108)
CO2 SERPL-SCNC: 11 MMOL/L — LOW (ref 22–31)
CO2 SERPL-SCNC: 23 MMOL/L — SIGNIFICANT CHANGE UP (ref 22–31)
CO2 SERPL-SCNC: 25 MMOL/L — SIGNIFICANT CHANGE UP (ref 22–31)
CREAT SERPL-MCNC: 1.34 MG/DL — HIGH (ref 0.5–1.3)
CREAT SERPL-MCNC: 4.01 MG/DL — HIGH (ref 0.5–1.3)
CREAT SERPL-MCNC: 4.78 MG/DL — HIGH (ref 0.5–1.3)
EOSINOPHIL # BLD AUTO: 0 K/UL — SIGNIFICANT CHANGE UP (ref 0–0.5)
EOSINOPHIL NFR BLD AUTO: 0.3 % — SIGNIFICANT CHANGE UP (ref 0–6)
GLUCOSE BLDC GLUCOMTR-MCNC: 111 MG/DL — HIGH (ref 70–99)
GLUCOSE SERPL-MCNC: 129 MG/DL — HIGH (ref 70–99)
GLUCOSE SERPL-MCNC: 40 MG/DL — CRITICAL LOW (ref 70–99)
GLUCOSE SERPL-MCNC: 99 MG/DL — SIGNIFICANT CHANGE UP (ref 70–99)
HCT VFR BLD CALC: 33.9 % — LOW (ref 34.5–45)
HGB BLD-MCNC: 11 G/DL — LOW (ref 11.5–15.5)
LYMPHOCYTES # BLD AUTO: 0.6 K/UL — LOW (ref 1–3.3)
LYMPHOCYTES # BLD AUTO: 4.1 % — LOW (ref 13–44)
MAGNESIUM SERPL-MCNC: 2.1 MG/DL — SIGNIFICANT CHANGE UP (ref 1.6–2.6)
MAGNESIUM SERPL-MCNC: 2.2 MG/DL — SIGNIFICANT CHANGE UP (ref 1.6–2.6)
MCHC RBC-ENTMCNC: 31.6 PG — SIGNIFICANT CHANGE UP (ref 27–34)
MCHC RBC-ENTMCNC: 32.5 GM/DL — SIGNIFICANT CHANGE UP (ref 32–36)
MCV RBC AUTO: 97.2 FL — SIGNIFICANT CHANGE UP (ref 80–100)
MONOCYTES # BLD AUTO: 1.4 K/UL — HIGH (ref 0–0.9)
MONOCYTES NFR BLD AUTO: 9.6 % — SIGNIFICANT CHANGE UP (ref 2–14)
NEUTROPHILS # BLD AUTO: 12.7 K/UL — HIGH (ref 1.8–7.4)
NEUTROPHILS NFR BLD AUTO: 85.9 % — HIGH (ref 43–77)
PHOSPHATE SERPL-MCNC: 1.5 MG/DL — LOW (ref 2.5–4.5)
PHOSPHATE SERPL-MCNC: 3.4 MG/DL — SIGNIFICANT CHANGE UP (ref 2.5–4.5)
PLATELET # BLD AUTO: 198 K/UL — SIGNIFICANT CHANGE UP (ref 150–400)
POTASSIUM SERPL-MCNC: 1.7 MMOL/L — CRITICAL LOW (ref 3.5–5.3)
POTASSIUM SERPL-MCNC: 2.9 MMOL/L — CRITICAL LOW (ref 3.5–5.3)
POTASSIUM SERPL-MCNC: 3.9 MMOL/L — SIGNIFICANT CHANGE UP (ref 3.5–5.3)
POTASSIUM SERPL-SCNC: 1.7 MMOL/L — CRITICAL LOW (ref 3.5–5.3)
POTASSIUM SERPL-SCNC: 2.9 MMOL/L — CRITICAL LOW (ref 3.5–5.3)
POTASSIUM SERPL-SCNC: 3.9 MMOL/L — SIGNIFICANT CHANGE UP (ref 3.5–5.3)
PROT SERPL-MCNC: 5.3 G/DL — LOW (ref 6–8.3)
RBC # BLD: 3.49 M/UL — LOW (ref 3.8–5.2)
RBC # FLD: 16.9 % — HIGH (ref 10.3–14.5)
SODIUM SERPL-SCNC: 141 MMOL/L — SIGNIFICANT CHANGE UP (ref 135–145)
SODIUM SERPL-SCNC: 143 MMOL/L — SIGNIFICANT CHANGE UP (ref 135–145)
SODIUM SERPL-SCNC: 152 MMOL/L — HIGH (ref 135–145)
WBC # BLD: 14.8 K/UL — HIGH (ref 3.8–10.5)
WBC # FLD AUTO: 14.8 K/UL — HIGH (ref 3.8–10.5)

## 2017-10-14 PROCEDURE — 99291 CRITICAL CARE FIRST HOUR: CPT

## 2017-10-14 RX ORDER — SODIUM CHLORIDE 9 MG/ML
250 INJECTION INTRAMUSCULAR; INTRAVENOUS; SUBCUTANEOUS ONCE
Qty: 0 | Refills: 0 | Status: COMPLETED | OUTPATIENT
Start: 2017-10-14 | End: 2017-10-14

## 2017-10-14 RX ORDER — ROBINUL 0.2 MG/ML
0.4 INJECTION INTRAMUSCULAR; INTRAVENOUS EVERY 6 HOURS
Qty: 0 | Refills: 0 | Status: DISCONTINUED | OUTPATIENT
Start: 2017-10-14 | End: 2017-10-20

## 2017-10-14 RX ORDER — PHENYLEPHRINE HYDROCHLORIDE 10 MG/ML
1 INJECTION INTRAVENOUS
Qty: 80 | Refills: 0 | Status: DISCONTINUED | OUTPATIENT
Start: 2017-10-14 | End: 2017-10-15

## 2017-10-14 RX ORDER — MIDODRINE HYDROCHLORIDE 2.5 MG/1
10 TABLET ORAL THREE TIMES A DAY
Qty: 0 | Refills: 0 | Status: DISCONTINUED | OUTPATIENT
Start: 2017-10-14 | End: 2017-10-16

## 2017-10-14 RX ORDER — POTASSIUM CHLORIDE 20 MEQ
40 PACKET (EA) ORAL ONCE
Qty: 0 | Refills: 0 | Status: COMPLETED | OUTPATIENT
Start: 2017-10-14 | End: 2017-10-14

## 2017-10-14 RX ORDER — POTASSIUM PHOSPHATE, MONOBASIC POTASSIUM PHOSPHATE, DIBASIC 236; 224 MG/ML; MG/ML
15 INJECTION, SOLUTION INTRAVENOUS ONCE
Qty: 0 | Refills: 0 | Status: COMPLETED | OUTPATIENT
Start: 2017-10-14 | End: 2017-10-14

## 2017-10-14 RX ADMIN — ROBINUL 0.4 MILLIGRAM(S): 0.2 INJECTION INTRAMUSCULAR; INTRAVENOUS at 13:51

## 2017-10-14 RX ADMIN — HEPARIN SODIUM 5000 UNIT(S): 5000 INJECTION INTRAVENOUS; SUBCUTANEOUS at 13:48

## 2017-10-14 RX ADMIN — BRIMONIDINE TARTRATE 1 DROP(S): 2 SOLUTION/ DROPS OPHTHALMIC at 13:48

## 2017-10-14 RX ADMIN — Medication 650 MILLIGRAM(S): at 13:44

## 2017-10-14 RX ADMIN — BRIMONIDINE TARTRATE 1 DROP(S): 2 SOLUTION/ DROPS OPHTHALMIC at 06:17

## 2017-10-14 RX ADMIN — LATANOPROST 1 DROP(S): 0.05 SOLUTION/ DROPS OPHTHALMIC; TOPICAL at 22:04

## 2017-10-14 RX ADMIN — PANTOPRAZOLE SODIUM 40 MILLIGRAM(S): 20 TABLET, DELAYED RELEASE ORAL at 06:17

## 2017-10-14 RX ADMIN — Medication 650 MILLIGRAM(S): at 20:00

## 2017-10-14 RX ADMIN — DORZOLAMIDE HYDROCHLORIDE TIMOLOL MALEATE 1 DROP(S): 20; 5 SOLUTION/ DROPS OPHTHALMIC at 12:36

## 2017-10-14 RX ADMIN — Medication 81 MILLIGRAM(S): at 12:35

## 2017-10-14 RX ADMIN — Medication 3 MILLILITER(S): at 06:32

## 2017-10-14 RX ADMIN — HEPARIN SODIUM 5000 UNIT(S): 5000 INJECTION INTRAVENOUS; SUBCUTANEOUS at 22:04

## 2017-10-14 RX ADMIN — MIDODRINE HYDROCHLORIDE 10 MILLIGRAM(S): 2.5 TABLET ORAL at 13:46

## 2017-10-14 RX ADMIN — Medication 650 MILLIGRAM(S): at 19:35

## 2017-10-14 RX ADMIN — Medication 650 MILLIGRAM(S): at 12:34

## 2017-10-14 RX ADMIN — MIDODRINE HYDROCHLORIDE 10 MILLIGRAM(S): 2.5 TABLET ORAL at 06:17

## 2017-10-14 RX ADMIN — Medication 1 TABLET(S): at 12:35

## 2017-10-14 RX ADMIN — SIMVASTATIN 40 MILLIGRAM(S): 20 TABLET, FILM COATED ORAL at 22:04

## 2017-10-14 RX ADMIN — HEPARIN SODIUM 5000 UNIT(S): 5000 INJECTION INTRAVENOUS; SUBCUTANEOUS at 06:17

## 2017-10-14 RX ADMIN — Medication 40 MILLIEQUIVALENT(S): at 09:44

## 2017-10-14 RX ADMIN — POTASSIUM PHOSPHATE, MONOBASIC POTASSIUM PHOSPHATE, DIBASIC 62.5 MILLIMOLE(S): 236; 224 INJECTION, SOLUTION INTRAVENOUS at 06:16

## 2017-10-14 RX ADMIN — SODIUM CHLORIDE 250 MILLILITER(S): 9 INJECTION INTRAMUSCULAR; INTRAVENOUS; SUBCUTANEOUS at 15:11

## 2017-10-14 RX ADMIN — Medication 5 MILLILITER(S): at 06:32

## 2017-10-14 RX ADMIN — BRIMONIDINE TARTRATE 1 DROP(S): 2 SOLUTION/ DROPS OPHTHALMIC at 22:07

## 2017-10-14 RX ADMIN — MIDODRINE HYDROCHLORIDE 10 MILLIGRAM(S): 2.5 TABLET ORAL at 22:04

## 2017-10-14 NOTE — PROGRESS NOTE ADULT - SUBJECTIVE AND OBJECTIVE BOX
Patient seen and examined  no complaints  s/p HD yesterday- flow sheet reviewed- became hypotensive at the end of HD - terminated approx 20 minutes earlier  currently has no complaints  denies any sob or cp    MSG causes dizziness (Other)  pcn (Hives)  penicillin (Hives)    Hospital Medications:   MEDICATIONS  (STANDING):  acetylcysteine 10% Inhalation 5 milliLiter(s) Inhalation two times a day  aspirin  chewable 81 milliGRAM(s) Enteral Tube daily  brimonidine 0.2% Ophthalmic Solution 1 Drop(s) Both EYES three times a day  dorzolamide 2%/timolol 0.5% Ophthalmic Solution 1 Drop(s) Both EYES daily  heparin  Injectable 5000 Unit(s) SubCutaneous every 8 hours  influenza   Vaccine 0.5 milliLiter(s) IntraMuscular once  latanoprost 0.005% Ophthalmic Solution 1 Drop(s) Both EYES at bedtime  midodrine 10 milliGRAM(s) Oral three times a day  Nephro-asim 1 Tablet(s) Oral daily  pantoprazole   Suspension 40 milliGRAM(s) Oral before breakfast  phenylephrine    Infusion 1 MICROgram(s)/kG/Min (22.125 mL/Hr) IV Continuous <Continuous>  simvastatin 40 milliGRAM(s) Oral at bedtime    VITALS:  T(F): 98.1 (10-14-17 @ 07:45), Max: 98.5 (10-14-17 @ 04:00)  HR: 65 (10-14-17 @ 10:00)  BP: 117/59 (10-14-17 @ 10:00)  RR: 30 (10-14-17 @ 10:00)  SpO2: 98% (10-14-17 @ 10:00)  Wt(kg): --    10-13 @ 07:01  -  10-14 @ 07:00  --------------------------------------------------------  IN: 1173.5 mL / OUT: 0 mL / NET: 1173.5 mL    10-14 @ 07:01  -  10-14 @ 11:24  --------------------------------------------------------  IN: 227.2 mL / OUT: 0 mL / NET: 227.2 mL        PHYSICAL EXAM:  Constitutional: confused  HEENT: anicteric sclera, oropharynx clear, MMM  Neck: No JVD  Respiratory: b/l rhonchi  Cardiovascular: S1, S2, RRR  Gastrointestinal: BS+, soft, NT/ND  Extremities: No cyanosis or clubbing. No peripheral edema  Vascular Access: left PC    LABS:  10-14    141  |  102  |  36<H>  ----------------------------<  99  2.9<LL>   |  25  |  4.01<H>    Ca    8.5      14 Oct 2017 03:59  Phos  1.5     10-14  Mg     2.1     10-14    TPro  5.3<L>  /  Alb  2.8<L>  /  TBili  0.2  /  DBili      /  AST  34  /  ALT  22  /  AlkPhos  89  10-14    Creatinine Trend: 4.01 <--, 6.81 <--, 7.29 <--, 7.48 <--, 7.79 <--, 9.04 <--, 11.52 <--, 11.13 <--, 9.74 <--, 10.80 <--                        11.0   14.8  )-----------( 198      ( 14 Oct 2017 03:59 )             33.9     Urine Studies:      RADIOLOGY & ADDITIONAL STUDIES:

## 2017-10-14 NOTE — PROGRESS NOTE ADULT - ASSESSMENT
83F c hx HLD, CVA c/b dysphagia s/p PEG, grade 1 DHF, LVOT obstruction, GERD, ESRD (T/Th/Sat via left IJ tunneled dialysis cath), left arm vascular stent, hypotension requiring midodrine, presented to [facility] for worsening cough for 10 days, transferred to [facility] for NSTEMI mgmt. c/c/b hypotension/bradycardia

## 2017-10-14 NOTE — PROGRESS NOTE ADULT - ASSESSMENT
83F w/ history ESRD, LVOT obstruction/LVH p/w symptoms of URI, atypical chest pain, elevated cardiac biomarkers likely due to fluid retention from missed HD treatment in the setting of ESRD.  Course complicated by hypotension, bradycardia and syncope requiring transition to MICU  Echo has demonstrated at least moderate A.S. as well as mod to severe LV outflow tract obstruction; LV function preserved (hyperdynamic).    #Syncope - after reviewing telemetry strip around the time of the event. The P-P interval lengthened beat to beat around the event which is consistent with vagal syncope. There is report that she may of had a BM around that time which lines up with the story. In the setting of midodrine this may have been exacerbated by the medication. Unfortunately, she needs to keep the midodrine for dialysis purposes.  - continue monitoring clinically with fall precautions  - discussed with son at bedside    #LVOT obstruction with TRICE - concerning in the setting of syncope as valsalva maneuvers classically exacerbate the obstruction and can also makes the situation difficult to manage  - after goes through dialysis today and demonstrates stability for the next day or so I would consider starting a negative inotrope (eg. beta blocker) to help with the above issue    #Aortic stenosis - on exam she has a mid to late peaking murmur that is difficult to characterize because of the LVOT obstruction. It probably is not severe or critical at this time and we should hold off any further invasive testing for now.    Dr. Giacomo M.D.  [phone]    Patient seen and examined; discussed with cardiology fellow. Hx., exam and labs as above.  I agree with the assessment and recommendations.   Joel Whitten M.D.  Cardiology Consult Service  [phone] or [phone] 83F w/ history ESRD, LVOT obstruction/LVH p/w symptoms of URI, atypical chest pain, elevated cardiac biomarkers likely due to fluid retention from missed HD treatment in the setting of ESRD. Course complicated by hypotension, bradycardia and syncope attributed to vasovagal (during BM, with prolonging PP intervals) requiring transition to MICU. However given, Echo has demonstrated at least moderate A.S. with LVOT obstruction (54mmhg) with TRICE with hyperdynamic LV, must consider and manage LVOT obstruction as etiology. Clinically, she appears euvolemic to dry.     #LVOT obstruction with TRICE with hyperdynamic LV- LV on TTE appears underfilled and clinically appears dry.  - would optimize preload/ LV filling with prn fluids for hypotension, limit fluid removal via HD. HR is already at goal at around 60 without bblockers  - agree with phenylephrine for hypotension, can consider using IVF to titrate off phenyl    [id number]

## 2017-10-14 NOTE — PROGRESS NOTE ADULT - SUBJECTIVE AND OBJECTIVE BOX
24H hour events:   hypotensive yest    MEDICATIONS:  aspirin  chewable 81 milliGRAM(s) Enteral Tube daily  heparin  Injectable 5000 Unit(s) SubCutaneous every 8 hours  midodrine 10 milliGRAM(s) Oral three times a day  phenylephrine    Infusion 1 MICROgram(s)/kG/Min IV Continuous <Continuous>      acetylcysteine 10% Inhalation 5 milliLiter(s) Inhalation two times a day  ALBUTerol/ipratropium for Nebulization 3 milliLiter(s) Nebulizer every 6 hours PRN    acetaminophen    Suspension. 650 milliGRAM(s) Oral every 6 hours PRN    pantoprazole   Suspension 40 milliGRAM(s) Oral before breakfast    simvastatin 40 milliGRAM(s) Oral at bedtime    benzocaine 15 mG/menthol 3.6 mG Lozenge 1 Lozenge Oral every 6 hours PRN  brimonidine 0.2% Ophthalmic Solution 1 Drop(s) Both EYES three times a day  dorzolamide 2%/timolol 0.5% Ophthalmic Solution 1 Drop(s) Both EYES daily  influenza   Vaccine 0.5 milliLiter(s) IntraMuscular once  latanoprost 0.005% Ophthalmic Solution 1 Drop(s) Both EYES at bedtime  Nephro-asim 1 Tablet(s) Oral daily      REVIEW OF SYSTEMS:  General: no fatigue/malaise, weight loss/gain.  Skin: no rashes.  Ophthalmologic: no blurred vision, no loss of vision. 	  ENT: no sore throat, rhinorrhea, sinus congestion.  Respiratory: no SOB, cough or wheeze.  Gastrointestinal:  no N/V/D, no melena/hematemesis/hematochezia.  Genitourinary: no dysuria/hesitancy or hematuria.  Musculoskeletal: no myalgias or arthralgias.  Neurological: no changes in vision or hearing, no lightheadedness/dizziness, no syncope/near syncope	  Psychiatric: no unusual stress/anxiety.   Hematology/Lymphatics: no unusual bleeding, bruising and no lymphadenopathy.  Endocrine: no unusual thirst.   All others negative except as stated above and in HPI.    PHYSICAL EXAM:  T(C): 36.9 (10-14-17 @ 04:00), Max: 36.9 (10-14-17 @ 04:00)  HR: 68 (10-14-17 @ 07:30) (46 - 78)  BP: 117/57 (10-14-17 @ 07:30) (60/42 - 145/65)  RR: 17 (10-14-17 @ 07:30) (16 - 32)  SpO2: 97% (10-14-17 @ 07:30) (91% - 100%)  Wt(kg): --  I&O's Summary    13 Oct 2017 07:01  -  14 Oct 2017 07:00  --------------------------------------------------------  IN: 1173.5 mL / OUT: 0 mL / NET: 1173.5 mL    14 Oct 2017 07:01  -  14 Oct 2017 07:52  --------------------------------------------------------  IN: 73.6 mL / OUT: 0 mL / NET: 73.6 mL        Appearance: Normal	  HEENT:   Normal oral mucosa, PERRL, EOMI	  Lymphatic: No lymphadenopathy  Cardiovascular: Normal S1 S2, No JVD, No murmurs, No edema  Respiratory: Lungs clear to auscultation	  Psychiatry: A & O x 3, Mood & affect appropriate  Gastrointestinal:  Soft, Non-tender, + BS	  Skin: No rashes, No ecchymoses, No cyanosis	  Neurologic: Non-focal  Extremities: Normal range of motion, No clubbing, cyanosis or edema  Vascular: Peripheral pulses palpable 2+ bilaterally        LABS:	 	    CBC Full  -  ( 14 Oct 2017 03:59 )  WBC Count : 14.8 K/uL  Hemoglobin : 11.0 g/dL  Hematocrit : 33.9 %  Platelet Count - Automated : 198 K/uL  Mean Cell Volume : 97.2 fl  Mean Cell Hemoglobin : 31.6 pg  Mean Cell Hemoglobin Concentration : 32.5 gm/dL  Auto Neutrophil # : 12.7 K/uL  Auto Lymphocyte # : 0.6 K/uL  Auto Monocyte # : 1.4 K/uL  Auto Eosinophil # : 0.0 K/uL  Auto Basophil # : 0.0 K/uL  Auto Neutrophil % : 85.9 %  Auto Lymphocyte % : 4.1 %  Auto Monocyte % : 9.6 %  Auto Eosinophil % : 0.3 %  Auto Basophil % : 0.2 %    10-14    141  |  102  |  36<H>  ----------------------------<  99  2.9<LL>   |  25  |  4.01<H>  10-13    145  |  105  |  78<H>  ----------------------------<  91  2.6<LL>   |  17<L>  |  6.81<H>    Ca    8.5      14 Oct 2017 03:59  Ca    8.8      13 Oct 2017 07:00  Phos  1.5     10-14  Phos  3.3     10-13  Mg     2.1     10-14  Mg     2.7     10-13    TPro  5.3<L>  /  Alb  2.8<L>  /  TBili  0.2  /  DBili  x   /  AST  34  /  ALT  22  /  AlkPhos  89  10-14    < from: Transthoracic Echocardiogram (10.13.17 @ 08:56) >  Dimensions:    Normal Values:  LA:     3.1    2.0 - 4.0 cm  Ao:     3.3    2.0 - 3.8 cm  SEPTUM: 1.3    0.6 - 1.2 cm  PWT:    0.9    0.6 - 1.1 cm  LVIDd:  4.1    3.0 - 5.6 cm  LVIDs:  2.2    1.8 - 4.0 cm  Derived variables:  LVMI: 98 g/m2  RWT: 0.43  Fractional short: 46 %  EF (Visual Estimate): 80-85 %  Doppler Peak Velocity (m/sec): AoV=3.4  ------------------------------------------------------------------------  Observations:  Mitral Valve: Mitral annular calcification. There is  systolic anterior motion of the mitral valve. Moderate,  eccentric, posteriorly-directed mitral regurgitation. Mean  transmitralvalve gradient equals 2 mm Hg.  Aortic Valve/Aorta: Aortic valve not well visualized;  appears to be a calcified trileaflet valve with decreased  opening. Measured peak transaortic valve gradient equals 47  mm Hg. Unable to assess degree of aortic stenosis due to  the presence of systolic anterior motion of the mitral  valve and elevated LVOT gradient. Visually, the aortic  valve appears at least moderately stenotic. Consider REDD to  evaluate the aortic valve if clinically indicated. No  aortic valve regurgitation seen. Peak left ventricular  outflow tract gradient equals 54 mm Hg, consistent with  moderately severe LVOT obstruction.  Aortic Root: 3.3 cm.  Left Atrium: Mildly dilated left atrium.  LA volume index =  39 cc/m2. Mobile interatrial septum.  Left Ventricle: Hyperdynamic left ventricle. Mild left  ventricular hypertrophy with basal septal bulge. Basal  septal thickness=1.3 cm. Mild diastolic dysfunction (Stage  I).  Right Heart: Normal right atrium. Normal right ventricular  size and function. Normal tricuspid valve. Mild-moderate  tricuspid regurgitation. Normal pulmonic valve.  Pericardium/Pleura: Normal pericardium with no pericardial  effusion.  Hemodynamic: Estimated right atrial pressure is 8 mm Hg.  Estimated right ventricular systolic pressure equals 44 mm  Hg, assuming right atrial pressure equals 8 mm Hg,  consistent with mild pulmonary hypertension.  ------------------------------------------------------------------------    < end of copied text > 24H hour events:   hypotensive yest  this AM feels well. +productive cough    tele: no events     MEDICATIONS:  aspirin  chewable 81 milliGRAM(s) Enteral Tube daily  heparin  Injectable 5000 Unit(s) SubCutaneous every 8 hours  midodrine 10 milliGRAM(s) Oral three times a day  phenylephrine    Infusion 1 MICROgram(s)/kG/Min IV Continuous <Continuous>      acetylcysteine 10% Inhalation 5 milliLiter(s) Inhalation two times a day  ALBUTerol/ipratropium for Nebulization 3 milliLiter(s) Nebulizer every 6 hours PRN    acetaminophen    Suspension. 650 milliGRAM(s) Oral every 6 hours PRN    pantoprazole   Suspension 40 milliGRAM(s) Oral before breakfast    simvastatin 40 milliGRAM(s) Oral at bedtime    benzocaine 15 mG/menthol 3.6 mG Lozenge 1 Lozenge Oral every 6 hours PRN  brimonidine 0.2% Ophthalmic Solution 1 Drop(s) Both EYES three times a day  dorzolamide 2%/timolol 0.5% Ophthalmic Solution 1 Drop(s) Both EYES daily  influenza   Vaccine 0.5 milliLiter(s) IntraMuscular once  latanoprost 0.005% Ophthalmic Solution 1 Drop(s) Both EYES at bedtime  Nephro-asim 1 Tablet(s) Oral daily      REVIEW OF SYSTEMS:  General: no fatigue/malaise, weight loss/gain.  Skin: no rashes.  Ophthalmologic: no blurred vision, no loss of vision. 	  ENT: no sore throat, rhinorrhea, sinus congestion.  Respiratory: no SOB, cough or wheeze.  Gastrointestinal:  no N/V/D, no melena/hematemesis/hematochezia.  Genitourinary: no dysuria/hesitancy or hematuria.  Musculoskeletal: no myalgias or arthralgias.  Neurological: no changes in vision or hearing, no lightheadedness/dizziness, no syncope/near syncope	  Psychiatric: no unusual stress/anxiety.   Hematology/Lymphatics: no unusual bleeding, bruising and no lymphadenopathy.  Endocrine: no unusual thirst.   All others negative except as stated above and in HPI.    PHYSICAL EXAM:  T(C): 36.9 (10-14-17 @ 04:00), Max: 36.9 (10-14-17 @ 04:00)  HR: 68 (10-14-17 @ 07:30) (46 - 78)  BP: 117/57 (10-14-17 @ 07:30) (60/42 - 145/65)  RR: 17 (10-14-17 @ 07:30) (16 - 32)  SpO2: 97% (10-14-17 @ 07:30) (91% - 100%)  Wt(kg): --  I&O's Summary    13 Oct 2017 07:01  -  14 Oct 2017 07:00  --------------------------------------------------------  IN: 1173.5 mL / OUT: 0 mL / NET: 1173.5 mL    14 Oct 2017 07:01  -  14 Oct 2017 07:52  --------------------------------------------------------  IN: 73.6 mL / OUT: 0 mL / NET: 73.6 mL        Appearance: Normal	  HEENT:   Normal oral mucosa, PERRL, EOMI	  Lymphatic: No lymphadenopathy  Cardiovascular: Normal S1 S2, No JVD, +harsh systolic 2/6 murmur loudest at Left sternal border . Also another blowing holosystolc murmur at apex 3/6  Respiratory: Lungs clear to auscultation	  Psychiatry: A & O x 3, Mood & affect appropriate  Gastrointestinal:  Soft, Non-tender, + BS	+ peg  Skin: No rashes, No ecchymoses, No cyanosis	+dialysis cathether at L chest  Neurologic: Non-focal  Extremities: Normal range of motion, No clubbing, cyanosis or edema  Vascular: Peripheral pulses palpable 2+ bilaterally        LABS:	 	    CBC Full  -  ( 14 Oct 2017 03:59 )  WBC Count : 14.8 K/uL  Hemoglobin : 11.0 g/dL  Hematocrit : 33.9 %  Platelet Count - Automated : 198 K/uL  Mean Cell Volume : 97.2 fl  Mean Cell Hemoglobin : 31.6 pg  Mean Cell Hemoglobin Concentration : 32.5 gm/dL  Auto Neutrophil # : 12.7 K/uL  Auto Lymphocyte # : 0.6 K/uL  Auto Monocyte # : 1.4 K/uL  Auto Eosinophil # : 0.0 K/uL  Auto Basophil # : 0.0 K/uL  Auto Neutrophil % : 85.9 %  Auto Lymphocyte % : 4.1 %  Auto Monocyte % : 9.6 %  Auto Eosinophil % : 0.3 %  Auto Basophil % : 0.2 %    10-14    141  |  102  |  36<H>  ----------------------------<  99  2.9<LL>   |  25  |  4.01<H>  10-13    145  |  105  |  78<H>  ----------------------------<  91  2.6<LL>   |  17<L>  |  6.81<H>    Ca    8.5      14 Oct 2017 03:59  Ca    8.8      13 Oct 2017 07:00  Phos  1.5     10-14  Phos  3.3     10-13  Mg     2.1     10-14  Mg     2.7     10-13    TPro  5.3<L>  /  Alb  2.8<L>  /  TBili  0.2  /  DBili  x   /  AST  34  /  ALT  22  /  AlkPhos  89  10-14    < from: Transthoracic Echocardiogram (10.13.17 @ 08:56) >  Dimensions:    Normal Values:  LA:     3.1    2.0 - 4.0 cm  Ao:     3.3    2.0 - 3.8 cm  SEPTUM: 1.3    0.6 - 1.2 cm  PWT:    0.9    0.6 - 1.1 cm  LVIDd:  4.1    3.0 - 5.6 cm  LVIDs:  2.2    1.8 - 4.0 cm  Derived variables:  LVMI: 98 g/m2  RWT: 0.43  Fractional short: 46 %  EF (Visual Estimate): 80-85 %  Doppler Peak Velocity (m/sec): AoV=3.4  ------------------------------------------------------------------------  Observations:  Mitral Valve: Mitral annular calcification. There is  systolic anterior motion of the mitral valve. Moderate,  eccentric, posteriorly-directed mitral regurgitation. Mean  transmitralvalve gradient equals 2 mm Hg.  Aortic Valve/Aorta: Aortic valve not well visualized;  appears to be a calcified trileaflet valve with decreased  opening. Measured peak transaortic valve gradient equals 47  mm Hg. Unable to assess degree of aortic stenosis due to  the presence of systolic anterior motion of the mitral  valve and elevated LVOT gradient. Visually, the aortic  valve appears at least moderately stenotic. Consider REDD to  evaluate the aortic valve if clinically indicated. No  aortic valve regurgitation seen. Peak left ventricular  outflow tract gradient equals 54 mm Hg, consistent with  moderately severe LVOT obstruction.  Aortic Root: 3.3 cm.  Left Atrium: Mildly dilated left atrium.  LA volume index =  39 cc/m2. Mobile interatrial septum.  Left Ventricle: Hyperdynamic left ventricle. Mild left  ventricular hypertrophy with basal septal bulge. Basal  septal thickness=1.3 cm. Mild diastolic dysfunction (Stage  I).  Right Heart: Normal right atrium. Normal right ventricular  size and function. Normal tricuspid valve. Mild-moderate  tricuspid regurgitation. Normal pulmonic valve.  Pericardium/Pleura: Normal pericardium with no pericardial  effusion.  Hemodynamic: Estimated right atrial pressure is 8 mm Hg.  Estimated right ventricular systolic pressure equals 44 mm  Hg, assuming right atrial pressure equals 8 mm Hg,  consistent with mild pulmonary hypertension.  ------------------------------------------------------------------------    < end of copied text >

## 2017-10-14 NOTE — PROGRESS NOTE ADULT - SUBJECTIVE AND OBJECTIVE BOX
CHIEF COMPLAINT: hypotension     Interval Events: pt hypotensive yesterday evening and overnight and continues to require phenylephrine     REVIEW OF SYSTEMS:  Constitutional:   Eyes:  ENT:  CV:  Resp:  GI:  :  MSK:  Integumentary:  Neurological:  Psychiatric:  Endocrine:  Hematologic/Lymphatic:  Allergic/Immunologic:  [ ] All other systems negative  [ ] Unable to assess ROS because ________    OBJECTIVE:  ICU Vital Signs Last 24 Hrs  T(C): 36.9 (14 Oct 2017 04:00), Max: 36.9 (14 Oct 2017 04:00)  T(F): 98.5 (14 Oct 2017 04:00), Max: 98.5 (14 Oct 2017 04:00)  HR: 63 (14 Oct 2017 06:45) (46 - 78)  BP: 103/53 (14 Oct 2017 06:45) (60/42 - 145/65)  BP(mean): 75 (14 Oct 2017 06:45) (47 - 99)  ABP: --  ABP(mean): --  RR: 26 (14 Oct 2017 06:45) (16 - 32)  SpO2: 95% (14 Oct 2017 06:45) (91% - 100%)        10-13 @ 07:01  -  10-14 @ 07:00  --------------------------------------------------------  IN: 1173.5 mL / OUT: 0 mL / NET: 1173.5 mL      CAPILLARY BLOOD GLUCOSE      POCT Blood Glucose.: 89 mg/dL (12 Oct 2017 22:19)      PHYSICAL EXAM:  General:   HEENT:   Lymph Nodes:  Neck:   Respiratory:   Cardiovascular:   Abdomen:   Extremities:   Skin:   Neurological:  Psychiatry:    LINES:    HOSPITAL MEDICATIONS:  MEDICATIONS  (STANDING):  acetylcysteine 10% Inhalation 5 milliLiter(s) Inhalation two times a day  aspirin  chewable 81 milliGRAM(s) Enteral Tube daily  brimonidine 0.2% Ophthalmic Solution 1 Drop(s) Both EYES three times a day  dorzolamide 2%/timolol 0.5% Ophthalmic Solution 1 Drop(s) Both EYES daily  heparin  Injectable 5000 Unit(s) SubCutaneous every 8 hours  influenza   Vaccine 0.5 milliLiter(s) IntraMuscular once  latanoprost 0.005% Ophthalmic Solution 1 Drop(s) Both EYES at bedtime  midodrine 10 milliGRAM(s) Oral three times a day  Nephro-asim 1 Tablet(s) Oral daily  pantoprazole   Suspension 40 milliGRAM(s) Oral before breakfast  phenylephrine    Infusion 1 MICROgram(s)/kG/Min (22.125 mL/Hr) IV Continuous <Continuous>  sevelamer carbonate Powder 1600 milliGRAM(s) Oral three times a day with meals  simvastatin 40 milliGRAM(s) Oral at bedtime    MEDICATIONS  (PRN):  acetaminophen    Suspension. 650 milliGRAM(s) Oral every 6 hours PRN pain  ALBUTerol/ipratropium for Nebulization 3 milliLiter(s) Nebulizer every 6 hours PRN Shortness of Breath and/or Wheezing  benzocaine 15 mG/menthol 3.6 mG Lozenge 1 Lozenge Oral every 6 hours PRN Sore Throat      LABS:                        11.0   14.8  )-----------( 198      ( 14 Oct 2017 03:59 )             33.9     10-14    141  |  102  |  36<H>  ----------------------------<  99  2.9<LL>   |  25  |  4.01<H>    Ca    8.5      14 Oct 2017 03:59  Phos  1.5     10-14  Mg     2.1     10-14    TPro  5.3<L>  /  Alb  2.8<L>  /  TBili  0.2  /  DBili  x   /  AST  34  /  ALT  22  /  AlkPhos  89  10-14    PT/INR - ( 12 Oct 2017 22:45 )   PT: 10.7 sec;   INR: 0.98 ratio         PTT - ( 12 Oct 2017 22:45 )  PTT:45.3 sec      Venous Blood Gas:  10-12 @ 23:32  7.23/55/49/22/75  VBG Lactate: 1.9  Venous Blood Gas:  10-12 @ 22:40  7.21/61/37/23/55  VBG Lactate: 3.3 CHIEF COMPLAINT: hypotension     Interval Events: pt hypotensive yesterday evening and overnight and continues to require phenylephrine. HD yesterday, with no net fluid removal. Pt feels well and has no acute concerns, she denies fevers/chills, headaches, shortness of breath, abdominal pain, nausea/vomiting and diarrhea. She reports her mid-sternal discomfort is improved and denies radiation of her pain, dizziness, lightheadedness and palpitations.     REVIEW OF SYSTEMS:  Constitutional: no fevers/chills, or fatigue   Eyes: no vision loss of vision changes  ENT:  CV:  Resp:  GI:  :  MSK:  Integumentary:  Neurological:  Psychiatric:  Endocrine:  Hematologic/Lymphatic:  Allergic/Immunologic:  [ ] All other systems negative  [ ] Unable to assess ROS because ________    OBJECTIVE:  ICU Vital Signs Last 24 Hrs  T(C): 36.9 (14 Oct 2017 04:00), Max: 36.9 (14 Oct 2017 04:00)  T(F): 98.5 (14 Oct 2017 04:00), Max: 98.5 (14 Oct 2017 04:00)  HR: 63 (14 Oct 2017 06:45) (46 - 78)  BP: 103/53 (14 Oct 2017 06:45) (60/42 - 145/65)  BP(mean): 75 (14 Oct 2017 06:45) (47 - 99)  ABP: --  ABP(mean): --  RR: 26 (14 Oct 2017 06:45) (16 - 32)  SpO2: 95% (14 Oct 2017 06:45) (91% - 100%)        10-13 @ 07:01  -  10-14 @ 07:00  --------------------------------------------------------  IN: 1173.5 mL / OUT: 0 mL / NET: 1173.5 mL      CAPILLARY BLOOD GLUCOSE      POCT Blood Glucose.: 89 mg/dL (12 Oct 2017 22:19)      PHYSICAL EXAM:  General:   HEENT:   Lymph Nodes:  Neck:   Respiratory:   Cardiovascular:   Abdomen:   Extremities:   Skin:   Neurological:  Psychiatry:    LINES:    HOSPITAL MEDICATIONS:  MEDICATIONS  (STANDING):  acetylcysteine 10% Inhalation 5 milliLiter(s) Inhalation two times a day  aspirin  chewable 81 milliGRAM(s) Enteral Tube daily  brimonidine 0.2% Ophthalmic Solution 1 Drop(s) Both EYES three times a day  dorzolamide 2%/timolol 0.5% Ophthalmic Solution 1 Drop(s) Both EYES daily  heparin  Injectable 5000 Unit(s) SubCutaneous every 8 hours  influenza   Vaccine 0.5 milliLiter(s) IntraMuscular once  latanoprost 0.005% Ophthalmic Solution 1 Drop(s) Both EYES at bedtime  midodrine 10 milliGRAM(s) Oral three times a day  Nephro-asim 1 Tablet(s) Oral daily  pantoprazole   Suspension 40 milliGRAM(s) Oral before breakfast  phenylephrine    Infusion 1 MICROgram(s)/kG/Min (22.125 mL/Hr) IV Continuous <Continuous>  sevelamer carbonate Powder 1600 milliGRAM(s) Oral three times a day with meals  simvastatin 40 milliGRAM(s) Oral at bedtime    MEDICATIONS  (PRN):  acetaminophen    Suspension. 650 milliGRAM(s) Oral every 6 hours PRN pain  ALBUTerol/ipratropium for Nebulization 3 milliLiter(s) Nebulizer every 6 hours PRN Shortness of Breath and/or Wheezing  benzocaine 15 mG/menthol 3.6 mG Lozenge 1 Lozenge Oral every 6 hours PRN Sore Throat      LABS:                        11.0   14.8  )-----------( 198      ( 14 Oct 2017 03:59 )             33.9     10-14    141  |  102  |  36<H>  ----------------------------<  99  2.9<LL>   |  25  |  4.01<H>    Ca    8.5      14 Oct 2017 03:59  Phos  1.5     10-14  Mg     2.1     10-14    TPro  5.3<L>  /  Alb  2.8<L>  /  TBili  0.2  /  DBili  x   /  AST  34  /  ALT  22  /  AlkPhos  89  10-14    PT/INR - ( 12 Oct 2017 22:45 )   PT: 10.7 sec;   INR: 0.98 ratio         PTT - ( 12 Oct 2017 22:45 )  PTT:45.3 sec      Venous Blood Gas:  10-12 @ 23:32  7.23/55/49/22/75  VBG Lactate: 1.9  Venous Blood Gas:  10-12 @ 22:40  7.21/61/37/23/55  VBG Lactate: 3.3 CHIEF COMPLAINT: hypotension     Interval Events: pt hypotensive yesterday evening and overnight and continues to require phenylephrine. HD yesterday, with no net fluid removal. Pt feels well and has no acute concerns, she denies fevers/chills, headaches, shortness of breath, abdominal pain, nausea/vomiting and diarrhea. She reports her mid-sternal discomfort is improved and denies radiation of her pain, dizziness, lightheadedness and palpitations.     REVIEW OF SYSTEMS:  Constitutional: no fevers/chills, or fatigue   Eyes: no vision loss of vision changes  ENT: BAKARI   CV: no chest pain or palpitations   Resp: + cough, increased secretions. No shortness of breath   GI: no nausea/vomiting or abdominal pain   : no flank pain   MSK: no joint pain   Integumentary: no rashes   Neurological: no headaches or focal weakness       OBJECTIVE:  ICU Vital Signs Last 24 Hrs  T(C): 36.9 (14 Oct 2017 04:00), Max: 36.9 (14 Oct 2017 04:00)  T(F): 98.5 (14 Oct 2017 04:00), Max: 98.5 (14 Oct 2017 04:00)  HR: 63 (14 Oct 2017 06:45) (46 - 78)  BP: 103/53 (14 Oct 2017 06:45) (60/42 - 145/65)  BP(mean): 75 (14 Oct 2017 06:45) (47 - 99)  ABP: --  ABP(mean): --  RR: 26 (14 Oct 2017 06:45) (16 - 32)  SpO2: 95% (14 Oct 2017 06:45) (91% - 100%)        10-13 @ 07:01  -  10-14 @ 07:00  --------------------------------------------------------  IN: 1173.5 mL / OUT: 0 mL / NET: 1173.5 mL      CAPILLARY BLOOD GLUCOSE      POCT Blood Glucose.: 89 mg/dL (12 Oct 2017 22:19)      PHYSICAL EXAM:  General: NAD  HEENT: BAKARI  Neck: L anterior chest dialysis catheter   Respiratory: CTA b/l   Cardiovascular: bradycardic, 2/6 systolic murmur at L sternal border   Abdomen: PEG in place, soft, nontender, nondistended  Extremities: No LE edema   Skin: no rashes  Neurological: AO x 3   Psychiatry: appropriate mood and affect     LINES: L central venous catheter     HOSPITAL MEDICATIONS:  MEDICATIONS  (STANDING):  acetylcysteine 10% Inhalation 5 milliLiter(s) Inhalation two times a day  aspirin  chewable 81 milliGRAM(s) Enteral Tube daily  brimonidine 0.2% Ophthalmic Solution 1 Drop(s) Both EYES three times a day  dorzolamide 2%/timolol 0.5% Ophthalmic Solution 1 Drop(s) Both EYES daily  heparin  Injectable 5000 Unit(s) SubCutaneous every 8 hours  influenza   Vaccine 0.5 milliLiter(s) IntraMuscular once  latanoprost 0.005% Ophthalmic Solution 1 Drop(s) Both EYES at bedtime  midodrine 10 milliGRAM(s) Oral three times a day  Nephro-saim 1 Tablet(s) Oral daily  pantoprazole   Suspension 40 milliGRAM(s) Oral before breakfast  phenylephrine    Infusion 1 MICROgram(s)/kG/Min (22.125 mL/Hr) IV Continuous <Continuous>  sevelamer carbonate Powder 1600 milliGRAM(s) Oral three times a day with meals  simvastatin 40 milliGRAM(s) Oral at bedtime    MEDICATIONS  (PRN):  acetaminophen    Suspension. 650 milliGRAM(s) Oral every 6 hours PRN pain  ALBUTerol/ipratropium for Nebulization 3 milliLiter(s) Nebulizer every 6 hours PRN Shortness of Breath and/or Wheezing  benzocaine 15 mG/menthol 3.6 mG Lozenge 1 Lozenge Oral every 6 hours PRN Sore Throat      LABS:                        11.0   14.8  )-----------( 198      ( 14 Oct 2017 03:59 )             33.9     10-14    141  |  102  |  36<H>  ----------------------------<  99  2.9<LL>   |  25  |  4.01<H>    Ca    8.5      14 Oct 2017 03:59  Phos  1.5     10-14  Mg     2.1     10-14    TPro  5.3<L>  /  Alb  2.8<L>  /  TBili  0.2  /  DBili  x   /  AST  34  /  ALT  22  /  AlkPhos  89  10-14    PT/INR - ( 12 Oct 2017 22:45 )   PT: 10.7 sec;   INR: 0.98 ratio         PTT - ( 12 Oct 2017 22:45 )  PTT:45.3 sec      Venous Blood Gas:  10-12 @ 23:32  7.23/55/49/22/75  VBG Lactate: 1.9  Venous Blood Gas:  10-12 @ 22:40  7.21/61/37/23/55  VBG Lactate: 3.3

## 2017-10-14 NOTE — PROGRESS NOTE ADULT - ASSESSMENT
83F w/ hx of ESRD (on HD Tues/Thurs/Sat), HTN, CVA (~20 years ago; s/p PEG w/ left hemiparesis/dysphagia at baseline), LVOT obstruction, HLD and GERD who presented to [place] w/ 1 week hx of sore throat/chest pain and transferred to [facility] after pt was found to have elevated troponins in the setting of missed HD w/ course c/b hypotension, bradycardia and syncopal event. Transferred to MICU on 10/13 for hypotension.      #Neuro  - pt at baseline mental status, A&O x 3    #Respiratory:   - no acute issues, supplemental oxygen as needed   - abx d/c yesterday as no evidence of pneumonia on bedside US     #Cardiac:   - cardiac enzymes elevated on admission likely 2/2 volume overload in the setting of missed HD. No further interventions needed as per cardiology.   - continue aspirin and statin   - Pt is requiring phenylephrine, will wean as tolerated   - TTE showing TRICE of MV, moderate MR, ?trileaflet AV, moderate LVOT obstruction, mild diastolic dysfunction, hyperdynamic LV (EF: 80-85%), and normal RV size and function   Plan for HD today   - continue midodrine standing   - continuous tele monitoring for any ectopy/bradycardia    #Renal:   - Last HD session yesterday, no volume removed. Pt became hypotensive and phenylephrine was started   - c/w current midodrine   - monitor BUN/electrolytes   - continue renvela     #GI  - PEG, c/w feeds  - dysphagia being evaluated by ENT, no clear source  - Continue Protonix and Pepcid   - CT a/p results pending     # ID  - no signs/symptoms of active infection, continue to monitor off abx  - blood cx from 10/12 w/ NGTD       Pt had RRT x 2 today. First rapid was for hypotension after HD, dialysis stopped, no fluid removed, SBP returned to 105s and pt transferred back to medical floors.    Pt now once again had rapid for hypotension 70/30s given total ~1 L fluids over 1.5 hours, on POCUS pt a-lined bilaterally and compressible IVC but given pt's renal status and minimal improvement, the pressor [place] was initiated given her LVOT obstruction and pt transferred to MICU. At the rapid, pt also had a falsely elevated K with (?) bradycardia. Albuterol and Kayexalate given but repeat VBG showed K of 2.4, so Kayexalate was removed via PEG.        ROS:  Constitutional: denies fevers, chills, night sweats, weight loss  HEENT: denies visual changes, hearing changes, rhinitis, odynophagia, or dysphagia  Cardiovascular: denies palpitations, chest pain, edema  Respiratory: denies SOB, wheezing  Gastrointestinal: denies N/V/D, abdominal pain, hematochezia, melena  : denies dysuria, hematuria  MSK: denies weakness, joint pain  Skin: denies new rashes or masses      PHYSICAL EXAM:  GENERAL: NAD, well-developed  HEAD: Atraumatic, Normocephalic  EYES: EOMI, PERRLA, conjunctiva and sclera clear  NECK: Supple, No JVD  CHEST/LUNG: Clear to auscultation bilaterally; No wheezes/rales/rhonchi  HEART: Regular rate and rhythm; No murmurs, rubs, or gallops  ABDOMEN: Soft, Nontender, Nondistended; Bowel sounds present. +PEG  EXTREMITIES:  2+ dP pulses b/l, No clubbing, cyanosis, or edema  PSYCH: reactive affect  NEUROLOGY: AAOx3, non-focal  SKIN: No rashes or lesions    10-12    140  |  99  |  81<H>  ----------------------------<  159<H>  2.6<LL>   |  18<L>  |  7.48<H>    Ca    11.4<H>      12 Oct 2017 23:28  Phos  3.5     10-12  Mg     3.3     10-12    TPro  8.1  /  Alb  4.1  /  TBili  0.3  /  DBili  x   /  AST  27  /  ALT  26  /  AlkPhos  122<H>  10-12                          14.4   19.4  )-----------( 229      ( 12 Oct 2017 22:45 )             43.7     LIVER FUNCTIONS - ( 12 Oct 2017 06:36 )  Alb: 4.1 g/dL / Pro: 8.1 g/dL / ALK PHOS: 122 U/L / ALT: 26 U/L RC / AST: 27 U/L / GGT: x           PT/INR - ( 12 Oct 2017 22:45 )   PT: 10.7 sec;   INR: 0.98 ratio         PTT - ( 12 Oct 2017 22:45 )  PTT:45.3 sec        A/P:  82 yo F with pmhx HLD, CVA, dysphagia s/p PEG, dCHF, LVOT obstruction, GERD, ESRD on HD requiring midodrine, p/w worsening cough for 10 days likely URI, transferred to [facility] for NSTEMI.    #Neuro  - pt at baseline, A&O x 3    #Resp  - at baseline, no O2 support needed  - pt broadened to azithro/aztreonam/vanc during rapid for URI /sepsis factors that may have contributed to HoTN  - duoneb    #Cardiac  - NSTEMI care as per cardiology (house)  - c/w aspirin  - pt currently off [place]  - trend troponins until downtrending  - TTE pending for eval of LVOT obstruction  - will likely need standing daily midodrine if HR maintained without any further episodes of bradycardia  - tele monitoring for any ectopy/bradycardia  - c/w statin    #Renal  - will speak with Renal in am regarding need of HD  - c/w current midodrine  - BMP qday for K/Phos/Mg  - sevelemer    #GI  - PEG, c/w feeds  - dysphagia being evaluated by ENT, no clear source  - PPI, pepcid    # ID  - will c/w broad abx coverage for now, consider downgrading tomorrow if afebrile  - BCx pending    #DVT ppx:   - HSQ 83F w/ hx of ESRD (on HD Tues/Thurs/Sat), HTN, CVA (~20 years ago; s/p PEG w/ left hemiparesis/dysphagia at baseline), LVOT obstruction, HLD and GERD who presented to [place] w/ 1 week hx of sore throat/chest pain and transferred to [facility] after pt was found to have elevated troponins in the setting of missed HD w/ course c/b hypotension, bradycardia and syncopal event. Transferred to MICU on 10/13 for hypotension.      #Neuro  - pt at baseline mental status, A&O x 3    #Respiratory:   - no acute issues, supplemental oxygen as needed   - abx d/c yesterday as no evidence of pneumonia on bedside US     #Cardiac:   - cardiac enzymes elevated on admission likely 2/2 volume overload in the setting of missed HD. No further interventions needed as per cardiology.   - continue aspirin and statin   - Pt is requiring phenylephrine, will wean as tolerated   - TTE showing TRICE of MV, moderate MR, ?trileaflet AV, moderate LVOT obstruction, mild diastolic dysfunction, hyperdynamic LV (EF: 80-85%), and normal RV size and function   Plan for HD today   - continue midodrine standing   - continuous tele monitoring for any ectopy/bradycardia    #Renal:   - Last HD session yesterday, no volume removed. Pt became hypotensive and phenylephrine was started   - c/w current midodrine   - monitor BUN/electrolytes   - hold renvela given hypophosphatemia     #GI  - PEG, c/w feeds  - dysphagia being evaluated by ENT, no clear source  - Continue Protonix and Pepcid   - CT a/p results pending     # ID  - no signs/symptoms of active infection, continue to monitor off abx  - blood cx from 10/12 w/ NGTD       Pt had RRT x 2 today. First rapid was for hypotension after HD, dialysis stopped, no fluid removed, SBP returned to 105s and pt transferred back to medical floors.    Pt now once again had rapid for hypotension 70/30s given total ~1 L fluids over 1.5 hours, on POCUS pt a-lined bilaterally and compressible IVC but given pt's renal status and minimal improvement, the pressor [place] was initiated given her LVOT obstruction and pt transferred to MICU. At the rapid, pt also had a falsely elevated K with (?) bradycardia. Albuterol and Kayexalate given but repeat VBG showed K of 2.4, so Kayexalate was removed via PEG.        ROS:  Constitutional: denies fevers, chills, night sweats, weight loss  HEENT: denies visual changes, hearing changes, rhinitis, odynophagia, or dysphagia  Cardiovascular: denies palpitations, chest pain, edema  Respiratory: denies SOB, wheezing  Gastrointestinal: denies N/V/D, abdominal pain, hematochezia, melena  : denies dysuria, hematuria  MSK: denies weakness, joint pain  Skin: denies new rashes or masses      PHYSICAL EXAM:  GENERAL: NAD, well-developed  HEAD: Atraumatic, Normocephalic  EYES: EOMI, PERRLA, conjunctiva and sclera clear  NECK: Supple, No JVD  CHEST/LUNG: Clear to auscultation bilaterally; No wheezes/rales/rhonchi  HEART: Regular rate and rhythm; No murmurs, rubs, or gallops  ABDOMEN: Soft, Nontender, Nondistended; Bowel sounds present. +PEG  EXTREMITIES:  2+ dP pulses b/l, No clubbing, cyanosis, or edema  PSYCH: reactive affect  NEUROLOGY: AAOx3, non-focal  SKIN: No rashes or lesions    10-12    140  |  99  |  81<H>  ----------------------------<  159<H>  2.6<LL>   |  18<L>  |  7.48<H>    Ca    11.4<H>      12 Oct 2017 23:28  Phos  3.5     10-12  Mg     3.3     10-12    TPro  8.1  /  Alb  4.1  /  TBili  0.3  /  DBili  x   /  AST  27  /  ALT  26  /  AlkPhos  122<H>  10-12                          14.4   19.4  )-----------( 229      ( 12 Oct 2017 22:45 )             43.7     LIVER FUNCTIONS - ( 12 Oct 2017 06:36 )  Alb: 4.1 g/dL / Pro: 8.1 g/dL / ALK PHOS: 122 U/L / ALT: 26 U/L RC / AST: 27 U/L / GGT: x           PT/INR - ( 12 Oct 2017 22:45 )   PT: 10.7 sec;   INR: 0.98 ratio         PTT - ( 12 Oct 2017 22:45 )  PTT:45.3 sec        A/P:  82 yo F with pmhx HLD, CVA, dysphagia s/p PEG, dCHF, LVOT obstruction, GERD, ESRD on HD requiring midodrine, p/w worsening cough for 10 days likely URI, transferred to [facility] for NSTEMI.    #Neuro  - pt at baseline, A&O x 3    #Resp  - at baseline, no O2 support needed  - pt broadened to azithro/aztreonam/vanc during rapid for URI /sepsis factors that may have contributed to HoTN  - duoneb    #Cardiac  - NSTEMI care as per cardiology (house)  - c/w aspirin  - pt currently off [place]  - trend troponins until downtrending  - TTE pending for eval of LVOT obstruction  - will likely need standing daily midodrine if HR maintained without any further episodes of bradycardia  - tele monitoring for any ectopy/bradycardia  - c/w statin    #Renal  - will speak with Renal in am regarding need of HD  - c/w current midodrine  - BMP qday for K/Phos/Mg  - sevelemer    #GI  - PEG, c/w feeds  - dysphagia being evaluated by ENT, no clear source  - PPI, pepcid    # ID  - will c/w broad abx coverage for now, consider downgrading tomorrow if afebrile  - BCx pending    #DVT ppx:   - HSQ 83F w/ hx of ESRD (on HD Tues/Thurs/Sat), HTN, CVA (~20 years ago; s/p PEG w/ left hemiparesis/dysphagia at baseline), LVOT obstruction, HLD and GERD who presented to [place] w/ 1 week hx of sore throat/chest pain and transferred to [facility] after pt was found to have elevated troponins in the setting of missed HD w/ course c/b hypotension, bradycardia and syncopal event. Transferred to MICU on 10/13 for hypotension, still requiring phenylephrine.      #Neuro  - pt at baseline mental status, A&O x 3    #Respiratory:   - no acute issues, supplemental oxygen as needed   - abx d/c yesterday as no evidence of pneumonia on bedside US   - f/cu CT chest    #Cardiac:   - cardiac enzymes elevated on admission likely 2/2 volume overload in the setting of missed HD. No further interventions needed as per cardiology.   - continue aspirin and statin   - Pt is requiring phenylephrine for hypotension, will wean as tolerated and continue midodrine 10mg Q8hr (although pt has been bradycardic)   - TTE showing TRICE of MV, moderate MR, ?trileaflet AV, moderate LVOT obstruction, mild diastolic dysfunction, hyperdynamic LV (EF: 80-85%), and normal RV size and function   Plan for HD today   - continuous tele monitoring   - f/u cardiology recs     #Renal:   - Last HD session yesterday, no volume removed. Pt became hypotensive and phenylephrine was started.   - c/w current midodrine   - monitor BUN/electrolytes   - hold renvela given hypophosphatemia   - no HD today as per renal     #GI  - pt s/p PEG, continue w/ feeds  - pt continues to have increased oral secretions, was seen and scoped by ENT and no significant abnormalities were appreciated. Will consult speech and swallow.   - Continue Protonix and Pepcid   - CT a/p results pending     # ID  - no signs/symptoms of active infection, continue to monitor off abx  - blood cx from 10/12 w/ NGTD       Pt had RRT x 2 today. First rapid was for hypotension after HD, dialysis stopped, no fluid removed, SBP returned to 105s and pt transferred back to medical floors.    Pt now once again had rapid for hypotension 70/30s given total ~1 L fluids over 1.5 hours, on POCUS pt a-lined bilaterally and compressible IVC but given pt's renal status and minimal improvement, the pressor [place] was initiated given her LVOT obstruction and pt transferred to MICU. At the rapid, pt also had a falsely elevated K with (?) bradycardia. Albuterol and Kayexalate given but repeat VBG showed K of 2.4, so Kayexalate was removed via PEG.        ROS:  Constitutional: denies fevers, chills, night sweats, weight loss  HEENT: denies visual changes, hearing changes, rhinitis, odynophagia, or dysphagia  Cardiovascular: denies palpitations, chest pain, edema  Respiratory: denies SOB, wheezing  Gastrointestinal: denies N/V/D, abdominal pain, hematochezia, melena  : denies dysuria, hematuria  MSK: denies weakness, joint pain  Skin: denies new rashes or masses      PHYSICAL EXAM:  GENERAL: NAD, well-developed  HEAD: Atraumatic, Normocephalic  EYES: EOMI, PERRLA, conjunctiva and sclera clear  NECK: Supple, No JVD  CHEST/LUNG: Clear to auscultation bilaterally; No wheezes/rales/rhonchi  HEART: Regular rate and rhythm; No murmurs, rubs, or gallops  ABDOMEN: Soft, Nontender, Nondistended; Bowel sounds present. +PEG  EXTREMITIES:  2+ dP pulses b/l, No clubbing, cyanosis, or edema  PSYCH: reactive affect  NEUROLOGY: AAOx3, non-focal  SKIN: No rashes or lesions    10-12    140  |  99  |  81<H>  ----------------------------<  159<H>  2.6<LL>   |  18<L>  |  7.48<H>    Ca    11.4<H>      12 Oct 2017 23:28  Phos  3.5     10-12  Mg     3.3     10-12    TPro  8.1  /  Alb  4.1  /  TBili  0.3  /  DBili  x   /  AST  27  /  ALT  26  /  AlkPhos  122<H>  10-12                          14.4   19.4  )-----------( 229      ( 12 Oct 2017 22:45 )             43.7     LIVER FUNCTIONS - ( 12 Oct 2017 06:36 )  Alb: 4.1 g/dL / Pro: 8.1 g/dL / ALK PHOS: 122 U/L / ALT: 26 U/L RC / AST: 27 U/L / GGT: x           PT/INR - ( 12 Oct 2017 22:45 )   PT: 10.7 sec;   INR: 0.98 ratio         PTT - ( 12 Oct 2017 22:45 )  PTT:45.3 sec        A/P:  82 yo F with pmhx HLD, CVA, dysphagia s/p PEG, dCHF, LVOT obstruction, GERD, ESRD on HD requiring midodrine, p/w worsening cough for 10 days likely URI, transferred to [facility] for NSTEMI.    #Neuro  - pt at baseline, A&O x 3    #Resp  - at baseline, no O2 support needed  - pt broadened to azithro/aztreonam/vanc during rapid for URI /sepsis factors that may have contributed to HoTN  - duoneb    #Cardiac  - NSTEMI care as per cardiology ([place])  - c/w aspirin  - pt currently off [place]  - trend troponins until downtrending  - TTE pending for eval of LVOT obstruction  - will likely need standing daily midodrine if HR maintained without any further episodes of bradycardia  - tele monitoring for any ectopy/bradycardia  - c/w statin    #Renal  - will speak with Renal in am regarding need of HD  - c/w current midodrine  - BMP qday for K/Phos/Mg  - sevelemer    #GI  - PEG, c/w feeds  - dysphagia being evaluated by ENT, no clear source  - PPI, pepcid    # ID  - will c/w broad abx coverage for now, consider downgrading tomorrow if afebrile  - BCx pending    #DVT ppx:   - HSQ

## 2017-10-15 LAB
ALBUMIN SERPL ELPH-MCNC: 2.9 G/DL — LOW (ref 3.3–5)
ALP SERPL-CCNC: 89 U/L — SIGNIFICANT CHANGE UP (ref 40–120)
ALT FLD-CCNC: 22 U/L RC — SIGNIFICANT CHANGE UP (ref 10–45)
ANION GAP SERPL CALC-SCNC: 15 MMOL/L — SIGNIFICANT CHANGE UP (ref 5–17)
AST SERPL-CCNC: 26 U/L — SIGNIFICANT CHANGE UP (ref 10–40)
BASOPHILS # BLD AUTO: 0.1 K/UL — SIGNIFICANT CHANGE UP (ref 0–0.2)
BASOPHILS NFR BLD AUTO: 0.5 % — SIGNIFICANT CHANGE UP (ref 0–2)
BILIRUB SERPL-MCNC: 0.2 MG/DL — SIGNIFICANT CHANGE UP (ref 0.2–1.2)
BUN SERPL-MCNC: 53 MG/DL — HIGH (ref 7–23)
CALCIUM SERPL-MCNC: 9.4 MG/DL — SIGNIFICANT CHANGE UP (ref 8.4–10.5)
CHLORIDE SERPL-SCNC: 105 MMOL/L — SIGNIFICANT CHANGE UP (ref 96–108)
CO2 SERPL-SCNC: 22 MMOL/L — SIGNIFICANT CHANGE UP (ref 22–31)
CREAT SERPL-MCNC: 5.42 MG/DL — HIGH (ref 0.5–1.3)
EOSINOPHIL # BLD AUTO: 0.2 K/UL — SIGNIFICANT CHANGE UP (ref 0–0.5)
EOSINOPHIL NFR BLD AUTO: 1.8 % — SIGNIFICANT CHANGE UP (ref 0–6)
GLUCOSE SERPL-MCNC: 124 MG/DL — HIGH (ref 70–99)
HCT VFR BLD CALC: 32.9 % — LOW (ref 34.5–45)
HGB BLD-MCNC: 11 G/DL — LOW (ref 11.5–15.5)
LYMPHOCYTES # BLD AUTO: 0.8 K/UL — LOW (ref 1–3.3)
LYMPHOCYTES # BLD AUTO: 6.7 % — LOW (ref 13–44)
MAGNESIUM SERPL-MCNC: 2.2 MG/DL — SIGNIFICANT CHANGE UP (ref 1.6–2.6)
MCHC RBC-ENTMCNC: 32.7 PG — SIGNIFICANT CHANGE UP (ref 27–34)
MCHC RBC-ENTMCNC: 33.4 GM/DL — SIGNIFICANT CHANGE UP (ref 32–36)
MCV RBC AUTO: 98 FL — SIGNIFICANT CHANGE UP (ref 80–100)
MONOCYTES # BLD AUTO: 1.1 K/UL — HIGH (ref 0–0.9)
MONOCYTES NFR BLD AUTO: 9.6 % — SIGNIFICANT CHANGE UP (ref 2–14)
NEUTROPHILS # BLD AUTO: 9.6 K/UL — HIGH (ref 1.8–7.4)
NEUTROPHILS NFR BLD AUTO: 81.3 % — HIGH (ref 43–77)
PHOSPHATE SERPL-MCNC: 3.5 MG/DL — SIGNIFICANT CHANGE UP (ref 2.5–4.5)
PLATELET # BLD AUTO: 174 K/UL — SIGNIFICANT CHANGE UP (ref 150–400)
POTASSIUM SERPL-MCNC: 3.8 MMOL/L — SIGNIFICANT CHANGE UP (ref 3.5–5.3)
POTASSIUM SERPL-SCNC: 3.8 MMOL/L — SIGNIFICANT CHANGE UP (ref 3.5–5.3)
PROT SERPL-MCNC: 5.6 G/DL — LOW (ref 6–8.3)
RBC # BLD: 3.36 M/UL — LOW (ref 3.8–5.2)
RBC # FLD: 16.8 % — HIGH (ref 10.3–14.5)
SODIUM SERPL-SCNC: 142 MMOL/L — SIGNIFICANT CHANGE UP (ref 135–145)
WBC # BLD: 11.8 K/UL — HIGH (ref 3.8–10.5)
WBC # FLD AUTO: 11.8 K/UL — HIGH (ref 3.8–10.5)

## 2017-10-15 PROCEDURE — 99233 SBSQ HOSP IP/OBS HIGH 50: CPT | Mod: GC

## 2017-10-15 RX ADMIN — PANTOPRAZOLE SODIUM 40 MILLIGRAM(S): 20 TABLET, DELAYED RELEASE ORAL at 06:06

## 2017-10-15 RX ADMIN — Medication 650 MILLIGRAM(S): at 18:00

## 2017-10-15 RX ADMIN — MIDODRINE HYDROCHLORIDE 10 MILLIGRAM(S): 2.5 TABLET ORAL at 14:17

## 2017-10-15 RX ADMIN — Medication 650 MILLIGRAM(S): at 03:30

## 2017-10-15 RX ADMIN — BRIMONIDINE TARTRATE 1 DROP(S): 2 SOLUTION/ DROPS OPHTHALMIC at 14:16

## 2017-10-15 RX ADMIN — Medication 650 MILLIGRAM(S): at 02:55

## 2017-10-15 RX ADMIN — Medication 1 TABLET(S): at 11:23

## 2017-10-15 RX ADMIN — Medication 650 MILLIGRAM(S): at 18:35

## 2017-10-15 RX ADMIN — HEPARIN SODIUM 5000 UNIT(S): 5000 INJECTION INTRAVENOUS; SUBCUTANEOUS at 21:51

## 2017-10-15 RX ADMIN — Medication 81 MILLIGRAM(S): at 11:24

## 2017-10-15 RX ADMIN — BENZOCAINE AND MENTHOL 1 LOZENGE: 5; 1 LIQUID ORAL at 07:00

## 2017-10-15 RX ADMIN — DORZOLAMIDE HYDROCHLORIDE TIMOLOL MALEATE 1 DROP(S): 20; 5 SOLUTION/ DROPS OPHTHALMIC at 11:23

## 2017-10-15 RX ADMIN — BRIMONIDINE TARTRATE 1 DROP(S): 2 SOLUTION/ DROPS OPHTHALMIC at 21:47

## 2017-10-15 RX ADMIN — HEPARIN SODIUM 5000 UNIT(S): 5000 INJECTION INTRAVENOUS; SUBCUTANEOUS at 14:17

## 2017-10-15 RX ADMIN — ROBINUL 0.4 MILLIGRAM(S): 0.2 INJECTION INTRAMUSCULAR; INTRAVENOUS at 11:00

## 2017-10-15 RX ADMIN — BENZOCAINE AND MENTHOL 1 LOZENGE: 5; 1 LIQUID ORAL at 20:35

## 2017-10-15 RX ADMIN — SIMVASTATIN 40 MILLIGRAM(S): 20 TABLET, FILM COATED ORAL at 22:19

## 2017-10-15 RX ADMIN — HEPARIN SODIUM 5000 UNIT(S): 5000 INJECTION INTRAVENOUS; SUBCUTANEOUS at 06:06

## 2017-10-15 RX ADMIN — Medication 650 MILLIGRAM(S): at 23:47

## 2017-10-15 RX ADMIN — MIDODRINE HYDROCHLORIDE 10 MILLIGRAM(S): 2.5 TABLET ORAL at 06:05

## 2017-10-15 RX ADMIN — Medication 650 MILLIGRAM(S): at 12:00

## 2017-10-15 RX ADMIN — LATANOPROST 1 DROP(S): 0.05 SOLUTION/ DROPS OPHTHALMIC; TOPICAL at 21:51

## 2017-10-15 RX ADMIN — BRIMONIDINE TARTRATE 1 DROP(S): 2 SOLUTION/ DROPS OPHTHALMIC at 06:06

## 2017-10-15 RX ADMIN — Medication 650 MILLIGRAM(S): at 11:00

## 2017-10-15 RX ADMIN — ROBINUL 0.4 MILLIGRAM(S): 0.2 INJECTION INTRAMUSCULAR; INTRAVENOUS at 17:00

## 2017-10-15 NOTE — PROGRESS NOTE ADULT - SUBJECTIVE AND OBJECTIVE BOX
Patient seen and examined  no complaints      MSG causes dizziness (Other)  pcn (Hives)  penicillin (Hives)    Hospital Medications:   MEDICATIONS  (STANDING):  aspirin  chewable 81 milliGRAM(s) Enteral Tube daily  brimonidine 0.2% Ophthalmic Solution 1 Drop(s) Both EYES three times a day  dorzolamide 2%/timolol 0.5% Ophthalmic Solution 1 Drop(s) Both EYES daily  heparin  Injectable 5000 Unit(s) SubCutaneous every 8 hours  influenza   Vaccine 0.5 milliLiter(s) IntraMuscular once  latanoprost 0.005% Ophthalmic Solution 1 Drop(s) Both EYES at bedtime  midodrine 10 milliGRAM(s) Oral three times a day  Nephro-asim 1 Tablet(s) Oral daily  pantoprazole   Suspension 40 milliGRAM(s) Oral before breakfast  phenylephrine    Infusion 1 MICROgram(s)/kG/Min (22.125 mL/Hr) IV Continuous <Continuous>  simvastatin 40 milliGRAM(s) Oral at bedtime        VITALS:  T(F): 97.9 (10-15-17 @ 08:00), Max: 98.2 (10-15-17 @ 00:00)  HR: 51 (10-15-17 @ 11:00)  BP: 140/67 (10-15-17 @ 11:00)  RR: 20 (10-15-17 @ 11:00)  SpO2: 98% (10-15-17 @ 11:00)  Wt(kg): --    10-14 @ 07:01  -  10-15 @ 07:00  --------------------------------------------------------  IN: 1372.6 mL / OUT: 0 mL / NET: 1372.6 mL    10-15 @ 07:01  -  10-15 @ 12:59  --------------------------------------------------------  IN: 180 mL / OUT: 0 mL / NET: 180 mL        Weight (kg): 50.6 (10-15 @ 03:00)  PHYSICAL EXAM:  Constitutional: confused  HEENT: anicteric sclera, oropharynx clear, MMM  Neck: No JVD  Respiratory: b/l rhonchi  Cardiovascular: S1, S2, RRR  Gastrointestinal: BS+, soft, NT/ND  Extremities: No cyanosis or clubbing. No peripheral edema  Vascular Access: left PC       LABS:  10-15    142  |  105  |  53<H>  ----------------------------<  124<H>  3.8   |  22  |  5.42<H>    Ca    9.4      15 Oct 2017 04:22  Phos  3.5     10-15  Mg     2.2     10-15    TPro  5.6<L>  /  Alb  2.9<L>  /  TBili  0.2  /  DBili      /  AST  26  /  ALT  22  /  AlkPhos  89  10-15    Creatinine Trend: 5.42 <--, 4.78 <--, 1.34 <--, 4.01 <--, 6.81 <--, 7.29 <--, 7.48 <--, 7.79 <--, 9.04 <--, 11.52 <--, 11.13 <--, 9.74 <--, 10.80 <--                        11.0   11.8  )-----------( 174      ( 15 Oct 2017 04:22 )             32.9     Urine Studies:      RADIOLOGY & ADDITIONAL STUDIES:

## 2017-10-15 NOTE — CHART NOTE - NSCHARTNOTEFT_GEN_A_CORE
MAR Accept Note    Briefly, this is an 84 y/o F hx ESRD on HD, HTN, CVA s/p PEG, LVOT obstruction, who presented with volume overload in the setting of missed hemodialysis. She was transferred to MICU on 10/13 after RRT for syncopal event with bradycardia and hypotension. These hemodynamics were thought to be secondary to LVOT obstruction. The patient was on phenylephrine whilst in the MICU in order to complete hemodialysis; phenylephrine was titrated off on 10/14. Presently, she complains of upper respiratory symptoms, which are improving since admission. She had CT C/A/P which did not reveal focal source of infection. Cardiology and nephrology have followed the patient while she was in the MICU. Please see transfer note for more detailed plan of care.     DAYLIN WEINBERG [id number]

## 2017-10-15 NOTE — PROGRESS NOTE ADULT - SUBJECTIVE AND OBJECTIVE BOX
CHIEF COMPLAINT: hypotension     Interval Events: No acute events overnight phenylephrine weaned off as of 9pm last night. Pt feels well and has no acute concerns this morning.     REVIEW OF SYSTEMS:  Constitutional:   Eyes:  ENT:  CV:  Resp:  GI:  :  MSK:  Integumentary:  Neurological:  Psychiatric:  Endocrine:  Hematologic/Lymphatic:  Allergic/Immunologic:  [ ] All other systems negative  [ ] Unable to assess ROS because ________    OBJECTIVE:  ICU Vital Signs Last 24 Hrs  T(C): 36.7 (15 Oct 2017 04:00), Max: 36.8 (15 Oct 2017 00:00)  T(F): 98 (15 Oct 2017 04:00), Max: 98.2 (15 Oct 2017 00:00)  HR: 62 (15 Oct 2017 07:00) (47 - 76)  BP: 117/56 (15 Oct 2017 07:00) (59/36 - 162/78)  BP(mean): 80 (15 Oct 2017 07:00) (43 - 112)  ABP: --  ABP(mean): --  RR: 26 (15 Oct 2017 07:00) (12 - 37)  SpO2: 97% (15 Oct 2017 07:00) (91% - 100%)        10-14 @ 07:01  -  10-15 @ 07:00  --------------------------------------------------------  IN: 1372.6 mL / OUT: 0 mL / NET: 1372.6 mL    POCT Blood Glucose.: 111 mg/dL (14 Oct 2017 17:37)    PHYSICAL EXAM:  General:   HEENT:   Lymph Nodes:  Neck:   Respiratory:   Cardiovascular:   Abdomen:   Extremities:   Skin:   Neurological:  Psychiatry:    LINES: L HD catheter     HOSPITAL MEDICATIONS:  MEDICATIONS  (STANDING):  aspirin  chewable 81 milliGRAM(s) Enteral Tube daily  brimonidine 0.2% Ophthalmic Solution 1 Drop(s) Both EYES three times a day  dorzolamide 2%/timolol 0.5% Ophthalmic Solution 1 Drop(s) Both EYES daily  heparin  Injectable 5000 Unit(s) SubCutaneous every 8 hours  influenza   Vaccine 0.5 milliLiter(s) IntraMuscular once  latanoprost 0.005% Ophthalmic Solution 1 Drop(s) Both EYES at bedtime  midodrine 10 milliGRAM(s) Oral three times a day  Nephro-asim 1 Tablet(s) Oral daily  pantoprazole   Suspension 40 milliGRAM(s) Oral before breakfast  phenylephrine    Infusion 1 MICROgram(s)/kG/Min (22.125 mL/Hr) IV Continuous <Continuous>  simvastatin 40 milliGRAM(s) Oral at bedtime    MEDICATIONS  (PRN):  acetaminophen    Suspension. 650 milliGRAM(s) Oral every 6 hours PRN pain  ALBUTerol/ipratropium for Nebulization 3 milliLiter(s) Nebulizer every 6 hours PRN Shortness of Breath and/or Wheezing  benzocaine 15 mG/menthol 3.6 mG Lozenge 1 Lozenge Oral every 6 hours PRN Sore Throat  glycopyrrolate Injectable 0.4 milliGRAM(s) IV Push every 6 hours PRN secretions      LABS:                        11.0   11.8  )-----------( 174      ( 15 Oct 2017 04:22 )             32.9     10-15    142  |  105  |  53<H>  ----------------------------<  124<H>  3.8   |  22  |  5.42<H>    Ca    9.4      15 Oct 2017 04:22  Phos  3.5     10-15  Mg     2.2     10-15    TPro  5.6<L>  /  Alb  2.9<L>  /  TBili  0.2  /  DBili  x   /  AST  26  /  ALT  22  /  AlkPhos  89  10-15 CHIEF COMPLAINT: hypotension     Interval Events: No acute events overnight phenylephrine weaned off as of 9pm last night. Pt feels well and has no acute concerns this morning.     REVIEW OF SYSTEMS:  Constitutional: no fevers/chills or fatigue  Eyes: no blurry vision or vision loss  ENT: no sinus symptoms or dysphagia   CV: no chest pain or palpitations   Resp: no cough or hemoptysis   GI: no abdominal pain or nausea/vomiting. + increased oral secretions   : no flank pain   MSK: no joint pain or muscle aches   Integumentary: no rashes   Neurological: no headaches or focal weakness     OBJECTIVE:  ICU Vital Signs Last 24 Hrs  T(C): 36.7 (15 Oct 2017 04:00), Max: 36.8 (15 Oct 2017 00:00)  T(F): 98 (15 Oct 2017 04:00), Max: 98.2 (15 Oct 2017 00:00)  HR: 62 (15 Oct 2017 07:00) (47 - 76)  BP: 117/56 (15 Oct 2017 07:00) (59/36 - 162/78)  BP(mean): 80 (15 Oct 2017 07:00) (43 - 112)  ABP: --  ABP(mean): --  RR: 26 (15 Oct 2017 07:00) (12 - 37)  SpO2: 97% (15 Oct 2017 07:00) (91% - 100%)        10-14 @ 07:01  -  10-15 @ 07:00  --------------------------------------------------------  IN: 1372.6 mL / OUT: 0 mL / NET: 1372.6 mL    POCT Blood Glucose.: 111 mg/dL (14 Oct 2017 17:37)    PHYSICAL EXAM:  General: NAD  HEENT: BAKARI   Neck: normal, supple, no JVD  Respiratory: cta b/l   Cardiovascular: S1, S2, RRR  Abdomen: soft, nontender, nondistended. PEG in place  Extremities: no LE edema   Skin: L anterior chest HD catheter   Neurological: A O x 3  Psychiatry: appropriate mood and affect     LINES: L HD catheter     HOSPITAL MEDICATIONS:  MEDICATIONS  (STANDING):  aspirin  chewable 81 milliGRAM(s) Enteral Tube daily  brimonidine 0.2% Ophthalmic Solution 1 Drop(s) Both EYES three times a day  dorzolamide 2%/timolol 0.5% Ophthalmic Solution 1 Drop(s) Both EYES daily  heparin  Injectable 5000 Unit(s) SubCutaneous every 8 hours  influenza   Vaccine 0.5 milliLiter(s) IntraMuscular once  latanoprost 0.005% Ophthalmic Solution 1 Drop(s) Both EYES at bedtime  midodrine 10 milliGRAM(s) Oral three times a day  Nephro-asim 1 Tablet(s) Oral daily  pantoprazole   Suspension 40 milliGRAM(s) Oral before breakfast  phenylephrine    Infusion 1 MICROgram(s)/kG/Min (22.125 mL/Hr) IV Continuous <Continuous>  simvastatin 40 milliGRAM(s) Oral at bedtime    MEDICATIONS  (PRN):  acetaminophen    Suspension. 650 milliGRAM(s) Oral every 6 hours PRN pain  ALBUTerol/ipratropium for Nebulization 3 milliLiter(s) Nebulizer every 6 hours PRN Shortness of Breath and/or Wheezing  benzocaine 15 mG/menthol 3.6 mG Lozenge 1 Lozenge Oral every 6 hours PRN Sore Throat  glycopyrrolate Injectable 0.4 milliGRAM(s) IV Push every 6 hours PRN secretions      LABS:                        11.0   11.8  )-----------( 174      ( 15 Oct 2017 04:22 )             32.9     10-15    142  |  105  |  53<H>  ----------------------------<  124<H>  3.8   |  22  |  5.42<H>    Ca    9.4      15 Oct 2017 04:22  Phos  3.5     10-15  Mg     2.2     10-15    TPro  5.6<L>  /  Alb  2.9<L>  /  TBili  0.2  /  DBili  x   /  AST  26  /  ALT  22  /  AlkPhos  89  10-15 CHIEF COMPLAINT: hypotension     Interval Events: No acute events overnight. Phenylephrine weaned off as of 9pm last night. Pt feels well and has no acute concerns this morning. HR 60s to 70s overnight.     REVIEW OF SYSTEMS:  Constitutional: no fevers/chills or fatigue  Eyes: no blurry vision or vision loss  ENT: no sinus symptoms or dysphagia   CV: no chest pain or palpitations   Resp: no cough or hemoptysis   GI: no abdominal pain or nausea/vomiting. + increased oral secretions   : no flank pain   MSK: no joint pain or muscle aches   Integumentary: no rashes   Neurological: no headaches or focal weakness     OBJECTIVE:  ICU Vital Signs Last 24 Hrs  T(C): 36.7 (15 Oct 2017 04:00), Max: 36.8 (15 Oct 2017 00:00)  T(F): 98 (15 Oct 2017 04:00), Max: 98.2 (15 Oct 2017 00:00)  HR: 62 (15 Oct 2017 07:00) (47 - 76)  BP: 117/56 (15 Oct 2017 07:00) (59/36 - 162/78)  BP(mean): 80 (15 Oct 2017 07:00) (43 - 112)  ABP: --  ABP(mean): --  RR: 26 (15 Oct 2017 07:00) (12 - 37)  SpO2: 97% (15 Oct 2017 07:00) (91% - 100%)        10-14 @ 07:01  -  10-15 @ 07:00  --------------------------------------------------------  IN: 1372.6 mL / OUT: 0 mL / NET: 1372.6 mL    POCT Blood Glucose.: 111 mg/dL (14 Oct 2017 17:37)    PHYSICAL EXAM:  General: NAD  HEENT: BAKARI   Neck: normal, supple, no JVD  Respiratory: cta b/l   Cardiovascular: S1, S2, RRR  Abdomen: soft, nontender, nondistended. PEG in place  Extremities: no LE edema   Skin: L anterior chest HD catheter   Neurological: A O x 3  Psychiatry: appropriate mood and affect     LINES: L HD catheter     HOSPITAL MEDICATIONS:  MEDICATIONS  (STANDING):  aspirin  chewable 81 milliGRAM(s) Enteral Tube daily  brimonidine 0.2% Ophthalmic Solution 1 Drop(s) Both EYES three times a day  dorzolamide 2%/timolol 0.5% Ophthalmic Solution 1 Drop(s) Both EYES daily  heparin  Injectable 5000 Unit(s) SubCutaneous every 8 hours  influenza   Vaccine 0.5 milliLiter(s) IntraMuscular once  latanoprost 0.005% Ophthalmic Solution 1 Drop(s) Both EYES at bedtime  midodrine 10 milliGRAM(s) Oral three times a day  Nephro-asim 1 Tablet(s) Oral daily  pantoprazole   Suspension 40 milliGRAM(s) Oral before breakfast  phenylephrine    Infusion 1 MICROgram(s)/kG/Min (22.125 mL/Hr) IV Continuous <Continuous>  simvastatin 40 milliGRAM(s) Oral at bedtime    MEDICATIONS  (PRN):  acetaminophen    Suspension. 650 milliGRAM(s) Oral every 6 hours PRN pain  ALBUTerol/ipratropium for Nebulization 3 milliLiter(s) Nebulizer every 6 hours PRN Shortness of Breath and/or Wheezing  benzocaine 15 mG/menthol 3.6 mG Lozenge 1 Lozenge Oral every 6 hours PRN Sore Throat  glycopyrrolate Injectable 0.4 milliGRAM(s) IV Push every 6 hours PRN secretions      LABS:                        11.0   11.8  )-----------( 174      ( 15 Oct 2017 04:22 )             32.9     10-15    142  |  105  |  53<H>  ----------------------------<  124<H>  3.8   |  22  |  5.42<H>    Ca    9.4      15 Oct 2017 04:22  Phos  3.5     10-15  Mg     2.2     10-15    TPro  5.6<L>  /  Alb  2.9<L>  /  TBili  0.2  /  DBili  x   /  AST  26  /  ALT  22  /  AlkPhos  89  10-15

## 2017-10-15 NOTE — PROGRESS NOTE ADULT - SUBJECTIVE AND OBJECTIVE BOX
24H hour events:  resting comfortably in bed    MEDICATIONS:  aspirin  chewable 81 milliGRAM(s) Enteral Tube daily  heparin  Injectable 5000 Unit(s) SubCutaneous every 8 hours  midodrine 10 milliGRAM(s) Oral three times a day  phenylephrine    Infusion 1 MICROgram(s)/kG/Min IV Continuous <Continuous>      ALBUTerol/ipratropium for Nebulization 3 milliLiter(s) Nebulizer every 6 hours PRN    acetaminophen    Suspension. 650 milliGRAM(s) Oral every 6 hours PRN    glycopyrrolate Injectable 0.4 milliGRAM(s) IV Push every 6 hours PRN  pantoprazole   Suspension 40 milliGRAM(s) Oral before breakfast    simvastatin 40 milliGRAM(s) Oral at bedtime    benzocaine 15 mG/menthol 3.6 mG Lozenge 1 Lozenge Oral every 6 hours PRN  brimonidine 0.2% Ophthalmic Solution 1 Drop(s) Both EYES three times a day  dorzolamide 2%/timolol 0.5% Ophthalmic Solution 1 Drop(s) Both EYES daily  influenza   Vaccine 0.5 milliLiter(s) IntraMuscular once  latanoprost 0.005% Ophthalmic Solution 1 Drop(s) Both EYES at bedtime  Nephro-asim 1 Tablet(s) Oral daily        PHYSICAL EXAM:  T(C): 36.7 (10-15-17 @ 04:00), Max: 36.8 (10-15-17 @ 00:00)  HR: 62 (10-15-17 @ 07:00) (47 - 76)  BP: 117/56 (10-15-17 @ 07:00) (59/36 - 162/78)  RR: 26 (10-15-17 @ 07:00) (12 - 37)  SpO2: 97% (10-15-17 @ 07:00) (91% - 100%)  Wt(kg): --  I&O's Summary    14 Oct 2017 07:01  -  15 Oct 2017 07:00  --------------------------------------------------------  IN: 1372.6 mL / OUT: 0 mL / NET: 1372.6 mL        Appearance: Normal	  HEENT:   Normal oral mucosa  Lymphatic: No lymphadenopathy  Cardiovascular: Normal S1 S2,         No JVD,      faint systolic mumur at apex,      No edema  Respiratory: Lungs clear to auscultation	  Psychiatry: Mood & affect appropriate  Gastrointestinal:  Soft, Non-tender	  Skin: No rashes, No ecchymoses, No cyanosis	  Neurologic: Non-focal  Extremities: Normal range of motion, No clubbing, cyanosis   Vascular: warm extremities        LABS:	 	    CBC Full  -  ( 15 Oct 2017 04:22 )  WBC Count : 11.8 K/uL  Hemoglobin : 11.0 g/dL  Hematocrit : 32.9 %  Platelet Count - Automated : 174 K/uL  Mean Cell Volume : 98.0 fl  Mean Cell Hemoglobin : 32.7 pg  Mean Cell Hemoglobin Concentration : 33.4 gm/dL  Auto Neutrophil # : 9.6 K/uL  Auto Lymphocyte # : 0.8 K/uL  Auto Monocyte # : 1.1 K/uL  Auto Eosinophil # : 0.2 K/uL  Auto Basophil # : 0.1 K/uL  Auto Neutrophil % : 81.3 %  Auto Lymphocyte % : 6.7 %  Auto Monocyte % : 9.6 %  Auto Eosinophil % : 1.8 %  Auto Basophil % : 0.5 %    10-15    142  |  105  |  53<H>  ----------------------------<  124<H>  3.8   |  22  |  5.42<H>  10-14    143  |  106  |  45<H>  ----------------------------<  129<H>  3.9   |  23  |  4.78<H>    Ca    9.4      15 Oct 2017 04:22  Ca    9.2      14 Oct 2017 17:45  Phos  3.5     10-15  Phos  3.4     10-14  Mg     2.2     10-15  Mg     2.2     10-14    TPro  5.6<L>  /  Alb  2.9<L>  /  TBili  0.2  /  DBili  x   /  AST  26  /  ALT  22  /  AlkPhos  89  10-15  TPro  5.3<L>  /  Alb  2.8<L>  /  TBili  0.2  /  DBili  x   /  AST  34  /  ALT  22  /  AlkPhos  89  10-14      proBNP:     TELEMETRY:  S 50-60 24H hour events:  resting comfortably in bed    REVIEW OF SYSTEMS:  Constitutional: No Fever, +Fatigued, Weight Changes  Eyes: No Recent Vision Changes, Eye Pain  ENT: No Congestion, Ear Pain, Sore Throat  Endocrine: No Excess Sweating, Temperature Intolerance  Cardiovascular: No Chest Pain, Palpitations, Shortness of Breath, Pre-syncope, Syncope, LE Edema  Respiratory: No Cough, Congestion, Wheezing  Gastrointestinal: No Abdominal Pain, Nausea, Vomiting  Genitourinary: No dysuria, hematuria  Musculoskeletal: No Joint Pain, Swelling  Neurologic: No headaches, Imbalance, Focal Deficits  Skin: No rashes, hematoma, purprura      MEDICATIONS:  aspirin  chewable 81 milliGRAM(s) Enteral Tube daily  heparin  Injectable 5000 Unit(s) SubCutaneous every 8 hours  midodrine 10 milliGRAM(s) Oral three times a day  phenylephrine    Infusion 1 MICROgram(s)/kG/Min IV Continuous <Continuous>      ALBUTerol/ipratropium for Nebulization 3 milliLiter(s) Nebulizer every 6 hours PRN    acetaminophen    Suspension. 650 milliGRAM(s) Oral every 6 hours PRN    glycopyrrolate Injectable 0.4 milliGRAM(s) IV Push every 6 hours PRN  pantoprazole   Suspension 40 milliGRAM(s) Oral before breakfast    simvastatin 40 milliGRAM(s) Oral at bedtime    benzocaine 15 mG/menthol 3.6 mG Lozenge 1 Lozenge Oral every 6 hours PRN  brimonidine 0.2% Ophthalmic Solution 1 Drop(s) Both EYES three times a day  dorzolamide 2%/timolol 0.5% Ophthalmic Solution 1 Drop(s) Both EYES daily  influenza   Vaccine 0.5 milliLiter(s) IntraMuscular once  latanoprost 0.005% Ophthalmic Solution 1 Drop(s) Both EYES at bedtime  Nephro-asim 1 Tablet(s) Oral daily        PHYSICAL EXAM:  T(C): 36.7 (10-15-17 @ 04:00), Max: 36.8 (10-15-17 @ 00:00)  HR: 62 (10-15-17 @ 07:00) (47 - 76)  BP: 117/56 (10-15-17 @ 07:00) (59/36 - 162/78)  RR: 26 (10-15-17 @ 07:00) (12 - 37)  SpO2: 97% (10-15-17 @ 07:00) (91% - 100%)  Wt(kg): --  I&O's Summary    14 Oct 2017 07:01  -  15 Oct 2017 07:00  --------------------------------------------------------  IN: 1372.6 mL / OUT: 0 mL / NET: 1372.6 mL        Appearance: Normal	  HEENT:   Normal oral mucosa  Lymphatic: No lymphadenopathy  Cardiovascular: Normal S1 S2,         No JVD,      faint systolic murmur at apex,      No edema  Respiratory: Lungs clear to auscultation	  Psychiatry: Mood & affect appropriate  Gastrointestinal:  Soft, Non-tender	  Skin: No rashes, No ecchymoses, No cyanosis	  Neurologic: Non-focal  Extremities: Normal range of motion, No clubbing, cyanosis   Vascular: warm extremities        LABS:	 	    CBC Full  -  ( 15 Oct 2017 04:22 )  WBC Count : 11.8 K/uL  Hemoglobin : 11.0 g/dL  Hematocrit : 32.9 %  Platelet Count - Automated : 174 K/uL  Mean Cell Volume : 98.0 fl  Mean Cell Hemoglobin : 32.7 pg  Mean Cell Hemoglobin Concentration : 33.4 gm/dL  Auto Neutrophil # : 9.6 K/uL  Auto Lymphocyte # : 0.8 K/uL  Auto Monocyte # : 1.1 K/uL  Auto Eosinophil # : 0.2 K/uL  Auto Basophil # : 0.1 K/uL  Auto Neutrophil % : 81.3 %  Auto Lymphocyte % : 6.7 %  Auto Monocyte % : 9.6 %  Auto Eosinophil % : 1.8 %  Auto Basophil % : 0.5 %    10-15    142  |  105  |  53<H>  ----------------------------<  124<H>  3.8   |  22  |  5.42<H>  10-14    143  |  106  |  45<H>  ----------------------------<  129<H>  3.9   |  23  |  4.78<H>    Ca    9.4      15 Oct 2017 04:22  Ca    9.2      14 Oct 2017 17:45  Phos  3.5     10-15  Phos  3.4     10-14  Mg     2.2     10-15  Mg     2.2     10-14    TPro  5.6<L>  /  Alb  2.9<L>  /  TBili  0.2  /  DBili  x   /  AST  26  /  ALT  22  /  AlkPhos  89  10-15  TPro  5.3<L>  /  Alb  2.8<L>  /  TBili  0.2  /  DBili  x   /  AST  34  /  ALT  22  /  AlkPhos  89  10-14      proBNP:     TELEMETRY:  S 50-60

## 2017-10-15 NOTE — CHART NOTE - NSCHARTNOTEFT_GEN_A_CORE
MICU Transfer Note    Transfer from: MICU    Transfer to: (  ) Medicine    (X ) Telemetry     (   ) RCU        (    ) Palliative         (   ) Stroke Unit          (   ) __________________    Accepting Physician: Dr. Marcus Peterson     MICU COURSE:    83F w/ hx of ESRD (on HD Tues/Thurs/Sat), HTN, CVA (~20 years ago; s/p PEG w/ left hemiparesis/dysphagia at baseline), LVOT obstruction, HLD and GERD who presented to [place] w/ 1 week hx of sore throat/chest pain and transferred to [facility] (10/12) after pt was found to have elevated troponins in the setting of missed HD w/ course c/b hypotension, bradycardia and syncopal event. Transferred to MICU on 10/13 after RRT was called for syncopal event. At that time, pt had a witnessed syncopal event while having a bowel movement, was found to be bradycardic to as low as 30s to 40s (sinus) and was hypotensive w/ an SBPs in 70s to 80s. Pt was started on phenylephrine and transferred to the MICU for further management. On 10/13, pt was given 20mg midodrine prior to HD (and 20mg in AM) and dialyzed w/ no net fluid removal. She became hypotensive towards the end of the session and was restarted on phenylephrine. Pt remained on seferino yesterday, was started on midodrine 10mg TID, given a 250cc bolus and eventually weaned off around 9pm last night.  Hypotension likely in the setting of moderate/severe LVOT obstruction and pt's bradycardia may be related to midodrine.     Plan/Follow Up:     CV:   - Hypotension: pt remains stable off phenylephrine as of last night. Continue midodrine 10mg TID. Pt given 250cc yesterday, no need for additional fluids now. Pt to have HD tomorrow and will need 20mg midodrine prior to HD (home dose is 15mg prior to HD).   - Bradycardia: ?2/2 midodrine. HR now 60s to 70s which is at goal    - f/u cardiology recs     ID:   - pt initially presented with URI symptoms and leukocytosis and was started on azithromycin. Abx were d/c on 10/13 as there was no evidence of infection on bedside US. CT chest/abdomen/pelvis did not show any evidence of infection, pt has remained afebrile and leukocytosis is resolving. Blood cx w/ NGTD. Continue to monitor off antibiotics.   - pt has considerable oral secretions, nonpurulent, which are unchanged from baseline. She was stated on Robinul for symptomatic control.     GI:   - GERD symptoms controlled w/ Protonix/Pecid   - pt MICU Transfer Note    Transfer from: MICU    Transfer to: (  ) Medicine    (X ) Telemetry     (   ) RCU        (    ) Palliative         (   ) Stroke Unit          (   ) __________________    Accepting Physician: Dr. Marcus Peterson     MICU COURSE:    83F w/ hx of ESRD (on HD Tues/Thurs/Sat), HTN, CVA (~20 years ago; s/p PEG w/ left hemiparesis/dysphagia at baseline), LVOT obstruction, HLD and GERD who presented to [place] w/ 1 week hx of sore throat/chest pain and transferred to [facility] (10/12) after pt was found to have elevated troponins in the setting of missed HD w/ course c/b hypotension, bradycardia and syncopal event. Transferred to MICU on 10/13 after RRT was called for syncopal event. At that time, pt had a witnessed syncopal event while having a bowel movement, was found to be bradycardic to as low as 30s to 40s (sinus) and was hypotensive w/ an SBPs in 70s to 80s. Pt was started on phenylephrine and transferred to the MICU for further management. On 10/13, pt was given 20mg midodrine prior to HD (and 20mg in AM) and dialyzed w/ no net fluid removal. She became hypotensive towards the end of the session and was restarted on phenylephrine. Pt remained on seferino yesterday, was started on midodrine 10mg TID, given a 250cc bolus and eventually weaned off around 9pm last night.  Hypotension likely in the setting of moderate/severe LVOT obstruction and pt's bradycardia may be related to midodrine.     Plan/Follow Up:     CV:   - Hypotension: pt remains stable off phenylephrine as of last night. Continue midodrine 10mg TID. Pt given 250cc yesterday, no need for additional fluids now. Pt to have HD tomorrow and will need 20mg midodrine prior to HD (home dose is 15mg prior to HD).   - Bradycardia: ?2/2 midodrine. HR now 60s to 70s which is at goal    - f/u cardiology recs     ID:   - pt initially presented with URI symptoms and leukocytosis and was started on azithromycin. Abx were d/c on 10/13 as there was no evidence of infection on bedside US. CT chest/abdomen/pelvis did not show any evidence of infection, pt has remained afebrile and leukocytosis is resolving. Blood cx w/ NGTD. Continue to monitor off antibiotics.   - pt has considerable oral secretions, nonpurulent, which are unchanged from baseline. She was stated on Robinul for symptomatic control.     GI:   - GERD symptoms controlled w/ Protonix/Pecid   - CT a/p w/out any acute intraabdominal process   - speech and swallow consulted     Renal:   - plan for HD tomorrow as per renal. Will likely need 20mg midodrine prior to HD.   - holding Renvela given low phos yesterday

## 2017-10-15 NOTE — PROGRESS NOTE ADULT - ASSESSMENT
83F w/ hx of ESRD (on HD Tues/Thurs/Sat), HTN, CVA (~20 years ago; s/p PEG w/ left hemiparesis/dysphagia at baseline), LVOT obstruction, HLD and GERD who presented to [place] w/ 1 week hx of sore throat/chest pain and transferred to [facility] after pt was found to have elevated troponins in the setting of missed HD w/ course c/b hypotension, bradycardia and syncopal event. Transferred to MICU on 10/13 for hypotension, now off phenylephrine overnight.      #Neuro  - pt at baseline mental status, A&O x 3    #Respiratory:   - no acute issues, supplemental oxygen as needed   - abx d/c yesterday as no evidence of pneumonia on bedside US or CT chest     #Cardiac:   - cardiac enzymes elevated on admission likely 2/2 volume overload in the setting of missed HD. No further interventions needed as per cardiology.   - continue aspirin and statin   - pt has been hypotensive in the setting of LVOT obstruction and has required phenyleprhine, which was weaned off overnight. Will continue w/ midodrine for pressor support. Pt given 250cc yesterday, will monitor fluid status closely and hold off on additional fluids for now.   - TTE showing TRICE of MV, moderate MR, ?trileaflet AV, moderate LVOT obstruction, mild diastolic dysfunction, hyperdynamic LV (EF: 80-85%), and normal RV size and function   - continuous tele monitoring   - f/u cardiology recs     #Renal:   - Last HD session on 10/13, no volume removed and session was ended early as pt required pressor support.   - c/w current midodrine   - monitor BUN/electrolytes   - holding Renvela given hypophosphatemia yesterday, will restart as needed   - no urgent indication for HD today, will f/u renal regarding next session (likely tomorrow)    #GI  - pt s/p PEG, continue w/ feeds  - pt continues to have increased oral secretions, was seen and scoped by ENT and no significant abnormalities were appreciated. Speech and swallow consulted.   - Continue Protonix and Pepcid   - CT a/p showed reflux of contrast from stomach into esophagus, suggesting that pt is high risk for recurrent aspiration. No acute intraabdominal process.     # ID  - no signs/symptoms of active infection, continue to monitor off abx. Leukocytosis resolving.   - blood cx from 10/12 w/ NGTD       Pt had RRT x 2 today. First rapid was for hypotension after HD, dialysis stopped, no fluid removed, SBP returned to 105s and pt transferred back to medical floors.    Pt now once again had rapid for hypotension 70/30s given total ~1 L fluids over 1.5 hours, on POCUS pt a-lined bilaterally and compressible IVC but given pt's renal status and minimal improvement, the pressor [place] was initiated given her LVOT obstruction and pt transferred to MICU. At the rapid, pt also had a falsely elevated K with (?) bradycardia. Albuterol and Kayexalate given but repeat VBG showed K of 2.4, so Kayexalate was removed via PEG.        ROS:  Constitutional: denies fevers, chills, night sweats, weight loss  HEENT: denies visual changes, hearing changes, rhinitis, odynophagia, or dysphagia  Cardiovascular: denies palpitations, chest pain, edema  Respiratory: denies SOB, wheezing  Gastrointestinal: denies N/V/D, abdominal pain, hematochezia, melena  : denies dysuria, hematuria  MSK: denies weakness, joint pain  Skin: denies new rashes or masses      PHYSICAL EXAM:  GENERAL: NAD, well-developed  HEAD: Atraumatic, Normocephalic  EYES: EOMI, PERRLA, conjunctiva and sclera clear  NECK: Supple, No JVD  CHEST/LUNG: Clear to auscultation bilaterally; No wheezes/rales/rhonchi  HEART: Regular rate and rhythm; No murmurs, rubs, or gallops  ABDOMEN: Soft, Nontender, Nondistended; Bowel sounds present. +PEG  EXTREMITIES:  2+ dP pulses b/l, No clubbing, cyanosis, or edema  PSYCH: reactive affect  NEUROLOGY: AAOx3, non-focal  SKIN: No rashes or lesions    10-12    140  |  99  |  81<H>  ----------------------------<  159<H>  2.6<LL>   |  18<L>  |  7.48<H>    Ca    11.4<H>      12 Oct 2017 23:28  Phos  3.5     10-12  Mg     3.3     10-12    TPro  8.1  /  Alb  4.1  /  TBili  0.3  /  DBili  x   /  AST  27  /  ALT  26  /  AlkPhos  122<H>  10-12                          14.4   19.4  )-----------( 229      ( 12 Oct 2017 22:45 )             43.7     LIVER FUNCTIONS - ( 12 Oct 2017 06:36 )  Alb: 4.1 g/dL / Pro: 8.1 g/dL / ALK PHOS: 122 U/L / ALT: 26 U/L RC / AST: 27 U/L / GGT: x           PT/INR - ( 12 Oct 2017 22:45 )   PT: 10.7 sec;   INR: 0.98 ratio         PTT - ( 12 Oct 2017 22:45 )  PTT:45.3 sec        A/P:  84 yo F with pmhx HLD, CVA, dysphagia s/p PEG, dCHF, LVOT obstruction, GERD, ESRD on HD requiring midodrine, p/w worsening cough for 10 days likely URI, transferred to [facility] for NSTEMI.    #Neuro  - pt at baseline, A&O x 3    #Resp  - at baseline, no O2 support needed  - pt broadened to azithro/aztreonam/vanc during rapid for URI /sepsis factors that may have contributed to HoTN  - duoneb    #Cardiac  - NSTEMI care as per cardiology (house)  - c/w aspirin  - pt currently off Chap[place]  - trend troponins until downtrending  - TTE pending for eval of LVOT obstruction  - will likely need standing daily midodrine if HR maintained without any further episodes of bradycardia  - tele monitoring for any ectopy/bradycardia  - c/w statin    #Renal  - will speak with Renal in am regarding need of HD  - c/w current midodrine  - BMP qday for K/Phos/Mg  - sevelemer    #GI  - PEG, c/w feeds  - dysphagia being evaluated by ENT, no clear source  - PPI, pepcid    # ID  - will c/w broad abx coverage for now, consider downgrading tomorrow if afebrile  - BCx pending    #DVT ppx:   - HSQ 83F w/ hx of ESRD (on HD Tues/Thurs/Sat), HTN, CVA (~20 years ago; s/p PEG w/ left hemiparesis/dysphagia at baseline), LVOT obstruction, HLD and GERD who presented to [place] w/ 1 week hx of sore throat/chest pain and transferred to [facility] after pt was found to have elevated troponins in the setting of missed HD w/ course c/b hypotension, bradycardia and syncopal event. Transferred to MICU on 10/13 for hypotension, now off phenylephrine overnight.      #Neuro  - pt at baseline mental status, A&O x 3    #Respiratory:   - no acute issues, supplemental oxygen as needed   - abx d/c yesterday as no evidence of pneumonia on bedside US or CT chest     #Cardiac:   - cardiac enzymes elevated on admission likely 2/2 volume overload in the setting of missed HD. No further interventions needed as per cardiology.   - continue aspirin and statin   - pt has been hypotensive in the setting of LVOT obstruction and has required phenyleprhine (weaned off overnight). Will continue w/ midodrine for pressor support. Pt given 250cc yesterday, will monitor fluid status closely and hold off on additional fluids for now.   - Pt initially bradycardic (?2/2 midodrine), now stable 60s-70s which is optimal  - TTE showing TRICE of MV, moderate MR, ?trileaflet AV, moderate LVOT obstruction, mild diastolic dysfunction, hyperdynamic LV (EF: 80-85%), and normal RV size and function   - continuous tele monitoring   - f/u cardiology recs     #Renal:   - Last HD session on 10/13, no volume was removed and session was ended early as pt required pressor support.   - c/w current midodrine 10 TID  - monitor BUN/electrolytes   - holding Renvela given hypophosphatemia yesterday, will restart as needed   - no urgent indication for HD today, will f/u renal regarding next session (likely tomorrow)    #GI  - pt s/p PEG, continue w/ feeds  - pt continues to have increased oral secretions, was seen and scoped by ENT and no significant abnormalities were appreciated. Speech and swallow consulted.   - Continue Protonix and Pepcid   - CT a/p showed reflux of contrast from stomach into esophagus, suggesting that pt is high risk for recurrent aspiration. No acute intraabdominal process.     # ID  - no signs/symptoms of active infection, continue to monitor off abx. Leukocytosis resolving.   - blood cx from 10/12 w/ NGTD       Pt had RRT x 2 today. First rapid was for hypotension after HD, dialysis stopped, no fluid removed, SBP returned to 105s and pt transferred back to medical floors.    Pt now once again had rapid for hypotension 70/30s given total ~1 L fluids over 1.5 hours, on POCUS pt a-lined bilaterally and compressible IVC but given pt's renal status and minimal improvement, the pressor Chap[place] was initiated given her LVOT obstruction and pt transferred to MICU. At the rapid, pt also had a falsely elevated K with (?) bradycardia. Albuterol and Kayexalate given but repeat VBG showed K of 2.4, so Kayexalate was removed via PEG.        ROS:  Constitutional: denies fevers, chills, night sweats, weight loss  HEENT: denies visual changes, hearing changes, rhinitis, odynophagia, or dysphagia  Cardiovascular: denies palpitations, chest pain, edema  Respiratory: denies SOB, wheezing  Gastrointestinal: denies N/V/D, abdominal pain, hematochezia, melena  : denies dysuria, hematuria  MSK: denies weakness, joint pain  Skin: denies new rashes or masses      PHYSICAL EXAM:  GENERAL: NAD, well-developed  HEAD: Atraumatic, Normocephalic  EYES: EOMI, PERRLA, conjunctiva and sclera clear  NECK: Supple, No JVD  CHEST/LUNG: Clear to auscultation bilaterally; No wheezes/rales/rhonchi  HEART: Regular rate and rhythm; No murmurs, rubs, or gallops  ABDOMEN: Soft, Nontender, Nondistended; Bowel sounds present. +PEG  EXTREMITIES:  2+ dP pulses b/l, No clubbing, cyanosis, or edema  PSYCH: reactive affect  NEUROLOGY: AAOx3, non-focal  SKIN: No rashes or lesions    10-12    140  |  99  |  81<H>  ----------------------------<  159<H>  2.6<LL>   |  18<L>  |  7.48<H>    Ca    11.4<H>      12 Oct 2017 23:28  Phos  3.5     10-12  Mg     3.3     10-12    TPro  8.1  /  Alb  4.1  /  TBili  0.3  /  DBili  x   /  AST  27  /  ALT  26  /  AlkPhos  122<H>  10-12                          14.4   19.4  )-----------( 229      ( 12 Oct 2017 22:45 )             43.7     LIVER FUNCTIONS - ( 12 Oct 2017 06:36 )  Alb: 4.1 g/dL / Pro: 8.1 g/dL / ALK PHOS: 122 U/L / ALT: 26 U/L RC / AST: 27 U/L / GGT: x           PT/INR - ( 12 Oct 2017 22:45 )   PT: 10.7 sec;   INR: 0.98 ratio         PTT - ( 12 Oct 2017 22:45 )  PTT:45.3 sec        A/P:  82 yo F with pmhx HLD, CVA, dysphagia s/p PEG, dCHF, LVOT obstruction, GERD, ESRD on HD requiring midodrine, p/w worsening cough for 10 days likely URI, transferred to [facility] for NSTEMI.    #Neuro  - pt at baseline, A&O x 3    #Resp  - at baseline, no O2 support needed  - pt broadened to azithro/aztreonam/vanc during rapid for URI /sepsis factors that may have contributed to HoTN  - duoneb    #Cardiac  - NSTEMI care as per cardiology ([place])  - c/w aspirin  - pt currently off Chapincito  - trend troponins until downtrending  - TTE pending for eval of LVOT obstruction  - will likely need standing daily midodrine if HR maintained without any further episodes of bradycardia  - tele monitoring for any ectopy/bradycardia  - c/w statin    #Renal  - will speak with Renal in am regarding need of HD  - c/w current midodrine  - BMP qday for K/Phos/Mg  - sevelemer    #GI  - PEG, c/w feeds  - dysphagia being evaluated by ENT, no clear source  - PPI, pepcid    # ID  - will c/w broad abx coverage for now, consider downgrading tomorrow if afebrile  - BCx pending    #DVT ppx:   - HSQ 83F w/ hx of ESRD (on HD Tues/Thurs/Sat), HTN, CVA (~20 years ago; s/p PEG w/ left hemiparesis/dysphagia at baseline), LVOT obstruction, HLD and GERD who presented to [place] w/ 1 week hx of sore throat/chest pain and transferred to [facility] after pt was found to have elevated troponins in the setting of missed HD w/ course c/b hypotension, bradycardia and syncopal event. Transferred to MICU on 10/13 for hypotension, now off phenylephrine overnight.      #Neuro  - pt at baseline mental status, A&O x 3    #Respiratory:   - no acute issues, supplemental oxygen as needed   - abx d/c on 10/13 as no evidence of pneumonia on bedside US or CT chest. URI symptoms have resolved.     #Cardiac:   - cardiac enzymes elevated on admission likely 2/2 volume overload in the setting of missed HD. No further interventions needed as per cardiology.   - continue aspirin and statin   - pt has been hypotensive in the setting of LVOT obstruction and has required phenyleprhine (weaned off overnight). Will continue w/ midodrine 10mg TID for pressor support. Pt given 250cc yesterday, will monitor fluid status closely and hold off on additional fluids for now.   - Pt initially bradycardic (?2/2 midodrine), now stable 60s-70s which is optimal  - TTE showing TRICE of MV, moderate MR, ?trileaflet AV, moderate LVOT obstruction, mild diastolic dysfunction, hyperdynamic LV (EF: 80-85%), and normal RV size and function   - continuous tele monitoring   - f/u cardiology recs     #Renal:   - Last HD session on 10/13, no volume was removed and session was ended early as pt required pressor support.   - c/w current midodrine 10 TID  - monitor BUN/electrolytes   - holding Renvela given hypophosphatemia yesterday, will restart as needed   - no urgent indication for HD today, will f/u renal regarding next session (likely tomorrow)    #GI  - pt s/p PEG, continue w/ feeds  - pt continues to have increased oral secretions, was seen and scoped by ENT and no significant abnormalities were appreciated. Speech and swallow consulted.   - Continue Protonix and Pepcid   - CT a/p showed reflux of contrast from stomach into esophagus, suggesting that pt is high risk for recurrent aspiration. No acute intraabdominal process.     # ID  - no signs/symptoms of active infection, continue to monitor off abx. Leukocytosis resolving.   - blood cx from 10/12 w/ NGTD   - CT chest/abdomen/pelvis without evidence of infection       Pt had RRT x 2 today. First rapid was for hypotension after HD, dialysis stopped, no fluid removed, SBP returned to 105s and pt transferred back to medical floors.    Pt now once again had rapid for hypotension 70/30s given total ~1 L fluids over 1.5 hours, on POCUS pt a-lined bilaterally and compressible IVC but given pt's renal status and minimal improvement, the pressor Chapincito was initiated given her LVOT obstruction and pt transferred to MICU. At the rapid, pt also had a falsely elevated K with (?) bradycardia. Albuterol and Kayexalate given but repeat VBG showed K of 2.4, so Kayexalate was removed via PEG.        ROS:  Constitutional: denies fevers, chills, night sweats, weight loss  HEENT: denies visual changes, hearing changes, rhinitis, odynophagia, or dysphagia  Cardiovascular: denies palpitations, chest pain, edema  Respiratory: denies SOB, wheezing  Gastrointestinal: denies N/V/D, abdominal pain, hematochezia, melena  : denies dysuria, hematuria  MSK: denies weakness, joint pain  Skin: denies new rashes or masses      PHYSICAL EXAM:  GENERAL: NAD, well-developed  HEAD: Atraumatic, Normocephalic  EYES: EOMI, PERRLA, conjunctiva and sclera clear  NECK: Supple, No JVD  CHEST/LUNG: Clear to auscultation bilaterally; No wheezes/rales/rhonchi  HEART: Regular rate and rhythm; No murmurs, rubs, or gallops  ABDOMEN: Soft, Nontender, Nondistended; Bowel sounds present. +PEG  EXTREMITIES:  2+ dP pulses b/l, No clubbing, cyanosis, or edema  PSYCH: reactive affect  NEUROLOGY: AAOx3, non-focal  SKIN: No rashes or lesions    10-12    140  |  99  |  81<H>  ----------------------------<  159<H>  2.6<LL>   |  18<L>  |  7.48<H>    Ca    11.4<H>      12 Oct 2017 23:28  Phos  3.5     10-12  Mg     3.3     10-12    TPro  8.1  /  Alb  4.1  /  TBili  0.3  /  DBili  x   /  AST  27  /  ALT  26  /  AlkPhos  122<H>  10-12                          14.4   19.4  )-----------( 229      ( 12 Oct 2017 22:45 )             43.7     LIVER FUNCTIONS - ( 12 Oct 2017 06:36 )  Alb: 4.1 g/dL / Pro: 8.1 g/dL / ALK PHOS: 122 U/L / ALT: 26 U/L RC / AST: 27 U/L / GGT: x           PT/INR - ( 12 Oct 2017 22:45 )   PT: 10.7 sec;   INR: 0.98 ratio         PTT - ( 12 Oct 2017 22:45 )  PTT:45.3 sec        A/P:  82 yo F with pmhx HLD, CVA, dysphagia s/p PEG, dCHF, LVOT obstruction, GERD, ESRD on HD requiring midodrine, p/w worsening cough for 10 days likely URI, transferred to [facility] for NSTEMI.    #Neuro  - pt at baseline, A&O x 3    #Resp  - at baseline, no O2 support needed  - pt broadened to azithro/aztreonam/vanc during rapid for URI /sepsis factors that may have contributed to HoTN  - duoneb    #Cardiac  - NSTEMI care as per cardiology (house)  - c/w aspirin  - pt currently off Chapincito  - trend troponins until downtrending  - TTE pending for eval of LVOT obstruction  - will likely need standing daily midodrine if HR maintained without any further episodes of bradycardia  - tele monitoring for any ectopy/bradycardia  - c/w statin    #Renal  - will speak with Renal in am regarding need of HD  - c/w current midodrine  - BMP qday for K/Phos/Mg  - sevelemer    #GI  - PEG, c/w feeds  - dysphagia being evaluated by ENT, no clear source  - PPI, pepcid    # ID  - will c/w broad abx coverage for now, consider downgrading tomorrow if afebrile  - BCx pending    #DVT ppx:   - HSQ

## 2017-10-15 NOTE — PROGRESS NOTE ADULT - ASSESSMENT
83F c hx HLD, CVA c/b dysphagia s/p PEG, grade 1 DHF, LVOT obstruction, GERD, ESRD (T/Th/Sat via left IJ tunneled dialysis cath), left arm vascular stent, hypotension requiring midodrine, presented to [place] for worsening cough for 10 days, transferred to [facility] for NSTEMI mgmt. c/c/b hypotension/bradycardia

## 2017-10-15 NOTE — PROGRESS NOTE ADULT - ASSESSMENT
83F w/ history ESRD, LVOT obstruction/LVH. Admitted for volume overload, then became intravascularly depleted. Cardiology consulted for complex hemodynamics (LVOT grad=54, moderate AS, +TRICE)      #LVOT obstruction with TRICE with hyperdynamic LV- LV on TTE appears underfilled and clinically appears dry.  - continue to maintain low heart rate.   - dry/euvolemic on exam  - prn phenylephrine for hypotension, prn IVF    [id number]

## 2017-10-16 DIAGNOSIS — K21.9 GASTRO-ESOPHAGEAL REFLUX DISEASE WITHOUT ESOPHAGITIS: ICD-10-CM

## 2017-10-16 DIAGNOSIS — I95.3 HYPOTENSION OF HEMODIALYSIS: ICD-10-CM

## 2017-10-16 LAB
ALBUMIN SERPL ELPH-MCNC: 3.4 G/DL — SIGNIFICANT CHANGE UP (ref 3.3–5)
ALP SERPL-CCNC: 99 U/L — SIGNIFICANT CHANGE UP (ref 40–120)
ALT FLD-CCNC: 24 U/L RC — SIGNIFICANT CHANGE UP (ref 10–45)
ANION GAP SERPL CALC-SCNC: 21 MMOL/L — HIGH (ref 5–17)
AST SERPL-CCNC: 27 U/L — SIGNIFICANT CHANGE UP (ref 10–40)
BILIRUB SERPL-MCNC: 0.2 MG/DL — SIGNIFICANT CHANGE UP (ref 0.2–1.2)
BUN SERPL-MCNC: 78 MG/DL — HIGH (ref 7–23)
CALCIUM SERPL-MCNC: 10.4 MG/DL — SIGNIFICANT CHANGE UP (ref 8.4–10.5)
CHLORIDE SERPL-SCNC: 101 MMOL/L — SIGNIFICANT CHANGE UP (ref 96–108)
CO2 SERPL-SCNC: 23 MMOL/L — SIGNIFICANT CHANGE UP (ref 22–31)
CREAT SERPL-MCNC: 7.22 MG/DL — HIGH (ref 0.5–1.3)
GLUCOSE SERPL-MCNC: 108 MG/DL — HIGH (ref 70–99)
HCT VFR BLD CALC: 35.4 % — SIGNIFICANT CHANGE UP (ref 34.5–45)
HGB BLD-MCNC: 11.6 G/DL — SIGNIFICANT CHANGE UP (ref 11.5–15.5)
MAGNESIUM SERPL-MCNC: 2.6 MG/DL — SIGNIFICANT CHANGE UP (ref 1.6–2.6)
MCHC RBC-ENTMCNC: 32.5 PG — SIGNIFICANT CHANGE UP (ref 27–34)
MCHC RBC-ENTMCNC: 32.9 GM/DL — SIGNIFICANT CHANGE UP (ref 32–36)
MCV RBC AUTO: 98.9 FL — SIGNIFICANT CHANGE UP (ref 80–100)
PHOSPHATE SERPL-MCNC: 4.3 MG/DL — SIGNIFICANT CHANGE UP (ref 2.5–4.5)
PLATELET # BLD AUTO: 222 K/UL — SIGNIFICANT CHANGE UP (ref 150–400)
POTASSIUM SERPL-MCNC: 4.5 MMOL/L — SIGNIFICANT CHANGE UP (ref 3.5–5.3)
POTASSIUM SERPL-SCNC: 4.5 MMOL/L — SIGNIFICANT CHANGE UP (ref 3.5–5.3)
PROCALCITONIN SERPL-MCNC: 0.95 NG/ML — HIGH (ref 0–0.04)
PROT SERPL-MCNC: 6.2 G/DL — SIGNIFICANT CHANGE UP (ref 6–8.3)
RBC # BLD: 3.58 M/UL — LOW (ref 3.8–5.2)
RBC # FLD: 17 % — HIGH (ref 10.3–14.5)
SODIUM SERPL-SCNC: 145 MMOL/L — SIGNIFICANT CHANGE UP (ref 135–145)
WBC # BLD: 13.5 K/UL — HIGH (ref 3.8–10.5)
WBC # FLD AUTO: 13.5 K/UL — HIGH (ref 3.8–10.5)

## 2017-10-16 PROCEDURE — 99233 SBSQ HOSP IP/OBS HIGH 50: CPT

## 2017-10-16 PROCEDURE — 99232 SBSQ HOSP IP/OBS MODERATE 35: CPT | Mod: GC

## 2017-10-16 RX ORDER — ONDANSETRON 8 MG/1
4 TABLET, FILM COATED ORAL EVERY 8 HOURS
Qty: 0 | Refills: 0 | Status: COMPLETED | OUTPATIENT
Start: 2017-10-16 | End: 2017-10-16

## 2017-10-16 RX ORDER — MIDODRINE HYDROCHLORIDE 2.5 MG/1
10 TABLET ORAL THREE TIMES A DAY
Qty: 0 | Refills: 0 | Status: DISCONTINUED | OUTPATIENT
Start: 2017-10-16 | End: 2017-10-22

## 2017-10-16 RX ORDER — MIDODRINE HYDROCHLORIDE 2.5 MG/1
10 TABLET ORAL THREE TIMES A DAY
Qty: 0 | Refills: 0 | Status: DISCONTINUED | OUTPATIENT
Start: 2017-10-16 | End: 2017-10-16

## 2017-10-16 RX ADMIN — LATANOPROST 1 DROP(S): 0.05 SOLUTION/ DROPS OPHTHALMIC; TOPICAL at 21:45

## 2017-10-16 RX ADMIN — BRIMONIDINE TARTRATE 1 DROP(S): 2 SOLUTION/ DROPS OPHTHALMIC at 21:45

## 2017-10-16 RX ADMIN — MIDODRINE HYDROCHLORIDE 10 MILLIGRAM(S): 2.5 TABLET ORAL at 15:53

## 2017-10-16 RX ADMIN — DORZOLAMIDE HYDROCHLORIDE TIMOLOL MALEATE 1 DROP(S): 20; 5 SOLUTION/ DROPS OPHTHALMIC at 13:36

## 2017-10-16 RX ADMIN — Medication 1 TABLET(S): at 17:59

## 2017-10-16 RX ADMIN — HEPARIN SODIUM 5000 UNIT(S): 5000 INJECTION INTRAVENOUS; SUBCUTANEOUS at 05:46

## 2017-10-16 RX ADMIN — BRIMONIDINE TARTRATE 1 DROP(S): 2 SOLUTION/ DROPS OPHTHALMIC at 05:46

## 2017-10-16 RX ADMIN — MIDODRINE HYDROCHLORIDE 10 MILLIGRAM(S): 2.5 TABLET ORAL at 06:29

## 2017-10-16 RX ADMIN — HEPARIN SODIUM 5000 UNIT(S): 5000 INJECTION INTRAVENOUS; SUBCUTANEOUS at 13:37

## 2017-10-16 RX ADMIN — HEPARIN SODIUM 5000 UNIT(S): 5000 INJECTION INTRAVENOUS; SUBCUTANEOUS at 21:45

## 2017-10-16 RX ADMIN — BENZOCAINE AND MENTHOL 1 LOZENGE: 5; 1 LIQUID ORAL at 11:59

## 2017-10-16 RX ADMIN — BRIMONIDINE TARTRATE 1 DROP(S): 2 SOLUTION/ DROPS OPHTHALMIC at 13:37

## 2017-10-16 RX ADMIN — Medication 81 MILLIGRAM(S): at 17:59

## 2017-10-16 RX ADMIN — SIMVASTATIN 40 MILLIGRAM(S): 20 TABLET, FILM COATED ORAL at 21:46

## 2017-10-16 RX ADMIN — BENZOCAINE AND MENTHOL 1 LOZENGE: 5; 1 LIQUID ORAL at 16:37

## 2017-10-16 RX ADMIN — ONDANSETRON 4 MILLIGRAM(S): 8 TABLET, FILM COATED ORAL at 20:57

## 2017-10-16 RX ADMIN — Medication 650 MILLIGRAM(S): at 00:17

## 2017-10-16 RX ADMIN — BENZOCAINE AND MENTHOL 1 LOZENGE: 5; 1 LIQUID ORAL at 02:31

## 2017-10-16 RX ADMIN — Medication 650 MILLIGRAM(S): at 17:10

## 2017-10-16 RX ADMIN — PANTOPRAZOLE SODIUM 40 MILLIGRAM(S): 20 TABLET, DELAYED RELEASE ORAL at 05:48

## 2017-10-16 RX ADMIN — Medication 650 MILLIGRAM(S): at 16:36

## 2017-10-16 RX ADMIN — Medication 200 MILLIGRAM(S): at 04:15

## 2017-10-16 NOTE — PROGRESS NOTE ADULT - SUBJECTIVE AND OBJECTIVE BOX
Patient is a 83y old  Female who presents with a chief complaint of chest pain, sore throat (12 Oct 2017 03:25)      SUBJECTIVE / OVERNIGHT EVENTS: patient unable to sleep well last night as she is coughing and bringing up secretions. Has abdominal pain when coughing. Bringing up clear, thick sputum. Denies dyspnea. Denies light headedness or dizziness.     MEDICATIONS  (STANDING):  aspirin  chewable 81 milliGRAM(s) Enteral Tube daily  brimonidine 0.2% Ophthalmic Solution 1 Drop(s) Both EYES three times a day  dorzolamide 2%/timolol 0.5% Ophthalmic Solution 1 Drop(s) Both EYES daily  heparin  Injectable 5000 Unit(s) SubCutaneous every 8 hours  influenza   Vaccine 0.5 milliLiter(s) IntraMuscular once  latanoprost 0.005% Ophthalmic Solution 1 Drop(s) Both EYES at bedtime  midodrine 10 milliGRAM(s) Oral three times a day  Nephro-asim 1 Tablet(s) Oral daily  pantoprazole   Suspension 40 milliGRAM(s) Oral before breakfast  simvastatin 40 milliGRAM(s) Oral at bedtime    MEDICATIONS  (PRN):  acetaminophen    Suspension. 650 milliGRAM(s) Oral every 6 hours PRN pain  ALBUTerol/ipratropium for Nebulization 3 milliLiter(s) Nebulizer every 6 hours PRN Shortness of Breath and/or Wheezing  benzocaine 15 mG/menthol 3.6 mG Lozenge 1 Lozenge Oral every 6 hours PRN Sore Throat  glycopyrrolate Injectable 0.4 milliGRAM(s) IV Push every 6 hours PRN secretions      Vital Signs Last 24 Hrs  T(C): 36.7 (16 Oct 2017 12:34), Max: 36.9 (15 Oct 2017 20:10)  T(F): 98.1 (16 Oct 2017 12:34), Max: 98.4 (15 Oct 2017 20:10)  HR: 79 (16 Oct 2017 12:34) (57 - 85)  BP: 142/86 (16 Oct 2017 13:32) (91/54 - 166/81)  BP(mean): 102 (15 Oct 2017 19:00) (68 - 102)  RR: 18 (16 Oct 2017 12:34) (18 - 26)  SpO2: 98% (16 Oct 2017 12:34) (96% - 99%)  CAPILLARY BLOOD GLUCOSE        I&O's Summary    15 Oct 2017 07:01  -  16 Oct 2017 07:00  --------------------------------------------------------  IN: 915 mL / OUT: 0 mL / NET: 915 mL    16 Oct 2017 07:01  -  16 Oct 2017 14:15  --------------------------------------------------------  IN: 120 mL / OUT: 0 mL / NET: 120 mL        PHYSICAL EXAM:  GENERAL: NAD, resting in bed   HEAD:  Atraumatic, Normocephalic  EYES: EOMI, PERRLA, conjunctiva and sclera clear  NECK: Supple, No JVD  CHEST/LUNG: +ronchi, no wheezing   HEART: Regular rate and rhythm;   ABDOMEN: Soft, Nontender, Nondistended; Bowel sounds present. peg in place,   EXTREMITIES:  2+ Peripheral Pulses, No clubbing, cyanosis. +nonpitting pedal edema b/l.   PSYCH: AAOx3  NEUROLOGY: non-focal      LABS:                        11.6   13.5  )-----------( 222      ( 16 Oct 2017 09:52 )             35.4     10-16    145  |  101  |  78<H>  ----------------------------<  108<H>  4.5   |  23  |  7.22<H>    Ca    10.4      16 Oct 2017 09:52  Phos  4.3     10-16  Mg     2.6     10-16    TPro  6.2  /  Alb  3.4  /  TBili  0.2  /  DBili  x   /  AST  27  /  ALT  24  /  AlkPhos  99  10-16

## 2017-10-16 NOTE — PROGRESS NOTE ADULT - PROBLEM SELECTOR PLAN 3
epigastric pain, unable to manage secretions (above baseline)/worsening dysphagia, GERD-reflux of contrast into esophagus  consider GI consult  await speech and swallow eval epigastric pain, unable to manage secretions (above baseline)/worsening dysphagia, GERD-reflux of contrast into esophagus  consider GI consult r/o esophageal dysphagia or esophageal etiology causing increased dysphagia  l epigastric pain, unable to manage secretions (above baseline)/worsening dysphagia, GERD-reflux of contrast into esophagus  consider GI consult r/o esophageal dysphagia or esophageal etiology causing increased dysphagia

## 2017-10-16 NOTE — PROGRESS NOTE ADULT - ASSESSMENT
83F c hx HLD, CVA c/b dysphagia s/p PEG, grade 1 DHF, LVOT obstruction, GERD, ESRD (T/Th/Sat via left IJ tunneled dialysis cath), left arm vascular stent, hypotension requiring midodrine, presented to [place] for worsening cough for 10 days, transferred to [facility] for NSTEMI mgmt. c/c/b hypotension/bradycardia

## 2017-10-16 NOTE — PROGRESS NOTE ADULT - SUBJECTIVE AND OBJECTIVE BOX
Patient seen and examined  no complaints      MSG causes dizziness (Other)  pcn (Hives)  penicillin (Hives)    Hospital Medications:   MEDICATIONS  (STANDING):  aspirin  chewable 81 milliGRAM(s) Enteral Tube daily  brimonidine 0.2% Ophthalmic Solution 1 Drop(s) Both EYES three times a day  dorzolamide 2%/timolol 0.5% Ophthalmic Solution 1 Drop(s) Both EYES daily  heparin  Injectable 5000 Unit(s) SubCutaneous every 8 hours  influenza   Vaccine 0.5 milliLiter(s) IntraMuscular once  latanoprost 0.005% Ophthalmic Solution 1 Drop(s) Both EYES at bedtime  midodrine 10 milliGRAM(s) Oral three times a day  Nephro-asim 1 Tablet(s) Oral daily  pantoprazole   Suspension 40 milliGRAM(s) Oral before breakfast  simvastatin 40 milliGRAM(s) Oral at bedtime        VITALS:  T(F): 98.1 (10-16-17 @ 12:34), Max: 98.4 (10-15-17 @ 20:10)  HR: 79 (10-16-17 @ 12:34)  BP: 166/81 (10-16-17 @ 12:34)  RR: 18 (10-16-17 @ 12:34)  SpO2: 98% (10-16-17 @ 12:34)  Wt(kg): --    10-15 @ 07:01  -  10-16 @ 07:00  --------------------------------------------------------  IN: 915 mL / OUT: 0 mL / NET: 915 mL    10-16 @ 07:01  -  10-16 @ 13:25  --------------------------------------------------------  IN: 120 mL / OUT: 0 mL / NET: 120 mL        PHYSICAL EXAM:  Constitutional: NAD  HEENT: anicteric sclera, oropharynx clear, MMM  Neck: No JVD  Respiratory: b/l rhonchi  Cardiovascular: S1, S2, RRR  Gastrointestinal: BS+, soft, NT/ND  Extremities: No cyanosis or clubbing. No peripheral edema  Vascular Access: + PC    LABS:  10-16    145  |  101  |  78<H>  ----------------------------<  108<H>  4.5   |  23  |  7.22<H>    Ca    10.4      16 Oct 2017 09:52  Phos  4.3     10-16  Mg     2.6     10-16    TPro  6.2  /  Alb  3.4  /  TBili  0.2  /  DBili      /  AST  27  /  ALT  24  /  AlkPhos  99  10-16    Creatinine Trend: 7.22 <--, 5.42 <--, 4.78 <--, 1.34 <--, 4.01 <--, 6.81 <--, 7.29 <--, 7.48 <--, 7.79 <--, 9.04 <--, 11.52 <--, 11.13 <--, 9.74 <--, 10.80 <--                        11.6   13.5  )-----------( 222      ( 16 Oct 2017 09:52 )             35.4     Urine Studies:      RADIOLOGY & ADDITIONAL STUDIES:

## 2017-10-16 NOTE — PROGRESS NOTE ADULT - ASSESSMENT
83F w/ hx of ESRD (on HD Tues/Thurs/Sat), HTN, CVA (~20 years ago; s/p PEG w/ left hemiparesis/dysphagia at baseline), LVOT obstruction, HLD and GERD who presented to [place] w/ 1 week hx of sore throat/chest pain and transferred to [facility] (10/12) after pt was found to have elevated troponins in the setting of missed HD w/ course c/b hypotension, bradycardia and syncopal event. Transferred to MICU on 10/13 after RRT was called for syncopal event, required phenylephrine while in MICU for hypotension. BP improved and now transferred to the floor:

## 2017-10-16 NOTE — PROGRESS NOTE ADULT - PROBLEM SELECTOR PLAN 7
-will put in for speech/swallow eval to see if dysphagia has worsened, and if there are any other speech interventions that can help patient manage her secretions. -continue with PEG tube

## 2017-10-16 NOTE — PROGRESS NOTE ADULT - PROBLEM SELECTOR PLAN 2
CT chest with LLL opacity  mild elevated wbc  check procalcitonin CT chest with LLL opacity w air bronchograms  proc .95  wbc rising  agree with ceftriaxone for tx of pna CT chest with LLL opacity w air bronchograms  proc .95  wbc rising  agree with levaquin for tx of pna

## 2017-10-16 NOTE — PROGRESS NOTE ADULT - ASSESSMENT
83F w/ history ESRD, LVOT obstruction/LVH p/w symptoms of URI, atypical chest pain, elevated cardiac biomarkers likely due to fluid retention from missed HD treatment in the setting of ESRD. Course complicated by hypotension, bradycardia and syncope requiring transition to MICU s/p transfer to tele floor (Echo has demonstrated at least moderate A.S. as well as mod to severe LV outflow tract obstruction; LV function preserved (hyperdynamic))    #LVOT obstruction with TRICE - concerning in the setting of syncope as valsalva maneuvers classically exacerbate the obstruction and can also makes the situation difficult to manage  - continue monitoring on current regimen. Her current telemetry rates have been acceptable.   - considering negative inotrope (eg. beta blocker) to help with the above issue but will monitor for now    #Syncope - multifactorial including vagal mediated and LVOT obstruction  - continue monitoring clinically with fall precautions    #Aortic stenosis - on exam she has a mid to late peaking murmur that is difficult to characterize because of the LVOT obstruction. It probably is not severe or critical at this time and we should hold off any further invasive testing for now.    Dr. Giacomo M.D.  [id number]

## 2017-10-16 NOTE — PROVIDER CONTACT NOTE (OTHER) - ASSESSMENT
BP- 163/91, HR 85. Patient requesting Robitussin cough syrup. complaints of pleuritic pain while coughing.

## 2017-10-16 NOTE — PROGRESS NOTE ADULT - SUBJECTIVE AND OBJECTIVE BOX
Follow-up Pulm Progress Note    still with cough, spitting out copious amounts of saliva, cough causing chest pain, pt with epigastric pain     Medications:  MEDICATIONS  (STANDING):  aspirin  chewable 81 milliGRAM(s) Enteral Tube daily  brimonidine 0.2% Ophthalmic Solution 1 Drop(s) Both EYES three times a day  dorzolamide 2%/timolol 0.5% Ophthalmic Solution 1 Drop(s) Both EYES daily  heparin  Injectable 5000 Unit(s) SubCutaneous every 8 hours  influenza   Vaccine 0.5 milliLiter(s) IntraMuscular once  latanoprost 0.005% Ophthalmic Solution 1 Drop(s) Both EYES at bedtime  midodrine 10 milliGRAM(s) Oral three times a day  Nephro-asim 1 Tablet(s) Oral daily  pantoprazole   Suspension 40 milliGRAM(s) Oral before breakfast  simvastatin 40 milliGRAM(s) Oral at bedtime    MEDICATIONS  (PRN):  acetaminophen    Suspension. 650 milliGRAM(s) Oral every 6 hours PRN pain  ALBUTerol/ipratropium for Nebulization 3 milliLiter(s) Nebulizer every 6 hours PRN Shortness of Breath and/or Wheezing  benzocaine 15 mG/menthol 3.6 mG Lozenge 1 Lozenge Oral every 6 hours PRN Sore Throat  glycopyrrolate Injectable 0.4 milliGRAM(s) IV Push every 6 hours PRN secretions          Vital Signs Last 24 Hrs  T(C): 36.8 (16 Oct 2017 04:04), Max: 36.9 (15 Oct 2017 20:10)  T(F): 98.3 (16 Oct 2017 04:04), Max: 98.4 (15 Oct 2017 20:10)  HR: 74 (16 Oct 2017 06:08) (57 - 85)  BP: 106/66 (16 Oct 2017 06:08) (91/54 - 163/91)  BP(mean): 102 (15 Oct 2017 19:00) (68 - 102)  RR: 18 (16 Oct 2017 04:04) (15 - 26)  SpO2: 96% (16 Oct 2017 04:04) (96% - 100%)          10-15 @ 07:01  -  10-16 @ 07:00  --------------------------------------------------------  IN: 915 mL / OUT: 0 mL / NET: 915 mL          LABS:                        11.6   13.5  )-----------( 222      ( 16 Oct 2017 09:52 )             35.4     10-16    145  |  101  |  78<H>  ----------------------------<  108<H>  4.5   |  23  |  7.22<H>    Ca    10.4      16 Oct 2017 09:52  Phos  4.3     10-16  Mg     2.6     10-16    TPro  6.2  /  Alb  3.4  /  TBili  0.2  /  DBili  x   /  AST  27  /  ALT  24  /  AlkPhos  99  10-16          CAPILLARY BLOOD GLUCOSE      POCT Blood Glucose.: 111 mg/dL (14 Oct 2017 17:37)                        CULTURES: (if applicable)  Culture - Blood (10.12.17 @ 07:42)    Specimen Source: .Blood Blood-Venous    Culture Results:   No growth to date.    Culture - Blood (10.12.17 @ 07:42)    Specimen Source: .Blood Blood-Peripheral    Culture Results:   No growth to date.            Physical Examination:  PULM: decreased bs bilaterally, no significant sputum production  CVS: S1, S2 heard    RADIOLOGY REVIEWED  CXR: < from: Xray Chest 1 View AP- PORTABLE-Urgent (10.11.17 @ 22:09) >    FINDINGS:     There are bilateral upper extremity vascular stents. A left-sided   dialysis catheter terminates with the tip in the right atrium, unchanged   from 3/4/2017.  The lungs are clear.  There is no pleural effusion or pneumothorax.  The heart size is normal.  There is no acute osseous abnormality.    IMPRESSION:    No acute disease.    < end of copied text >      CT chest:< from: CT Chest No Cont (10.13.17 @ 21:16) >      PROCEDURE DATE:  10/13/2017            INTERPRETATION:  CLINICAL INFORMATION: Abdominal pain. CVA and dysphagia.    COMPARISON: CT abdomen pelvis 9/29/2017 and CT chest 4/2/2017.    PROCEDURE:   CT of the Chest, Abdomen and Pelvis was performed without intravenous   contrast.   Intravenous contrast: None.  Oral contrast: positive contrast was administered.  Sagittal and coronal reformats were performed.    FINDINGS:    CHEST:     LUNGS AND LARGE AIRWAYS: Mucosal debris in the trachea and bilateral   mainstem bronchi. Bibasilar subsegmental atelectasis. Peripheral nodular   left lower lobe opacity measures 1.8 cm (2, 44). This may represent an  area of focal atelectasis. There was a similar though smaller area of   atelectasis noted on the patient's previous study. Suggest nonemergent   follow-up CT post resolution of patient's acute symptoms. Left lower lobe   calcified granuloma.  PLEURA: Small bilateral pleural effusions.  VESSELS: Atheromatous disease of the aorta and coronary arteries.   Bilateral axillary stents.  HEART: Heart size is normal.No pericardial effusion.  MEDIASTINUM AND YURI: No lymphadenopathy.  CHEST WALL AND LOWER NECK: Left-sided central venous catheter. Extends   into the inferior vena cava at the junction of the hepatic veins.    ABDOMEN AND PELVIS:    LIVER: Hepatic cysts and subcentimeter hypodense hepatic foci too small   to characterize.  BILE DUCTS: Normal caliber.  GALLBLADDER: Within normal limits.  SPLEEN: Within normal limits.  PANCREAS: Within normal limits.  ADRENALS: Within normal limits.  KIDNEYS/URETERS: Atrophic kidneys with multiple scattered nonobstructing   stones and cystic lesions as on prior exam. Hyperdense left lower pole   lesion measuring 1 cm is compatible with a hemorrhagic or proteinaceous   cyst. No hydronephrosis.    BLADDER: Incompletely distended.  REPRODUCTIVE ORGANS: Uterus and adnexa are within normal limits.    BOWEL: Percutaneous gastrostomy tube terminates in the stomach. Reflux of   oral contrast from stomach into the esophagus. No bowel obstruction.   Appendix is normal.  PERITONEUM: No ascites.  VESSELS:  Atheromatous disease of the aorta and iliac vessels.  RETROPERITONEUM: No lymphadenopathy.    ABDOMINAL WALL: Percutaneous gastrostomy tube.  BONES: Sclerotic appearance of the visualized osseous structures   compatible with renal osteodystrophy.    IMPRESSION:   Small bilateral pleural effusions.  Reflux of contrast from stomach into the esophagus, making the patient at   risk for aspiration.   An indeterminate 1.8 cm peripheral left lower lobe nodular opacity.   Recommend nonemergent follow-up chest CT for further evaluation.  Bilateral atrophic kidneys.  Central venous catheter extends into the inferior vena cave at the   confluence of hepatic veins.    < end of copied text >      TTE:< from: Transthoracic Echocardiogram (10.13.17 @ 08:56) >  ------------------------------------------------------------------------  Conclusions:  1. Mitral annular calcification. There is systolic anterior  motion of the mitral valve. Moderate, eccentric,  posteriorly-directed mitral regurgitation.  2. Aortic valve not well visualized; appears to be a  calcified trileaflet valve with decreased opening. Measured  peak transaortic valve gradient equals 47 mm Hg. Unable to  assess degree of aortic stenosis due to the presence of  systolic anterior motion of the mitral valve and elevated  LVOT gradient. Visually, the aortic valve appears at least  moderately stenotic. Consider REDD to evaluate the aortic  valve if clinically indicated. No aortic valve  regurgitation seen.  3. Mild left ventricular hypertrophy with basal septal  bulge. Basal septal thickness=1.3 cm.  4. Hyperdynamic left ventricle. Peak left ventricular  outflow tract gradient equals 54 mm Hg, consistent with  moderately severe LVOT obstruction.  5. Mild diastolic dysfunction (Stage I).  6. Normal right ventricular size and function.  *** No previous Echo exam.  ----------------------------------------    < end of copied text > Follow-up Pulm Progress Note    still with cough, spitting out copious amounts of saliva, cough causing chest pain, pt with epigastric pain     Medications:  MEDICATIONS  (STANDING):  aspirin  chewable 81 milliGRAM(s) Enteral Tube daily  brimonidine 0.2% Ophthalmic Solution 1 Drop(s) Both EYES three times a day  dorzolamide 2%/timolol 0.5% Ophthalmic Solution 1 Drop(s) Both EYES daily  heparin  Injectable 5000 Unit(s) SubCutaneous every 8 hours  influenza   Vaccine 0.5 milliLiter(s) IntraMuscular once  latanoprost 0.005% Ophthalmic Solution 1 Drop(s) Both EYES at bedtime  midodrine 10 milliGRAM(s) Oral three times a day  Nephro-asim 1 Tablet(s) Oral daily  pantoprazole   Suspension 40 milliGRAM(s) Oral before breakfast  simvastatin 40 milliGRAM(s) Oral at bedtime    MEDICATIONS  (PRN):  acetaminophen    Suspension. 650 milliGRAM(s) Oral every 6 hours PRN pain  ALBUTerol/ipratropium for Nebulization 3 milliLiter(s) Nebulizer every 6 hours PRN Shortness of Breath and/or Wheezing  benzocaine 15 mG/menthol 3.6 mG Lozenge 1 Lozenge Oral every 6 hours PRN Sore Throat  glycopyrrolate Injectable 0.4 milliGRAM(s) IV Push every 6 hours PRN secretions    Vital Signs Last 24 Hrs  T(C): 36.8 (16 Oct 2017 04:04), Max: 36.9 (15 Oct 2017 20:10)  T(F): 98.3 (16 Oct 2017 04:04), Max: 98.4 (15 Oct 2017 20:10)  HR: 74 (16 Oct 2017 06:08) (57 - 85)  BP: 106/66 (16 Oct 2017 06:08) (91/54 - 163/91)  BP(mean): 102 (15 Oct 2017 19:00) (68 - 102)  RR: 18 (16 Oct 2017 04:04) (15 - 26)  SpO2: 96% (16 Oct 2017 04:04) (96% - 100%)    10-15 @ 07:01  -  10-16 @ 07:00  --------------------------------------------------------  IN: 915 mL / OUT: 0 mL / NET: 915 mL    LABS:                        11.6   13.5  )-----------( 222      ( 16 Oct 2017 09:52 )             35.4     10-16    145  |  101  |  78<H>  ----------------------------<  108<H>  4.5   |  23  |  7.22<H>    Ca    10.4      16 Oct 2017 09:52  Phos  4.3     10-16  Mg     2.6     10-16    TPro  6.2  /  Alb  3.4  /  TBili  0.2  /  DBili  x   /  AST  27  /  ALT  24  /  AlkPhos  99  10-16          CAPILLARY BLOOD GLUCOSE      POCT Blood Glucose.: 111 mg/dL (14 Oct 2017 17:37)    CULTURES: (if applicable)  Culture - Blood (10.12.17 @ 07:42)    Specimen Source: .Blood Blood-Venous    Culture Results:   No growth to date.    Culture - Blood (10.12.17 @ 07:42)    Specimen Source: .Blood Blood-Peripheral    Culture Results:   No growth to date.            Physical Examination:  PULM: decreased bs bilaterally, no significant sputum production  CVS: S1, S2 heard    RADIOLOGY REVIEWED  CXR: < from: Xray Chest 1 View AP- PORTABLE-Urgent (10.11.17 @ 22:09) >    FINDINGS:     There are bilateral upper extremity vascular stents. A left-sided   dialysis catheter terminates with the tip in the right atrium, unchanged   from 3/4/2017.  The lungs are clear.  There is no pleural effusion or pneumothorax.  The heart size is normal.  There is no acute osseous abnormality.    IMPRESSION:    No acute disease.    < end of copied text >      CT chest:< from: CT Chest No Cont (10.13.17 @ 21:16) >      PROCEDURE DATE:  10/13/2017            INTERPRETATION:  CLINICAL INFORMATION: Abdominal pain. CVA and dysphagia.    COMPARISON: CT abdomen pelvis 9/29/2017 and CT chest 4/2/2017.    PROCEDURE:   CT of the Chest, Abdomen and Pelvis was performed without intravenous   contrast.   Intravenous contrast: None.  Oral contrast: positive contrast was administered.  Sagittal and coronal reformats were performed.    FINDINGS:    CHEST:     LUNGS AND LARGE AIRWAYS: Mucosal debris in the trachea and bilateral   mainstem bronchi. Bibasilar subsegmental atelectasis. Peripheral nodular   left lower lobe opacity measures 1.8 cm (2, 44). This may represent an  area of focal atelectasis. There was a similar though smaller area of   atelectasis noted on the patient's previous study. Suggest nonemergent   follow-up CT post resolution of patient's acute symptoms. Left lower lobe   calcified granuloma.  PLEURA: Small bilateral pleural effusions.  VESSELS: Atheromatous disease of the aorta and coronary arteries.   Bilateral axillary stents.  HEART: Heart size is normal.No pericardial effusion.  MEDIASTINUM AND YURI: No lymphadenopathy.  CHEST WALL AND LOWER NECK: Left-sided central venous catheter. Extends   into the inferior vena cava at the junction of the hepatic veins.    ABDOMEN AND PELVIS:    LIVER: Hepatic cysts and subcentimeter hypodense hepatic foci too small   to characterize.  BILE DUCTS: Normal caliber.  GALLBLADDER: Within normal limits.  SPLEEN: Within normal limits.  PANCREAS: Within normal limits.  ADRENALS: Within normal limits.  KIDNEYS/URETERS: Atrophic kidneys with multiple scattered nonobstructing   stones and cystic lesions as on prior exam. Hyperdense left lower pole   lesion measuring 1 cm is compatible with a hemorrhagic or proteinaceous   cyst. No hydronephrosis.    BLADDER: Incompletely distended.  REPRODUCTIVE ORGANS: Uterus and adnexa are within normal limits.    BOWEL: Percutaneous gastrostomy tube terminates in the stomach. Reflux of   oral contrast from stomach into the esophagus. No bowel obstruction.   Appendix is normal.  PERITONEUM: No ascites.  VESSELS:  Atheromatous disease of the aorta and iliac vessels.  RETROPERITONEUM: No lymphadenopathy.    ABDOMINAL WALL: Percutaneous gastrostomy tube.  BONES: Sclerotic appearance of the visualized osseous structures   compatible with renal osteodystrophy.    IMPRESSION:   Small bilateral pleural effusions.  Reflux of contrast from stomach into the esophagus, making the patient at   risk for aspiration.   An indeterminate 1.8 cm peripheral left lower lobe nodular opacity.   Recommend nonemergent follow-up chest CT for further evaluation.  Bilateral atrophic kidneys.  Central venous catheter extends into the inferior vena cave at the   confluence of hepatic veins.    < end of copied text >      TTE:< from: Transthoracic Echocardiogram (10.13.17 @ 08:56) >  ------------------------------------------------------------------------  Conclusions:  1. Mitral annular calcification. There is systolic anterior  motion of the mitral valve. Moderate, eccentric,  posteriorly-directed mitral regurgitation.  2. Aortic valve not well visualized; appears to be a  calcified trileaflet valve with decreased opening. Measured  peak transaortic valve gradient equals 47 mm Hg. Unable to  assess degree of aortic stenosis due to the presence of  systolic anterior motion of the mitral valve and elevated  LVOT gradient. Visually, the aortic valve appears at least  moderately stenotic. Consider REDD to evaluate the aortic  valve if clinically indicated. No aortic valve  regurgitation seen.  3. Mild left ventricular hypertrophy with basal septal  bulge. Basal septal thickness=1.3 cm.  4. Hyperdynamic left ventricle. Peak left ventricular  outflow tract gradient equals 54 mm Hg, consistent with  moderately severe LVOT obstruction.  5. Mild diastolic dysfunction (Stage I).  6. Normal right ventricular size and function.  *** No previous Echo exam.  ----------------------------------------    < end of copied text >

## 2017-10-16 NOTE — PROGRESS NOTE ADULT - SUBJECTIVE AND OBJECTIVE BOX
Interval History: No acute issues overnight.    Review Of Systems:  Constitutional: [ ] Fever [ ] Chills [ ] Fatigue [ ] Weight change   HEENT: [ ] Blurred vision [ ] Eye Pain [ ] Headache [ ] Runny nose [ ] Sore Throat   Respiratory: [ ] Cough [ ] Wheezing [ ] Shortness of breath  Cardiovascular: [ ] Chest Pain [ ] Palpitations [ ] CHICAS [ ] PND [ ] Orthopnea  Gastrointestinal: [ ] Abdominal Pain [ ] Diarrhea [ ] Constipation [ ] Hemorrhoids [ ] Nausea [ ] Vomiting  Genitourinary: [ ] Nocturia [ ] Dysuria [ ] Incontinence  Extremities: [ ] Swelling [ ] Joint Pain  Neurologic: [ ] Focal deficit [ ] Paresthesias [ ] Syncope  Lymphatic: [ ] Swelling [ ] Lymphadenopathy   Skin: [ ] Rash [ ] Ecchymoses [ ] Wounds [ ] Lesions  Psychiatry: [ ] Depression [ ] Suicidal/Homicidal Ideation [ ] Anxiety [ ] Sleep Disturbances  [ x] 10 point review of systems is otherwise negative except as mentioned above            [ ]Unable to obtain    MEDICATIONS  (STANDING):  aspirin  chewable 81 milliGRAM(s) Enteral Tube daily  brimonidine 0.2% Ophthalmic Solution 1 Drop(s) Both EYES three times a day  dorzolamide 2%/timolol 0.5% Ophthalmic Solution 1 Drop(s) Both EYES daily  heparin  Injectable 5000 Unit(s) SubCutaneous every 8 hours  influenza   Vaccine 0.5 milliLiter(s) IntraMuscular once  latanoprost 0.005% Ophthalmic Solution 1 Drop(s) Both EYES at bedtime  midodrine 10 milliGRAM(s) Oral three times a day  Nephro-asim 1 Tablet(s) Oral daily  pantoprazole   Suspension 40 milliGRAM(s) Oral before breakfast  simvastatin 40 milliGRAM(s) Oral at bedtime    MEDICATIONS  (PRN):  acetaminophen    Suspension. 650 milliGRAM(s) Oral every 6 hours PRN pain  ALBUTerol/ipratropium for Nebulization 3 milliLiter(s) Nebulizer every 6 hours PRN Shortness of Breath and/or Wheezing  benzocaine 15 mG/menthol 3.6 mG Lozenge 1 Lozenge Oral every 6 hours PRN Sore Throat  glycopyrrolate Injectable 0.4 milliGRAM(s) IV Push every 6 hours PRN secretions      Telemetry:  SR 60-80s    ICU Vital Signs Last 24 Hrs  T(C): 36.7 (16 Oct 2017 12:34), Max: 36.9 (15 Oct 2017 20:10)  T(F): 98.1 (16 Oct 2017 12:34), Max: 98.4 (15 Oct 2017 20:10)  HR: 79 (16 Oct 2017 12:34) (57 - 85)  BP: 166/81 (16 Oct 2017 12:34) (91/54 - 166/81)  BP(mean): 102 (15 Oct 2017 19:00) (68 - 102)  ABP: --  ABP(mean): --  RR: 18 (16 Oct 2017 12:34) (18 - 26)  SpO2: 98% (16 Oct 2017 12:34) (96% - 100%)        Appearance: Normal	  HEENT:   Normal oral mucosa  Lymphatic: No lymphadenopathy  Cardiovascular: Normal S1 S2,         No JVD,      faint systolic murmur at apex,      No edema  Respiratory: Lungs clear to auscultation	  Psychiatry: Mood & affect appropriate  Gastrointestinal:  Soft, Non-tender	  Skin: No rashes, No ecchymoses, No cyanosis	  Neurologic: Non-focal  Extremities: Normal range of motion, No clubbing, cyanosis   Vascular: warm extremities      LABS:                        11.6   13.5  )-----------( 222      ( 16 Oct 2017 09:52 )             35.4     16 Oct 2017 09:52    145    |  101    |  78     ----------------------------<  108    4.5     |  23     |  7.22     Ca    10.4       16 Oct 2017 09:52  Phos  4.3       16 Oct 2017 09:52  Mg     2.6       16 Oct 2017 09:52    TPro  6.2    /  Alb  3.4    /  TBili  0.2    /  DBili  x      /  AST  27     /  ALT  24     /  AlkPhos  99     16 Oct 2017 09:52 Interval History: No acute issues overnight.    Review Of Systems:  Constitutional: [ ] Fever [ ] Chills [ ] Fatigue [ ] Weight change   HEENT: [ ] Blurred vision [ ] Eye Pain [ ] Headache [ ] Runny nose [ ] Sore Throat   Respiratory: [ ] Cough [ ] Wheezing [ ] Shortness of breath  Cardiovascular: [ ] Chest Pain [ ] Palpitations [ ] CHICAS [ ] PND [ ] Orthopnea  Gastrointestinal: [ ] Abdominal Pain [ ] Diarrhea [ ] Constipation [ ] Hemorrhoids [ ] Nausea [ ] Vomiting  Genitourinary: [ ] Nocturia [ ] Dysuria [ ] Incontinence  Extremities: [ ] Swelling [ ] Joint Pain  Neurologic: [ ] Focal deficit [ ] Paresthesias [ ] Syncope  Lymphatic: [ ] Swelling [ ] Lymphadenopathy   Skin: [ ] Rash [ ] Ecchymoses [ ] Wounds [ ] Lesions  Psychiatry: [ ] Depression [ ] Suicidal/Homicidal Ideation [ ] Anxiety [ ] Sleep Disturbances  [ x] 10 point review of systems is otherwise negative except as mentioned above            [ ]Unable to obtain    MEDICATIONS  (STANDING):  aspirin  chewable 81 milliGRAM(s) Enteral Tube daily  brimonidine 0.2% Ophthalmic Solution 1 Drop(s) Both EYES three times a day  dorzolamide 2%/timolol 0.5% Ophthalmic Solution 1 Drop(s) Both EYES daily  heparin  Injectable 5000 Unit(s) SubCutaneous every 8 hours  influenza   Vaccine 0.5 milliLiter(s) IntraMuscular once  latanoprost 0.005% Ophthalmic Solution 1 Drop(s) Both EYES at bedtime  midodrine 10 milliGRAM(s) Oral three times a day  Nephro-asim 1 Tablet(s) Oral daily  pantoprazole   Suspension 40 milliGRAM(s) Oral before breakfast  simvastatin 40 milliGRAM(s) Oral at bedtime    MEDICATIONS  (PRN):  acetaminophen    Suspension. 650 milliGRAM(s) Oral every 6 hours PRN pain  ALBUTerol/ipratropium for Nebulization 3 milliLiter(s) Nebulizer every 6 hours PRN Shortness of Breath and/or Wheezing  benzocaine 15 mG/menthol 3.6 mG Lozenge 1 Lozenge Oral every 6 hours PRN Sore Throat  glycopyrrolate Injectable 0.4 milliGRAM(s) IV Push every 6 hours PRN secretions      Telemetry:  SR 60-80s    ICU Vital Signs Last 24 Hrs  T(C): 36.7 (16 Oct 2017 12:34), Max: 36.9 (15 Oct 2017 20:10)  T(F): 98.1 (16 Oct 2017 12:34), Max: 98.4 (15 Oct 2017 20:10)  HR: 79 (16 Oct 2017 12:34) (57 - 85)  BP: 166/81 (16 Oct 2017 12:34) (91/54 - 166/81)  BP(mean): 102 (15 Oct 2017 19:00) (68 - 102)  ABP: --  ABP(mean): --  RR: 18 (16 Oct 2017 12:34) (18 - 26)  SpO2: 98% (16 Oct 2017 12:34) (96% - 100%)        Appearance: Deconditioned  HEENT:   Normal oral mucosa  Lymphatic: No lymphadenopathy  Cardiovascular: Normal S1 S2, No JVD, faint systolic murmur at apex radiating to carotids, No edema  Respiratory: Lungs clear to auscultation	  Psychiatry: Mood & affect appropriate  Gastrointestinal:  Soft, Non-tender	  Skin: No rashes, No ecchymoses, No cyanosis	  Neurologic: Non-focal  Extremities: Normal range of motion, No clubbing, cyanosis   Vascular: warm extremities      LABS:                        11.6   13.5  )-----------( 222      ( 16 Oct 2017 09:52 )             35.4     16 Oct 2017 09:52    145    |  101    |  78     ----------------------------<  108    4.5     |  23     |  7.22     Ca    10.4       16 Oct 2017 09:52  Phos  4.3       16 Oct 2017 09:52  Mg     2.6       16 Oct 2017 09:52    TPro  6.2    /  Alb  3.4    /  TBili  0.2    /  DBili  x      /  AST  27     /  ALT  24     /  AlkPhos  99     16 Oct 2017 09:52 Interval History: No acute issues overnight.    Review Of Systems:  Constitutional: [ ] Fever [ ] Chills [ ] Fatigue [ ] Weight change   HEENT: [ ] Blurred vision [ ] Eye Pain [ ] Headache [ ] Runny nose [ ] Sore Throat   Respiratory: [ ] Cough [ ] Wheezing [ ] Shortness of breath  Cardiovascular: [ ] Chest Pain [ ] Palpitations [ ] CHICAS [ ] PND [ ] Orthopnea  Gastrointestinal: [ ] Abdominal Pain [ ] Diarrhea [ ] Constipation [ ] Hemorrhoids [ ] Nausea [ ] Vomiting  Genitourinary: [ ] Nocturia [ ] Dysuria [ ] Incontinence  Extremities: [ ] Swelling [ ] Joint Pain  Neurologic: [ ] Focal deficit [ ] Paresthesias [ ] Syncope  Lymphatic: [ ] Swelling [ ] Lymphadenopathy   Skin: [ ] Rash [ ] Ecchymoses [ ] Wounds [ ] Lesions  Psychiatry: [ ] Depression [ ] Suicidal/Homicidal Ideation [ ] Anxiety [ ] Sleep Disturbances  [ x] 10 point review of systems is otherwise negative except as mentioned above            [ ]Unable to obtain    MEDICATIONS  (STANDING):  aspirin  chewable 81 milliGRAM(s) Enteral Tube daily  brimonidine 0.2% Ophthalmic Solution 1 Drop(s) Both EYES three times a day  dorzolamide 2%/timolol 0.5% Ophthalmic Solution 1 Drop(s) Both EYES daily  heparin  Injectable 5000 Unit(s) SubCutaneous every 8 hours  influenza   Vaccine 0.5 milliLiter(s) IntraMuscular once  latanoprost 0.005% Ophthalmic Solution 1 Drop(s) Both EYES at bedtime  midodrine 10 milliGRAM(s) Oral three times a day  Nephro-asim 1 Tablet(s) Oral daily  pantoprazole   Suspension 40 milliGRAM(s) Oral before breakfast  simvastatin 40 milliGRAM(s) Oral at bedtime    MEDICATIONS  (PRN):  acetaminophen    Suspension. 650 milliGRAM(s) Oral every 6 hours PRN pain  ALBUTerol/ipratropium for Nebulization 3 milliLiter(s) Nebulizer every 6 hours PRN Shortness of Breath and/or Wheezing  benzocaine 15 mG/menthol 3.6 mG Lozenge 1 Lozenge Oral every 6 hours PRN Sore Throat  glycopyrrolate Injectable 0.4 milliGRAM(s) IV Push every 6 hours PRN secretions      Telemetry:  SR 60-80s    ICU Vital Signs Last 24 Hrs  T(C): 36.7 (16 Oct 2017 12:34), Max: 36.9 (15 Oct 2017 20:10)  T(F): 98.1 (16 Oct 2017 12:34), Max: 98.4 (15 Oct 2017 20:10)  HR: 79 (16 Oct 2017 12:34) (57 - 85)  BP: 166/81 (16 Oct 2017 12:34) (91/54 - 166/81)  BP(mean): 102 (15 Oct 2017 19:00) (68 - 102)  ABP: --  ABP(mean): --  RR: 18 (16 Oct 2017 12:34) (18 - 26)  SpO2: 98% (16 Oct 2017 12:34) (96% - 100%)        Appearance: Deconditioned  HEENT:   Normal oral mucosa  Lymphatic: No lymphadenopathy  Cardiovascular: Normal S1 S2, No JVD, II/VI LOU audible in R second intercostal space/radiation to carotids, No edema  Respiratory: Lungs clear to auscultation	  Psychiatry: Mood & affect appropriate  Gastrointestinal:  Soft, Non-tender	  Skin: No rashes, No ecchymoses, No cyanosis	  Neurologic: Non-focal  Extremities: Normal range of motion, No clubbing, cyanosis   Vascular: warm extremities      LABS:                        11.6   13.5  )-----------( 222      ( 16 Oct 2017 09:52 )             35.4     16 Oct 2017 09:52    145    |  101    |  78     ----------------------------<  108    4.5     |  23     |  7.22     Ca    10.4       16 Oct 2017 09:52  Phos  4.3       16 Oct 2017 09:52  Mg     2.6       16 Oct 2017 09:52    TPro  6.2    /  Alb  3.4    /  TBili  0.2    /  DBili  x      /  AST  27     /  ALT  24     /  AlkPhos  99     16 Oct 2017 09:52 ********Cardiology Progress Note***************    CC: cp W/ COUGHING      INTERVAL HISTORY:  c/o non-productive cough producing anterior CP.  No throat, jaw. arm or upper back pain.  No dyspnea, orthopnea, PND.  No edema. No palpitations, dizziness or syncope.     VS: Tm 98.2, P 86, /94, R 16, 97% RA  In the ED, received reglan IV 10. (12 Oct 2017 03:25)    Allergies    MSG causes dizziness (Other)  pcn (Hives)  penicillin (Hives)    Intolerances    	    MEDICATIONS:  aspirin  chewable 81 milliGRAM(s) Enteral Tube daily  heparin  Injectable 5000 Unit(s) SubCutaneous every 8 hours  midodrine 10 milliGRAM(s) Oral three times a day    levoFLOXacin IVPB        ALBUTerol/ipratropium for Nebulization 3 milliLiter(s) Nebulizer every 6 hours PRN  HYDROcodone/homatropine Syrup 5 milliLiter(s) Oral every 8 hours PRN    acetaminophen    Suspension. 650 milliGRAM(s) Oral every 6 hours PRN    glycopyrrolate Injectable 0.4 milliGRAM(s) IV Push every 6 hours PRN  pantoprazole   Suspension 40 milliGRAM(s) Oral before breakfast    simvastatin 40 milliGRAM(s) Oral at bedtime    benzocaine 15 mG/menthol 3.6 mG Lozenge 1 Lozenge Oral every 6 hours PRN  brimonidine 0.2% Ophthalmic Solution 1 Drop(s) Both EYES three times a day  dorzolamide 2%/timolol 0.5% Ophthalmic Solution 1 Drop(s) Both EYES daily  influenza   Vaccine 0.5 milliLiter(s) IntraMuscular once  latanoprost 0.005% Ophthalmic Solution 1 Drop(s) Both EYES at bedtime  Nephro-asim 1 Tablet(s) Oral daily      PAST MEDICAL & SURGICAL HISTORY:  Hypotension  Murmur  Osteoporosis  Chronic renal failure: On hemodialysis T, Th, Sat  History of CVA (cerebrovascular accident): Left sided weakness, dysphagia, PEG in place  Status post insertion of dialysis catheter: 2017  S/P gastrostomy: PEG tube  Arteriovenous graft for hemodialysis in place, primary: 1991  non functioning  S/P cataract extraction: Bilateral      FAMILY HISTORY:  Family history of breast cancer (Grandparent)  Family history of heart disease  History of hypertension      SOCIAL HISTORY:  unchanged    REVIEW OF SYSTEMS:    CONSTITUTIONAL: No fever or chills.  appetite is normal  EYES: no visual disturbances  ENMT:  No epistaxis  RESPIRATORY: No wheezing  or hemoptysis  CARDIOVASCULAR: see HPI  GASTROINTESTINAL: No abdominal pain. No nausea, vomiting, or hematemesis; No diarrhea or constipation. No melena or hematochezia.  GENITOURINARY: No dysuria, frequency, hematuria  NEUROLOGICAL: No headache.  No amaurosis.  No new focal motor or sensory disturbance  SKIN: No pruritis or rash   MUSCULOSKELETAL: No joint pain or swelling; No muscle, back, or extremity pain  PSYCHIATRIC: No depression, anxiety or difficulty sleeping  HEME/LYMPH: No easy bruising, or bleeding gums  	    [x ] All others negative	    PHYSICAL EXAM:  T(C): 36.6 (10-16-17 @ 20:10), Max: 36.8 (10-16-17 @ 04:04)  HR: 81 (10-16-17 @ 21:51) (57 - 85)  BP: 159/83 (10-16-17 @ 21:51) (106/66 - 168/88)  RR: 18 (10-16-17 @ 20:10) (17 - 18)  SpO2: 99% (10-16-17 @ 20:10) (96% - 99%)  Wt(kg): --  I&O's Summary    15 Oct 2017 07:01  -  16 Oct 2017 07:00  --------------------------------------------------------  IN: 915 mL / OUT: 0 mL / NET: 915 mL    16 Oct 2017 07:01  -  17 Oct 2017 00:38  --------------------------------------------------------  IN: 855 mL / OUT: 0 mL / NET: 855 mL        Appearance: NAD. No respiratory distress. Kyphotic.  HEENT:   Normal oral mucosa, PERRL, EOMI	  Lymphatic: No lymphadenopathy  Cardiovascular: No JVD.  PMI not palpable. Normal S1 S2.  I/VI basal LOU. No edema  Respiratory: Lungs clear to auscultation	  Psychiatry: A & O x 3, Mood & affect appropriate  Gastrointestinal:  Soft, Non-tender, + BS	  Skin: No rashes, No ecchymoses, No cyanosis	  Neurologic: Non-focal  Extremities: Normal range of motion, No clubbing, cyanosis or edema  Vascular: Peripheral pulses palpable 2+ bilaterally      LABS:	 	    CBC Full  -  ( 16 Oct 2017 09:52 )  WBC Count : 13.5 K/uL  Hemoglobin : 11.6 g/dL  Hematocrit : 35.4 %  Platelet Count - Automated : 222 K/uL  Mean Cell Volume : 98.9 fl  Mean Cell Hemoglobin : 32.5 pg  Mean Cell Hemoglobin Concentration : 32.9 gm/dL  Auto Neutrophil # : x  Auto Lymphocyte # : x  Auto Monocyte # : x  Auto Eosinophil # : x  Auto Basophil # : x  Auto Neutrophil % : x  Auto Lymphocyte % : x  Auto Monocyte % : x  Auto Eosinophil % : x  Auto Basophil % : x    10-16    145  |  101  |  78<H>  ----------------------------<  108<H>  4.5   |  23  |  7.22<H>  10-15    142  |  105  |  53<H>  ----------------------------<  124<H>  3.8   |  22  |  5.42<H>    Ca    10.4      16 Oct 2017 09:52  Ca    9.4      15 Oct 2017 04:22  Phos  4.3     10-16  Phos  3.5     10-15  Mg     2.6     10-16  Mg     2.2     10-15    TPro  6.2  /  Alb  3.4  /  TBili  0.2  /  DBili  x   /  AST  27  /  ALT  24  /  AlkPhos  99  10-16  TPro  5.6<L>  /  Alb  2.9<L>  /  TBili  0.2  /  DBili  x   /  AST  26  /  ALT  22  /  AlkPhos  89  10-15    TELEMETRY: 	    ECG:  	  RADIOLOGY:

## 2017-10-17 LAB
CULTURE RESULTS: SIGNIFICANT CHANGE UP
CULTURE RESULTS: SIGNIFICANT CHANGE UP
SPECIMEN SOURCE: SIGNIFICANT CHANGE UP
SPECIMEN SOURCE: SIGNIFICANT CHANGE UP

## 2017-10-17 PROCEDURE — 99232 SBSQ HOSP IP/OBS MODERATE 35: CPT | Mod: GC

## 2017-10-17 PROCEDURE — 99223 1ST HOSP IP/OBS HIGH 75: CPT | Mod: GC

## 2017-10-17 PROCEDURE — 99233 SBSQ HOSP IP/OBS HIGH 50: CPT

## 2017-10-17 RX ORDER — PANTOPRAZOLE SODIUM 20 MG/1
40 TABLET, DELAYED RELEASE ORAL
Qty: 0 | Refills: 0 | Status: DISCONTINUED | OUTPATIENT
Start: 2017-10-17 | End: 2017-10-19

## 2017-10-17 RX ORDER — PANTOPRAZOLE SODIUM 20 MG/1
40 TABLET, DELAYED RELEASE ORAL
Qty: 0 | Refills: 0 | Status: DISCONTINUED | OUTPATIENT
Start: 2017-10-17 | End: 2017-10-17

## 2017-10-17 RX ADMIN — HEPARIN SODIUM 5000 UNIT(S): 5000 INJECTION INTRAVENOUS; SUBCUTANEOUS at 21:34

## 2017-10-17 RX ADMIN — Medication 650 MILLIGRAM(S): at 08:15

## 2017-10-17 RX ADMIN — HEPARIN SODIUM 5000 UNIT(S): 5000 INJECTION INTRAVENOUS; SUBCUTANEOUS at 13:32

## 2017-10-17 RX ADMIN — DORZOLAMIDE HYDROCHLORIDE TIMOLOL MALEATE 1 DROP(S): 20; 5 SOLUTION/ DROPS OPHTHALMIC at 12:36

## 2017-10-17 RX ADMIN — SIMVASTATIN 40 MILLIGRAM(S): 20 TABLET, FILM COATED ORAL at 21:34

## 2017-10-17 RX ADMIN — BRIMONIDINE TARTRATE 1 DROP(S): 2 SOLUTION/ DROPS OPHTHALMIC at 05:54

## 2017-10-17 RX ADMIN — Medication 1 TABLET(S): at 12:34

## 2017-10-17 RX ADMIN — LATANOPROST 1 DROP(S): 0.05 SOLUTION/ DROPS OPHTHALMIC; TOPICAL at 21:33

## 2017-10-17 RX ADMIN — BRIMONIDINE TARTRATE 1 DROP(S): 2 SOLUTION/ DROPS OPHTHALMIC at 21:33

## 2017-10-17 RX ADMIN — HEPARIN SODIUM 5000 UNIT(S): 5000 INJECTION INTRAVENOUS; SUBCUTANEOUS at 05:55

## 2017-10-17 RX ADMIN — Medication 650 MILLIGRAM(S): at 07:45

## 2017-10-17 RX ADMIN — Medication 650 MILLIGRAM(S): at 18:51

## 2017-10-17 RX ADMIN — Medication 650 MILLIGRAM(S): at 17:05

## 2017-10-17 RX ADMIN — Medication 81 MILLIGRAM(S): at 12:34

## 2017-10-17 RX ADMIN — BRIMONIDINE TARTRATE 1 DROP(S): 2 SOLUTION/ DROPS OPHTHALMIC at 13:32

## 2017-10-17 RX ADMIN — Medication 650 MILLIGRAM(S): at 23:10

## 2017-10-17 RX ADMIN — PANTOPRAZOLE SODIUM 40 MILLIGRAM(S): 20 TABLET, DELAYED RELEASE ORAL at 18:14

## 2017-10-17 RX ADMIN — PANTOPRAZOLE SODIUM 40 MILLIGRAM(S): 20 TABLET, DELAYED RELEASE ORAL at 05:54

## 2017-10-17 NOTE — PROGRESS NOTE ADULT - ASSESSMENT
83F c hx HLD, CVA c/b dysphagia s/p PEG, grade 1 DHF, LVOT obstruction, GERD, ESRD (T/Th/Sat via left IJ tunneled dialysis cath), left arm vascular stent, hypotension requiring midodrine, presented to [place] for worsening cough for 10 days, transferred to [facility] for NSTEMI mgmt. c/c/b hypotension/bradycardia/ excessive secretions/aspiration

## 2017-10-17 NOTE — PROGRESS NOTE ADULT - ASSESSMENT
Patient interviewed, examined and chart reviewed.  Case discussed with fellow.  Agree w/ Assessment and Plan as outlined.    Yon Guadarrama MD [facility]  P: [phone]  Spectra:  [phone]  Office: [phone]

## 2017-10-17 NOTE — PROGRESS NOTE ADULT - SUBJECTIVE AND OBJECTIVE BOX
Follow-up Pulm Progress Note    feels a bit better today, slept better last night, recieved hycodan x 2 in past 24 hrs, still with excessive oral secretions above base line, chest pain with cough, epigastic discomfort and c/o nausea today    Medications:  MEDICATIONS  (STANDING):  aspirin  chewable 81 milliGRAM(s) Enteral Tube daily  brimonidine 0.2% Ophthalmic Solution 1 Drop(s) Both EYES three times a day  dorzolamide 2%/timolol 0.5% Ophthalmic Solution 1 Drop(s) Both EYES daily  heparin  Injectable 5000 Unit(s) SubCutaneous every 8 hours  influenza   Vaccine 0.5 milliLiter(s) IntraMuscular once  latanoprost 0.005% Ophthalmic Solution 1 Drop(s) Both EYES at bedtime  levoFLOXacin IVPB      midodrine 10 milliGRAM(s) Oral three times a day  Nephro-asim 1 Tablet(s) Oral daily  pantoprazole   Suspension 40 milliGRAM(s) Oral before breakfast  simvastatin 40 milliGRAM(s) Oral at bedtime    MEDICATIONS  (PRN):  acetaminophen    Suspension. 650 milliGRAM(s) Oral every 6 hours PRN pain  ALBUTerol/ipratropium for Nebulization 3 milliLiter(s) Nebulizer every 6 hours PRN Shortness of Breath and/or Wheezing  benzocaine 15 mG/menthol 3.6 mG Lozenge 1 Lozenge Oral every 6 hours PRN Sore Throat  glycopyrrolate Injectable 0.4 milliGRAM(s) IV Push every 6 hours PRN secretions  HYDROcodone/homatropine Syrup 5 milliLiter(s) Oral every 8 hours PRN Cough          Vital Signs Last 24 Hrs  T(C): 36.7 (17 Oct 2017 12:10), Max: 36.7 (16 Oct 2017 13:55)  T(F): 98 (17 Oct 2017 12:10), Max: 98 (16 Oct 2017 13:55)  HR: 71 (17 Oct 2017 12:10) (57 - 84)  BP: 113/63 (17 Oct 2017 12:10) (113/63 - 168/88)  BP(mean): 100 (16 Oct 2017 20:10) (100 - 100)  RR: 18 (17 Oct 2017 12:10) (17 - 18)  SpO2: 99% (17 Oct 2017 12:10) (97% - 99%)          10-16 @ 07:01  -  10-17 @ 07:00  --------------------------------------------------------  IN: 855 mL / OUT: 0 mL / NET: 855 mL          LABS:                        11.6   13.5  )-----------( 222      ( 16 Oct 2017 09:52 )             35.4     10-16    145  |  101  |  78<H>  ----------------------------<  108<H>  4.5   |  23  |  7.22<H>    Ca    10.4      16 Oct 2017 09:52  Phos  4.3     10-16  Mg     2.6     10-16    TPro  6.2  /  Alb  3.4  /  TBili  0.2  /  DBili  x   /  AST  27  /  ALT  24  /  AlkPhos  99  10-16          CAPILLARY BLOOD GLUCOSE            Procalcitonin, Serum: 0.95 ng/mL (10-16-17 @ 11:35)                  CULTURES: (if applicable)  Culture Results:   No growth at 5 days. (10-12 @ 07:42)  Culture Results:   No growth at 5 days. (10-12 @ 07:42)          Physical Examination:  PULM: decreased bs bilaterally-ronchi improved todaoy, no significant sputum production  CVS: S1, S2 heard  GI: epigastric discomfort to palpaation, -guard, -rigid    RADIOLOGY REVIEWED  CXR:     CT chest:< from: CT Chest No Cont (10.13.17 @ 21:16) >  compatible with renal osteodystrophy.    IMPRESSION:   Small bilateral pleural effusions.  Reflux of contrast from stomach into the esophagus, making the patient at   risk for aspiration.   An indeterminate 1.8 cm peripheral left lower lobe nodular opacity.   Recommend nonemergent follow-up chest CT for further evaluation.  Bilateral atrophic kidneys.  Central venous catheter extends into the inferior vena cave at the   confluence of hepatic veins.    < end of copied text > Follow-up Pulm Progress Note    feels a bit better today, slept better last night, recieved hycodan x 2 in past 24 hrs, still with excessive oral secretions above base line, chest pain with cough, epigastic discomfort and c/o nausea today    Medications:  MEDICATIONS  (STANDING):  aspirin  chewable 81 milliGRAM(s) Enteral Tube daily  brimonidine 0.2% Ophthalmic Solution 1 Drop(s) Both EYES three times a day  dorzolamide 2%/timolol 0.5% Ophthalmic Solution 1 Drop(s) Both EYES daily  heparin  Injectable 5000 Unit(s) SubCutaneous every 8 hours  influenza   Vaccine 0.5 milliLiter(s) IntraMuscular once  latanoprost 0.005% Ophthalmic Solution 1 Drop(s) Both EYES at bedtime  levoFLOXacin IVPB      midodrine 10 milliGRAM(s) Oral three times a day  Nephro-asim 1 Tablet(s) Oral daily  pantoprazole   Suspension 40 milliGRAM(s) Oral before breakfast  simvastatin 40 milliGRAM(s) Oral at bedtime    MEDICATIONS  (PRN):  acetaminophen    Suspension. 650 milliGRAM(s) Oral every 6 hours PRN pain  ALBUTerol/ipratropium for Nebulization 3 milliLiter(s) Nebulizer every 6 hours PRN Shortness of Breath and/or Wheezing  benzocaine 15 mG/menthol 3.6 mG Lozenge 1 Lozenge Oral every 6 hours PRN Sore Throat  glycopyrrolate Injectable 0.4 milliGRAM(s) IV Push every 6 hours PRN secretions  HYDROcodone/homatropine Syrup 5 milliLiter(s) Oral every 8 hours PRN Cough    Vital Signs Last 24 Hrs  T(C): 36.7 (17 Oct 2017 12:10), Max: 36.7 (16 Oct 2017 13:55)  T(F): 98 (17 Oct 2017 12:10), Max: 98 (16 Oct 2017 13:55)  HR: 71 (17 Oct 2017 12:10) (57 - 84)  BP: 113/63 (17 Oct 2017 12:10) (113/63 - 168/88)  BP(mean): 100 (16 Oct 2017 20:10) (100 - 100)  RR: 18 (17 Oct 2017 12:10) (17 - 18)  SpO2: 99% (17 Oct 2017 12:10) (97% - 99%)    10-16 @ 07:01  -  10-17 @ 07:00  --------------------------------------------------------  IN: 855 mL / OUT: 0 mL / NET: 855 mL          LABS:                        11.6   13.5  )-----------( 222      ( 16 Oct 2017 09:52 )             35.4     10-16    145  |  101  |  78<H>  ----------------------------<  108<H>  4.5   |  23  |  7.22<H>    Ca    10.4      16 Oct 2017 09:52  Phos  4.3     10-16  Mg     2.6     10-16    TPro  6.2  /  Alb  3.4  /  TBili  0.2  /  DBili  x   /  AST  27  /  ALT  24  /  AlkPhos  99  10-16    CAPILLARY BLOOD GLUCOSE      Procalcitonin, Serum: 0.95 ng/mL (10-16-17 @ 11:35)      CULTURES: (if applicable)  Culture Results:   No growth at 5 days. (10-12 @ 07:42)  Culture Results:   No growth at 5 days. (10-12 @ 07:42)      Physical Examination:  PULM: decreased bs bilaterally-ronchi improved todaoy, no significant sputum production  CVS: S1, S2 heard  GI: epigastric discomfort to palpaation, -guard, -rigid    RADIOLOGY REVIEWED  CXR:     CT chest:< from: CT Chest No Cont (10.13.17 @ 21:16) >  compatible with renal osteodystrophy.    IMPRESSION:   Small bilateral pleural effusions.  Reflux of contrast from stomach into the esophagus, making the patient at   risk for aspiration.   An indeterminate 1.8 cm peripheral left lower lobe nodular opacity.   Recommend nonemergent follow-up chest CT for further evaluation.  Bilateral atrophic kidneys.  Central venous catheter extends into the inferior vena cave at the   confluence of hepatic veins.    < end of copied text >

## 2017-10-17 NOTE — PROGRESS NOTE ADULT - SUBJECTIVE AND OBJECTIVE BOX
Date of Admission: 10/12/17     24H hour events: Still reporting cough although does report feeling slightly better. No CP, palpitations.     MEDICATIONS:  aspirin  chewable 81 milliGRAM(s) Enteral Tube daily  heparin  Injectable 5000 Unit(s) SubCutaneous every 8 hours  midodrine 10 milliGRAM(s) Oral three times a day    levoFLOXacin IVPB        ALBUTerol/ipratropium for Nebulization 3 milliLiter(s) Nebulizer every 6 hours PRN  HYDROcodone/homatropine Syrup 5 milliLiter(s) Oral every 8 hours PRN    acetaminophen    Suspension. 650 milliGRAM(s) Oral every 6 hours PRN    glycopyrrolate Injectable 0.4 milliGRAM(s) IV Push every 6 hours PRN  pantoprazole   Suspension 40 milliGRAM(s) Oral before breakfast    simvastatin 40 milliGRAM(s) Oral at bedtime    benzocaine 15 mG/menthol 3.6 mG Lozenge 1 Lozenge Oral every 6 hours PRN  brimonidine 0.2% Ophthalmic Solution 1 Drop(s) Both EYES three times a day  dorzolamide 2%/timolol 0.5% Ophthalmic Solution 1 Drop(s) Both EYES daily  influenza   Vaccine 0.5 milliLiter(s) IntraMuscular once  latanoprost 0.005% Ophthalmic Solution 1 Drop(s) Both EYES at bedtime  Nephro-asim 1 Tablet(s) Oral daily      REVIEW OF SYSTEMS:    CONSTITUTIONAL: No weakness, fevers or chills  EYES/ENT: No visual changes;  No dysphagia  NECK: No pain or stiffness  RESPIRATORY: + cough; No wheezing, hemoptysis; No shortness of breath  CARDIOVASCULAR: No chest pain or palpitations; No lower extremity edema  GASTROINTESTINAL: No abdominal or epigastric pain. No nausea, vomiting, or hematemesis; No diarrhea or constipation. No melena or hematochezia.  BACK: No back pain  GENITOURINARY: No dysuria, frequency or hematuria  NEUROLOGICAL: No numbness or weakness  SKIN: No itching, burning, rashes, or lesions   All other review of systems is negative unless indicated above.    PHYSICAL EXAM:  T(C): 36.7 (10-17-17 @ 04:55), Max: 36.7 (10-16-17 @ 12:34)  HR: 78 (10-17-17 @ 04:55) (57 - 84)  BP: 129/73 (10-17-17 @ 04:55) (129/73 - 168/88)  RR: 18 (10-17-17 @ 04:55) (17 - 18)  SpO2: 97% (10-17-17 @ 04:55) (97% - 99%)  Wt(kg): --  I&O's Summary    16 Oct 2017 07:01  -  17 Oct 2017 07:00  --------------------------------------------------------  IN: 855 mL / OUT: 0 mL / NET: 855 mL        Appearance: Normal, pleasant elderly woman sitting up in bed watching tv in no distress	  HEENT:   Normal oral mucosa, PERRL, EOMI	  Lymphatic: No lymphadenopathy  Cardiovascular:  No JVD. Normal S1 S2.  I/VI basal LOU. No edema  Respiratory: Lungs clear to auscultation	  Psychiatry: A & O x 3, Mood & affect appropriate  Gastrointestinal:  Soft, Non-tender, + BS	  Skin: No rashes, No ecchymoses, No cyanosis	  Neurologic: Non-focal  Extremities: Normal range of motion, No clubbing, cyanosis or edema  Vascular: Peripheral pulses palpable 2+ bilaterally    LABS:	 	    CBC Full  -  ( 16 Oct 2017 09:52 )  WBC Count : 13.5 K/uL  Hemoglobin : 11.6 g/dL  Hematocrit : 35.4 %  Platelet Count - Automated : 222 K/uL  Mean Cell Volume : 98.9 fl  Mean Cell Hemoglobin : 32.5 pg  Mean Cell Hemoglobin Concentration : 32.9 gm/dL      10-16    145  |  101  |  78<H>  ----------------------------<  108<H>  4.5   |  23  |  7.22<H>    Ca    10.4      16 Oct 2017 09:52  Phos  4.3     10-16  Mg     2.6     10-16    TPro  6.2  /  Alb  3.4  /  TBili  0.2  /  DBili  x   /  AST  27  /  ALT  24  /  AlkPhos  99  10-16    TELEMETRY: 	  SR 60-90s; 4 beats of NSVT       ASSESSMENT/PLAN: 	  82yo woman w/PMHx ESRD, LVOT obstruction/LVH p/w symptoms of URI, atypical chest pain, elevated cardiac biomarkers likely due to fluid retention from missed HD treatment in the setting of ESRD. Course complicated by hypotension, bradycardia and syncope requiring transition to MICU s/p transfer to tele floor (Echo has demonstrated at least moderate A.S. as well as mod to severe LV outflow tract obstruction; LV function preserved (hyperdynamic))    #LVOT obstruction with TRICE  - Recommend starting low dose BB--coreg 3.125mg BID and will continue to monitor telemetry     #Syncope - multifactorial including vagal mediated and LVOT obstruction  - continue monitoring clinically with fall precautions    #Aortic stenosis - on exam she has a mid to late peaking murmur that is difficult to characterize because of the LVOT obstruction. It probably is not severe or critical at this time; No role for further cardiac testing

## 2017-10-17 NOTE — PROGRESS NOTE ADULT - PROBLEM SELECTOR PLAN 3
epigastric pain, unable to manage secretions (above baseline)/worsening dysphagia, GERD-reflux of contrast into esophagus  consider GI consult r/o esophageal dysphagia or esophageal etiology causing increased dysphagia epigastric pain, unable to manage secretions (above baseline)/worsening dysphagia, GERD-reflux of contrast into esophagus  - GI following

## 2017-10-17 NOTE — PROGRESS NOTE ADULT - ASSESSMENT
82 yo lady with PMH: esrd-HD, cva-peg-dysphagia,hypotension-midodrine, GERD, LVOT admitted for cough, sore throat, recent "cold", xray neck revealing mild epiglotitis, and pt having difficulty managing secretions, leukocytosis, epigastric pain, nstemi

## 2017-10-17 NOTE — PROGRESS NOTE ADULT - PROBLEM SELECTOR PLAN 2
-appreciate GI recommendations   -will increase PPI to BID dosing  -will speak with family regarding Reglan, will have to be cautious with QTC monitoring as patient is already on levaquin   -patient is likely poor surgical candidate for fundoplication. Unclear how useful advancing tube to jejunum would be, as reflux and aspiration is still a risk regardless.

## 2017-10-17 NOTE — PROGRESS NOTE ADULT - ASSESSMENT
83F w/ hx of ESRD (on HD Tues/Thurs/Sat), HTN, CVA (~20 years ago; s/p PEG w/ left hemiparesis/dysphagia at baseline), LVOT obstruction, HLD and GERD who presented to [facility] w/ 1 week hx of sore throat/chest pain and transferred to [facility] (10/12) after pt was found to have elevated troponins in the setting of missed HD w/ course c/b hypotension, bradycardia and syncopal event. Transferred to MICU on 10/13 after RRT was called for syncopal event, required phenylephrine while in MICU for hypotension. BP improved and now transferred to the floor:

## 2017-10-17 NOTE — PROGRESS NOTE ADULT - SUBJECTIVE AND OBJECTIVE BOX
patient seen and examined  no complaints  s/p hd yesterday    MSG causes dizziness (Other)  pcn (Hives)  penicillin (Hives)    Hospital Medications:   MEDICATIONS  (STANDING):  aspirin  chewable 81 milliGRAM(s) Enteral Tube daily  brimonidine 0.2% Ophthalmic Solution 1 Drop(s) Both EYES three times a day  dorzolamide 2%/timolol 0.5% Ophthalmic Solution 1 Drop(s) Both EYES daily  heparin  Injectable 5000 Unit(s) SubCutaneous every 8 hours  influenza   Vaccine 0.5 milliLiter(s) IntraMuscular once  latanoprost 0.005% Ophthalmic Solution 1 Drop(s) Both EYES at bedtime  levoFLOXacin IVPB      midodrine 10 milliGRAM(s) Oral three times a day  Nephro-asim 1 Tablet(s) Oral daily  pantoprazole   Suspension 40 milliGRAM(s) Oral before breakfast  simvastatin 40 milliGRAM(s) Oral at bedtime      VITALS:  T(F): 98.2 (10-17-17 @ 13:25), Max: 98.2 (10-17-17 @ 13:25)  HR: 59 (10-17-17 @ 13:25)  BP: 137/71 (10-17-17 @ 13:25)  RR: 18 (10-17-17 @ 13:25)  SpO2: 100% (10-17-17 @ 13:25)  Wt(kg): --    10-16 @ 07:01  -  10-17 @ 07:00  --------------------------------------------------------  IN: 855 mL / OUT: 0 mL / NET: 855 mL    10-17 @ 07:01  -  10-17 @ 14:33  --------------------------------------------------------  IN: 0 mL / OUT: 0 mL / NET: 0 mL        PHYSICAL EXAM:  Constitutional: confused  HEENT: anicteric sclera, oropharynx clear, MMM  Neck: No JVD  Respiratory: b/l rhonchi  Cardiovascular: S1, S2, RRR  Gastrointestinal: BS+, soft, NT/ND  Extremities: No cyanosis or clubbing. No peripheral edema  Neurological: confused  Psychiatric: Normal mood, normal affect  : No CVA tenderness. No blanton.   Skin: No rashes  Vascular Access: left PC    LABS:  10-16    145  |  101  |  78<H>  ----------------------------<  108<H>  4.5   |  23  |  7.22<H>    Ca    10.4      16 Oct 2017 09:52  Phos  4.3     10-16  Mg     2.6     10-16    TPro  6.2  /  Alb  3.4  /  TBili  0.2  /  DBili      /  AST  27  /  ALT  24  /  AlkPhos  99  10-16    Creatinine Trend: 7.22 <--, 5.42 <--, 4.78 <--, 1.34 <--, 4.01 <--, 6.81 <--, 7.29 <--, 7.48 <--, 7.79 <--, 9.04 <--, 11.52 <--, 11.13 <--, 9.74 <--, 10.80 <--                        11.6   13.5  )-----------( 222      ( 16 Oct 2017 09:52 )             35.4     Urine Studies:      RADIOLOGY & ADDITIONAL STUDIES:

## 2017-10-17 NOTE — CONSULT NOTE ADULT - SUBJECTIVE AND OBJECTIVE BOX
Chief Complaint:  Patient is a 83y old  Female who presents with a chief complaint of chest pain, sore throat (12 Oct 2017 03:25)      HPI:    Allergies:  MSG causes dizziness (Other)  pcn (Hives)  penicillin (Hives)      Home Medications:    Hospital Medications:  acetaminophen    Suspension. 650 milliGRAM(s) Oral every 6 hours PRN  ALBUTerol/ipratropium for Nebulization 3 milliLiter(s) Nebulizer every 6 hours PRN  aspirin  chewable 81 milliGRAM(s) Enteral Tube daily  benzocaine 15 mG/menthol 3.6 mG Lozenge 1 Lozenge Oral every 6 hours PRN  brimonidine 0.2% Ophthalmic Solution 1 Drop(s) Both EYES three times a day  dorzolamide 2%/timolol 0.5% Ophthalmic Solution 1 Drop(s) Both EYES daily  glycopyrrolate Injectable 0.4 milliGRAM(s) IV Push every 6 hours PRN  heparin  Injectable 5000 Unit(s) SubCutaneous every 8 hours  HYDROcodone/homatropine Syrup 5 milliLiter(s) Oral every 8 hours PRN  influenza   Vaccine 0.5 milliLiter(s) IntraMuscular once  latanoprost 0.005% Ophthalmic Solution 1 Drop(s) Both EYES at bedtime  levoFLOXacin IVPB      midodrine 10 milliGRAM(s) Oral three times a day  Nephro-asim 1 Tablet(s) Oral daily  pantoprazole   Suspension 40 milliGRAM(s) Oral before breakfast  simvastatin 40 milliGRAM(s) Oral at bedtime      PMHX/PSHX:  Hypotension  Murmur  Osteoporosis  Chronic renal failure  History of CVA (cerebrovascular accident)  Status post insertion of dialysis catheter  S/P gastrostomy  Arteriovenous graft for hemodialysis in place, primary  S/P cataract extraction      Family history:  Family history of breast cancer (Grandparent)  Family history of heart disease  History of hypertension      Social History:     ROS:     General:  No wt loss, fevers, chills, night sweats, fatigue,   Eyes:  Good vision, no reported pain  ENT:  No sore throat, pain, runny nose, dysphagia  CV:  No pain, palpitations, hypo/hypertension  Resp:  No dyspnea, cough, tachypnea, wheezing  GI:  See HPI  :  No pain, bleeding, incontinence, nocturia  Muscle:  No pain, weakness  Neuro:  No weakness, tingling, memory problems  Psych:  No fatigue, insomnia, mood problems, depression  Endocrine:  No polyuria, polydipsia, cold/heat intolerance  Heme:  No petechiae, ecchymosis, easy bruisability  Skin:  No rash, edema      PHYSICAL EXAM:     GENERAL:  Appears stated age, well-groomed, well-nourished, no distress  HEENT:  NC/AT,  conjunctivae clear and pink,  no JVD  CHEST:  Full & symmetric excursion, no increased effort, breath sounds clear  HEART:  Regular rhythm, S1, S2, no murmur/rub/S3/S4, no abdominal bruit, no edema  ABDOMEN:  Soft, non-tender, non-distended, normoactive bowel sounds,  no masses ,  EXTREMITIES:  no cyanosis,clubbing or edema  SKIN:  No rash/erythema/ecchymoses/petechiae/wounds/abscess/warm/dry  NEURO:  Alert, oriented    Vital Signs:  Vital Signs Last 24 Hrs  T(C): 36.7 (17 Oct 2017 04:55), Max: 36.7 (16 Oct 2017 12:34)  T(F): 98 (17 Oct 2017 04:55), Max: 98.1 (16 Oct 2017 12:34)  HR: 78 (17 Oct 2017 04:55) (57 - 84)  BP: 129/73 (17 Oct 2017 04:55) (129/73 - 168/88)  BP(mean): 100 (16 Oct 2017 20:10) (100 - 100)  RR: 18 (17 Oct 2017 04:55) (17 - 18)  SpO2: 97% (17 Oct 2017 04:55) (97% - 99%)  Daily     Daily Weight in k (16 Oct 2017 17:00)    LABS:                        11.6   13.5  )-----------( 222      ( 16 Oct 2017 09:52 )             35.4     10-16    145  |  101  |  78<H>  ----------------------------<  108<H>  4.5   |  23  |  7.22<H>    Ca    10.4      16 Oct 2017 09:52  Phos  4.3     10-16  Mg     2.6     10-16    TPro  6.2  /  Alb  3.4  /  TBili  0.2  /  DBili  x   /  AST  27  /  ALT  24  /  AlkPhos  99  10-16    LIVER FUNCTIONS - ( 16 Oct 2017 09:52 )  Alb: 3.4 g/dL / Pro: 6.2 g/dL / ALK PHOS: 99 U/L / ALT: 24 U/L RC / AST: 27 U/L / GGT: x                   Imaging: Chief Complaint:  Patient is a 83y old  Female who presents with a chief complaint of chest pain, sore throat (12 Oct 2017 03:25)      HPI:    83F with past hx HLD, CVA c/b dysphagia s/p PEG for many years, grade 1 DHF, LVOT obstruction, GERD, ESRD (T/Th/Sat via left IJ tunneled dialysis cath), left arm vascular stent, hypotension requiring midodrine, who initially presented to [place] for worsening cough for 10 days. She was found to have a elevated troponins and was transferred to [facility] for further management of NSTEMI. On 10/13, the day after admission, the patient had a witnessed syncopal event while having a bowel movement with associated sinus bradycardia and hypotension and was transferred to MICU for further management.  She was provided pressor support and midodrine and dialyzed.  Hypotension felt to be 2/2 mod/severe LVOT obstruction.  In terms of her cough, she was initially on azithromycin but this was dc'd on the  due to no evidence of infection.      GI was consulted because on CT chest, the patient was noted to have reflux of contrast from stomach to esophagus despite PPI therapy.  She has a chronic PEG as above for longstanding dysphagia and significant trouble with managing oral secretions (she carries a cup to spit into as she is unable to swallow      Allergies:  MSG causes dizziness (Other)  pcn (Hives)  penicillin (Hives)      Home Medications:    Hospital Medications:  acetaminophen    Suspension. 650 milliGRAM(s) Oral every 6 hours PRN  ALBUTerol/ipratropium for Nebulization 3 milliLiter(s) Nebulizer every 6 hours PRN  aspirin  chewable 81 milliGRAM(s) Enteral Tube daily  benzocaine 15 mG/menthol 3.6 mG Lozenge 1 Lozenge Oral every 6 hours PRN  brimonidine 0.2% Ophthalmic Solution 1 Drop(s) Both EYES three times a day  dorzolamide 2%/timolol 0.5% Ophthalmic Solution 1 Drop(s) Both EYES daily  glycopyrrolate Injectable 0.4 milliGRAM(s) IV Push every 6 hours PRN  heparin  Injectable 5000 Unit(s) SubCutaneous every 8 hours  HYDROcodone/homatropine Syrup 5 milliLiter(s) Oral every 8 hours PRN  influenza   Vaccine 0.5 milliLiter(s) IntraMuscular once  latanoprost 0.005% Ophthalmic Solution 1 Drop(s) Both EYES at bedtime  levoFLOXacin IVPB      midodrine 10 milliGRAM(s) Oral three times a day  Nephro-asim 1 Tablet(s) Oral daily  pantoprazole   Suspension 40 milliGRAM(s) Oral before breakfast  simvastatin 40 milliGRAM(s) Oral at bedtime      PMHX/PSHX:  Hypotension  Murmur  Osteoporosis  Chronic renal failure  History of CVA (cerebrovascular accident)  Status post insertion of dialysis catheter  S/P gastrostomy  Arteriovenous graft for hemodialysis in place, primary  S/P cataract extraction      Family history:  Family history of breast cancer (Grandparent)  Family history of heart disease  History of hypertension      Social History:     ROS:     General:  No wt loss, fevers, chills, night sweats, fatigue,   Eyes:  Good vision, no reported pain  ENT:  No sore throat, pain, runny nose, dysphagia  CV:  No pain, palpitations, hypo/hypertension  Resp:  No dyspnea, cough, tachypnea, wheezing  GI:  See HPI  :  No pain, bleeding, incontinence, nocturia  Muscle:  No pain, weakness  Neuro:  No weakness, tingling, memory problems  Psych:  No fatigue, insomnia, mood problems, depression  Endocrine:  No polyuria, polydipsia, cold/heat intolerance  Heme:  No petechiae, ecchymosis, easy bruisability  Skin:  No rash, edema      PHYSICAL EXAM:     GENERAL:  Appears stated age, well-groomed, well-nourished, no distress  HEENT:  NC/AT,  conjunctivae clear and pink,  no JVD  CHEST:  Full & symmetric excursion, no increased effort, breath sounds clear  HEART:  Regular rhythm, S1, S2, no murmur/rub/S3/S4, no abdominal bruit, no edema  ABDOMEN:  Soft, non-tender, non-distended, normoactive bowel sounds,  no masses ,  EXTREMITIES:  no cyanosis,clubbing or edema  SKIN:  No rash/erythema/ecchymoses/petechiae/wounds/abscess/warm/dry  NEURO:  Alert, oriented    Vital Signs:  Vital Signs Last 24 Hrs  T(C): 36.7 (17 Oct 2017 04:55), Max: 36.7 (16 Oct 2017 12:34)  T(F): 98 (17 Oct 2017 04:55), Max: 98.1 (16 Oct 2017 12:34)  HR: 78 (17 Oct 2017 04:55) (57 - 84)  BP: 129/73 (17 Oct 2017 04:55) (129/73 - 168/88)  BP(mean): 100 (16 Oct 2017 20:10) (100 - 100)  RR: 18 (17 Oct 2017 04:55) (17 - 18)  SpO2: 97% (17 Oct 2017 04:55) (97% - 99%)  Daily     Daily Weight in k (16 Oct 2017 17:00)    LABS:                        11.6   13.5  )-----------( 222      ( 16 Oct 2017 09:52 )             35.4     10-16    145  |  101  |  78<H>  ----------------------------<  108<H>  4.5   |  23  |  7.22<H>    Ca    10.4      16 Oct 2017 09:52  Phos  4.3     10-16  Mg     2.6     10-16    TPro  6.2  /  Alb  3.4  /  TBili  0.2  /  DBili  x   /  AST  27  /  ALT  24  /  AlkPhos  99  10-16    LIVER FUNCTIONS - ( 16 Oct 2017 09:52 )  Alb: 3.4 g/dL / Pro: 6.2 g/dL / ALK PHOS: 99 U/L / ALT: 24 U/L RC / AST: 27 U/L / GGT: x                   Imaging: Chief Complaint:  Patient is a 83y old  Female who presents with a chief complaint of chest pain, sore throat (12 Oct 2017 03:25)      HPI:    83F with past hx HLD, CVA c/b dysphagia s/p PEG for many years, grade 1 DHF, LVOT obstruction, GERD, ESRD (T/Th/Sat via left IJ tunneled dialysis cath), left arm vascular stent, hypotension requiring midodrine, who initially presented to [place] for worsening cough for 10 days. She was found to have a elevated troponins and was transferred to [facility] for further management of NSTEMI. On 10/13, the day after admission, the patient had a witnessed syncopal event while having a bowel movement with associated sinus bradycardia and hypotension and was transferred to MICU for further management.  She was provided pressor support and midodrine and dialyzed.  Hypotension felt to be 2/2 mod/severe LVOT obstruction.  In terms of her cough, she was initially on azithromycin but this was dc'd on the  due to no evidence of infection.      GI was consulted because on CT chest, the patient was noted to have reflux of contrast from stomach to esophagus despite PPI therapy.  She has a chronic PEG as above for longstanding dysphagia and significant trouble with managing oral secretions (she carries a cup to spit into as she is unable to swallow and has significant oral secretions).  Speech therapy deferred swallow eval given longstanding history of dysphagia      Allergies:  MSG causes dizziness (Other)  pcn (Hives)  penicillin (Hives)      Home Medications:    Hospital Medications:  acetaminophen    Suspension. 650 milliGRAM(s) Oral every 6 hours PRN  ALBUTerol/ipratropium for Nebulization 3 milliLiter(s) Nebulizer every 6 hours PRN  aspirin  chewable 81 milliGRAM(s) Enteral Tube daily  benzocaine 15 mG/menthol 3.6 mG Lozenge 1 Lozenge Oral every 6 hours PRN  brimonidine 0.2% Ophthalmic Solution 1 Drop(s) Both EYES three times a day  dorzolamide 2%/timolol 0.5% Ophthalmic Solution 1 Drop(s) Both EYES daily  glycopyrrolate Injectable 0.4 milliGRAM(s) IV Push every 6 hours PRN  heparin  Injectable 5000 Unit(s) SubCutaneous every 8 hours  HYDROcodone/homatropine Syrup 5 milliLiter(s) Oral every 8 hours PRN  influenza   Vaccine 0.5 milliLiter(s) IntraMuscular once  latanoprost 0.005% Ophthalmic Solution 1 Drop(s) Both EYES at bedtime  levoFLOXacin IVPB      midodrine 10 milliGRAM(s) Oral three times a day  Nephro-asim 1 Tablet(s) Oral daily  pantoprazole   Suspension 40 milliGRAM(s) Oral before breakfast  simvastatin 40 milliGRAM(s) Oral at bedtime      PMHX/PSHX:  Hypotension  Murmur  Osteoporosis  Chronic renal failure  History of CVA (cerebrovascular accident)  Status post insertion of dialysis catheter  S/P gastrostomy  Arteriovenous graft for hemodialysis in place, primary  S/P cataract extraction      Family history:  Family history of breast cancer (Grandparent)  Family history of heart disease  History of hypertension      Social History: Lives with son and two daughters.  Walks around short distances.  No tob/etoh/drugs      ROS: patient asleep, unable to obtain      PHYSICAL EXAM:     GENERAL:  NAD  HEENT:  NC/AT,  conjunctivae clear and pink,  no JVD, copious oral secretions pooling in mouth, dripping out  CHEST:  Full & symmetric excursion, no increased effort  HEART:  Regular rhythm, S1, S2,  ABDOMEN:  Soft, non-tender, non-distended, PEG in place, c/d/i  EXTREMITIES:  no cyanosis,clubbing or edema  SKIN:  No rash  NEURO asleep, arousable to voice    Vital Signs:  Vital Signs Last 24 Hrs  T(C): 36.7 (17 Oct 2017 04:55), Max: 36.7 (16 Oct 2017 12:34)  T(F): 98 (17 Oct 2017 04:55), Max: 98.1 (16 Oct 2017 12:34)  HR: 78 (17 Oct 2017 04:55) (57 - 84)  BP: 129/73 (17 Oct 2017 04:55) (129/73 - 168/88)  BP(mean): 100 (16 Oct 2017 20:10) (100 - 100)  RR: 18 (17 Oct 2017 04:55) (17 - 18)  SpO2: 97% (17 Oct 2017 04:55) (97% - 99%)  Daily     Daily Weight in k (16 Oct 2017 17:00)    LABS:                        11.6   13.5  )-----------( 222      ( 16 Oct 2017 09:52 )             35.4     10-16    145  |  101  |  78<H>  ----------------------------<  108<H>  4.5   |  23  |  7.22<H>    Ca    10.4      16 Oct 2017 09:52  Phos  4.3     10-  Mg     2.6     10-16    TPro  6.2  /  Alb  3.4  /  TBili  0.2  /  DBili  x   /  AST  27  /  ALT  24  /  AlkPhos  99  10-    LIVER FUNCTIONS - ( 16 Oct 2017 09:52 )  Alb: 3.4 g/dL / Pro: 6.2 g/dL / ALK PHOS: 99 U/L / ALT: 24 U/L RC / AST: 27 U/L / GGT: x                   Imaging:  < from: Xray Neck Soft Tissue (10.11.17 @ 16:23) >    EXAM:  NECK SOFT TISSUE    AP_LAT                            PROCEDURE DATE:  10/11/2017          INTERPRETATION:  Soft tissue throughout.    Findings.    AP and lateral soft tissue neck were obtained.     There is mild fullness of the epiglottis on lateral radiograph.   Prevertebral soft tissues are unremarkable.    Permacath in situ.    IMPRESSION:     Mild fullness of the epiglottis on the frontal view without   hyperinflation of the pharynx. This may represent partial volume   averaging. CTimaging of the neck may be considered for further   assessment.        < end of copied text >    < from: Xray Chest 1 View AP- PORTABLE-Urgent (10.11.17 @ 22:09) >  EXAM:  CHEST-PORTABLE URGENT                            PROCEDURE DATE:  10/11/2017            INTERPRETATION:  CLINICAL INDICATION: Chest pain    TECHNIQUE: Frontal view of the chest from 10/11/2017 at 2205 hours    COMPARISON: Radiograph of the chest from 10/11/2017 at 1605 hours and   3/4/2017.    FINDINGS:     There are bilateral upper extremity vascular stents. A left-sided   dialysis catheter terminates with the tip in the right atrium, unchanged   from 3/4/2017.  The lungs are clear.  There is no pleural effusion or pneumothorax.  The heart size is normal.  There is no acute osseous abnormality.    IMPRESSION:    No acute disease.      < end of copied text >    < from: CT Chest No Cont (10.13.17 @ 21:16) >  EXAM:  CT CHEST                          EXAM:  CT ABDOMEN AND PELVIS OC                            PROCEDURE DATE:  10/13/2017            INTERPRETATION:  CLINICAL INFORMATION: Abdominal pain. CVA and dysphagia.    COMPARISON: CT abdomen pelvis 2017 and CT chest 2017.    PROCEDURE:   CT of the Chest, Abdomen and Pelvis was performed without intravenous   contrast.   Intravenous contrast: None.  Oral contrast: positive contrast was administered.  Sagittal and coronal reformats were performed.    FINDINGS:    CHEST:     LUNGS AND LARGE AIRWAYS: Mucosal debris in the trachea and bilateral   mainstem bronchi. Bibasilar subsegmental atelectasis. Peripheral nodular   left lower lobe opacity measures 1.8 cm (2, 44). This may represent an  area of focal atelectasis. There was a similar though smaller area of   atelectasis noted on the patient's previous study. Suggest nonemergent   follow-up CT post resolution of patient's acute symptoms. Left lower lobe   calcified granuloma.  PLEURA: Small bilateral pleural effusions.  VESSELS: Atheromatous disease of the aorta and coronary arteries.   Bilateral axillary stents.  HEART: Heart size is normal.No pericardial effusion.  MEDIASTINUM AND YURI: No lymphadenopathy.  CHEST WALL AND LOWER NECK: Left-sided central venous catheter. Extends   into the inferior vena cava at the junction of the hepatic veins.    ABDOMEN AND PELVIS:    LIVER: Hepatic cysts and subcentimeter hypodense hepatic foci too small   to characterize.  BILE DUCTS: Normal caliber.  GALLBLADDER: Within normal limits.  SPLEEN: Within normal limits.  PANCREAS: Within normal limits.  ADRENALS: Within normal limits.  KIDNEYS/URETERS: Atrophic kidneys with multiple scattered nonobstructing   stones and cystic lesions as on prior exam. Hyperdense left lower pole   lesion measuring 1 cm is compatible with a hemorrhagic or proteinaceous   cyst. No hydronephrosis.    BLADDER: Incompletely distended.  REPRODUCTIVE ORGANS: Uterus and adnexa are within normal limits.    BOWEL: Percutaneous gastrostomy tube terminates in the stomach. Reflux of   oral contrast from stomach into the esophagus. No bowel obstruction.   Appendix is normal.  PERITONEUM: No ascites.  VESSELS:  Atheromatous disease of the aorta and iliac vessels.  RETROPERITONEUM: No lymphadenopathy.    ABDOMINAL WALL: Percutaneous gastrostomy tube.  BONES: Sclerotic appearance of the visualized osseous structures   compatible with renal osteodystrophy.    IMPRESSION:   Small bilateral pleural effusions.  Reflux of contrast from stomach into the esophagus, making the patient at   risk for aspiration.   An indeterminate 1.8 cm peripheral left lower lobe nodular opacity.   Recommend nonemergent follow-up chest CT for further evaluation.  Bilateral atrophic kidneys.  Central venous catheter extends into the inferior vena cave at the   confluence of hepatic veins.    < end of copied text > Chief Complaint:  Patient is a 83y old  Female who presents with a chief complaint of chest pain, sore throat (12 Oct 2017 03:25)      HPI:    83F with past hx HLD, CVA c/b dysphagia s/p PEG for many years, diastolic CHF, LVOT obstruction, GERD, ESRD (T/Th/Sat via left IJ tunneled dialysis cath), left arm vascular stent, hypotension requiring midodrine, who initially presented to [place] for worsening cough for 10 days. She was found to have a elevated troponins and was transferred to [facility] for further management of NSTEMI. On 10/13, the day after admission, the patient had a witnessed syncopal event while having a bowel movement with associated sinus bradycardia and hypotension and was transferred to MICU for further management.  She was provided pressor support and midodrine and dialyzed.  Hypotension felt to be 2/2 mod/severe LVOT obstruction.  In terms of her cough, she was initially on azithromycin but this was dc'd on the  due to no evidence of infection.      GI was consulted because on CT chest, the patient was noted to have reflux of contrast from stomach to esophagus despite PPI therapy.  She has a chronic PEG as above for longstanding dysphagia and significant trouble with managing oral secretions (she carries a cup to spit into as she is unable to swallow and has significant oral secretions).  Speech therapy deferred swallow eval given longstanding history of dysphagia      Allergies:  MSG causes dizziness (Other)  pcn (Hives)  penicillin (Hives)      Home Medications:    Hospital Medications:  acetaminophen    Suspension. 650 milliGRAM(s) Oral every 6 hours PRN  ALBUTerol/ipratropium for Nebulization 3 milliLiter(s) Nebulizer every 6 hours PRN  aspirin  chewable 81 milliGRAM(s) Enteral Tube daily  benzocaine 15 mG/menthol 3.6 mG Lozenge 1 Lozenge Oral every 6 hours PRN  brimonidine 0.2% Ophthalmic Solution 1 Drop(s) Both EYES three times a day  dorzolamide 2%/timolol 0.5% Ophthalmic Solution 1 Drop(s) Both EYES daily  glycopyrrolate Injectable 0.4 milliGRAM(s) IV Push every 6 hours PRN  heparin  Injectable 5000 Unit(s) SubCutaneous every 8 hours  HYDROcodone/homatropine Syrup 5 milliLiter(s) Oral every 8 hours PRN  influenza   Vaccine 0.5 milliLiter(s) IntraMuscular once  latanoprost 0.005% Ophthalmic Solution 1 Drop(s) Both EYES at bedtime  levoFLOXacin IVPB      midodrine 10 milliGRAM(s) Oral three times a day  Nephro-asim 1 Tablet(s) Oral daily  pantoprazole   Suspension 40 milliGRAM(s) Oral before breakfast  simvastatin 40 milliGRAM(s) Oral at bedtime      PMHX/PSHX:  Hypotension  Murmur  Osteoporosis  Chronic renal failure  History of CVA (cerebrovascular accident)  Status post insertion of dialysis catheter  S/P gastrostomy  Arteriovenous graft for hemodialysis in place, primary  S/P cataract extraction      Family history:  Family history of breast cancer (Grandparent)  Family history of heart disease  History of hypertension      Social History: Lives with son and two daughters.  Walks around short distances.  No tob/etoh/drugs      ROS: patient asleep, unable to obtain      PHYSICAL EXAM:     GENERAL:  NAD  HEENT:  NC/AT,  conjunctivae clear and pink,  no JVD, copious oral secretions pooling in mouth, dripping out  CHEST:  Full & symmetric excursion, no increased effort  HEART:  Regular rhythm, S1, S2,  ABDOMEN:  Soft, non-tender, non-distended, PEG in place, c/d/i  EXTREMITIES:  no cyanosis,clubbing or edema  SKIN:  No rash  NEURO asleep, arousable to voice    Vital Signs:  Vital Signs Last 24 Hrs  T(C): 36.7 (17 Oct 2017 04:55), Max: 36.7 (16 Oct 2017 12:34)  T(F): 98 (17 Oct 2017 04:55), Max: 98.1 (16 Oct 2017 12:34)  HR: 78 (17 Oct 2017 04:55) (57 - 84)  BP: 129/73 (17 Oct 2017 04:55) (129/73 - 168/88)  BP(mean): 100 (16 Oct 2017 20:10) (100 - 100)  RR: 18 (17 Oct 2017 04:55) (17 - 18)  SpO2: 97% (17 Oct 2017 04:55) (97% - 99%)  Daily     Daily Weight in k (16 Oct 2017 17:00)    LABS:                        11.6   13.5  )-----------( 222      ( 16 Oct 2017 09:52 )             35.4     10-16    145  |  101  |  78<H>  ----------------------------<  108<H>  4.5   |  23  |  7.22<H>    Ca    10.4      16 Oct 2017 09:52  Phos  4.3     10-16  Mg     2.6     10-16    TPro  6.2  /  Alb  3.4  /  TBili  0.2  /  DBili  x   /  AST  27  /  ALT  24  /  AlkPhos  99  10-16    LIVER FUNCTIONS - ( 16 Oct 2017 09:52 )  Alb: 3.4 g/dL / Pro: 6.2 g/dL / ALK PHOS: 99 U/L / ALT: 24 U/L RC / AST: 27 U/L / GGT: x                   Imaging:  < from: Xray Neck Soft Tissue (10.11.17 @ 16:23) >    EXAM:  NECK SOFT TISSUE    AP_LAT                            PROCEDURE DATE:  10/11/2017          INTERPRETATION:  Soft tissue throughout.    Findings.    AP and lateral soft tissue neck were obtained.     There is mild fullness of the epiglottis on lateral radiograph.   Prevertebral soft tissues are unremarkable.    Permacath in situ.    IMPRESSION:     Mild fullness of the epiglottis on the frontal view without   hyperinflation of the pharynx. This may represent partial volume   averaging. CTimaging of the neck may be considered for further   assessment.        < end of copied text >    < from: Xray Chest 1 View AP- PORTABLE-Urgent (10.11.17 @ 22:09) >  EXAM:  CHEST-PORTABLE URGENT                            PROCEDURE DATE:  10/11/2017            INTERPRETATION:  CLINICAL INDICATION: Chest pain    TECHNIQUE: Frontal view of the chest from 10/11/2017 at 2205 hours    COMPARISON: Radiograph of the chest from 10/11/2017 at 1605 hours and   3/4/2017.    FINDINGS:     There are bilateral upper extremity vascular stents. A left-sided   dialysis catheter terminates with the tip in the right atrium, unchanged   from 3/4/2017.  The lungs are clear.  There is no pleural effusion or pneumothorax.  The heart size is normal.  There is no acute osseous abnormality.    IMPRESSION:    No acute disease.      < end of copied text >    < from: CT Chest No Cont (10.13.17 @ 21:16) >  EXAM:  CT CHEST                          EXAM:  CT ABDOMEN AND PELVIS OC                            PROCEDURE DATE:  10/13/2017            INTERPRETATION:  CLINICAL INFORMATION: Abdominal pain. CVA and dysphagia.    COMPARISON: CT abdomen pelvis 2017 and CT chest 2017.    PROCEDURE:   CT of the Chest, Abdomen and Pelvis was performed without intravenous   contrast.   Intravenous contrast: None.  Oral contrast: positive contrast was administered.  Sagittal and coronal reformats were performed.    FINDINGS:    CHEST:     LUNGS AND LARGE AIRWAYS: Mucosal debris in the trachea and bilateral   mainstem bronchi. Bibasilar subsegmental atelectasis. Peripheral nodular   left lower lobe opacity measures 1.8 cm (2, 44). This may represent an  area of focal atelectasis. There was a similar though smaller area of   atelectasis noted on the patient's previous study. Suggest nonemergent   follow-up CT post resolution of patient's acute symptoms. Left lower lobe   calcified granuloma.  PLEURA: Small bilateral pleural effusions.  VESSELS: Atheromatous disease of the aorta and coronary arteries.   Bilateral axillary stents.  HEART: Heart size is normal.No pericardial effusion.  MEDIASTINUM AND YURI: No lymphadenopathy.  CHEST WALL AND LOWER NECK: Left-sided central venous catheter. Extends   into the inferior vena cava at the junction of the hepatic veins.    ABDOMEN AND PELVIS:    LIVER: Hepatic cysts and subcentimeter hypodense hepatic foci too small   to characterize.  BILE DUCTS: Normal caliber.  GALLBLADDER: Within normal limits.  SPLEEN: Within normal limits.  PANCREAS: Within normal limits.  ADRENALS: Within normal limits.  KIDNEYS/URETERS: Atrophic kidneys with multiple scattered nonobstructing   stones and cystic lesions as on prior exam. Hyperdense left lower pole   lesion measuring 1 cm is compatible with a hemorrhagic or proteinaceous   cyst. No hydronephrosis.    BLADDER: Incompletely distended.  REPRODUCTIVE ORGANS: Uterus and adnexa are within normal limits.    BOWEL: Percutaneous gastrostomy tube terminates in the stomach. Reflux of   oral contrast from stomach into the esophagus. No bowel obstruction.   Appendix is normal.  PERITONEUM: No ascites.  VESSELS:  Atheromatous disease of the aorta and iliac vessels.  RETROPERITONEUM: No lymphadenopathy.    ABDOMINAL WALL: Percutaneous gastrostomy tube.  BONES: Sclerotic appearance of the visualized osseous structures   compatible with renal osteodystrophy.    IMPRESSION:   Small bilateral pleural effusions.  Reflux of contrast from stomach into the esophagus, making the patient at   risk for aspiration.   An indeterminate 1.8 cm peripheral left lower lobe nodular opacity.   Recommend nonemergent follow-up chest CT for further evaluation.  Bilateral atrophic kidneys.  Central venous catheter extends into the inferior vena cave at the   confluence of hepatic veins.    < end of copied text >

## 2017-10-17 NOTE — CHART NOTE - NSCHARTNOTEFT_GEN_A_CORE
Went over GI recommendations with patient and her son who was present at bedside. Will increase protonix to BID. Offered patient reglan, which she declined. She appears comfortable for now. Would like to continue with current therapy.

## 2017-10-17 NOTE — PROGRESS NOTE ADULT - SUBJECTIVE AND OBJECTIVE BOX
Patient is a 83y old  Female who presents with a chief complaint of chest pain, sore throat (12 Oct 2017 03:25)      SUBJECTIVE / OVERNIGHT EVENTS: coughing and sputum production have decreased overnight, better today. Still spitting up secretions, however better compared to before. Patient was hypotensive during dialysis yesterday.     MEDICATIONS  (STANDING):  aspirin  chewable 81 milliGRAM(s) Enteral Tube daily  brimonidine 0.2% Ophthalmic Solution 1 Drop(s) Both EYES three times a day  dorzolamide 2%/timolol 0.5% Ophthalmic Solution 1 Drop(s) Both EYES daily  heparin  Injectable 5000 Unit(s) SubCutaneous every 8 hours  influenza   Vaccine 0.5 milliLiter(s) IntraMuscular once  latanoprost 0.005% Ophthalmic Solution 1 Drop(s) Both EYES at bedtime  levoFLOXacin IVPB      midodrine 10 milliGRAM(s) Oral three times a day  Nephro-asim 1 Tablet(s) Oral daily  pantoprazole   Suspension 40 milliGRAM(s) Oral before breakfast  simvastatin 40 milliGRAM(s) Oral at bedtime    MEDICATIONS  (PRN):  acetaminophen    Suspension. 650 milliGRAM(s) Oral every 6 hours PRN pain  ALBUTerol/ipratropium for Nebulization 3 milliLiter(s) Nebulizer every 6 hours PRN Shortness of Breath and/or Wheezing  benzocaine 15 mG/menthol 3.6 mG Lozenge 1 Lozenge Oral every 6 hours PRN Sore Throat  glycopyrrolate Injectable 0.4 milliGRAM(s) IV Push every 6 hours PRN secretions  HYDROcodone/homatropine Syrup 5 milliLiter(s) Oral every 8 hours PRN Cough      Vital Signs Last 24 Hrs  T(C): 36.8 (17 Oct 2017 13:25), Max: 36.8 (17 Oct 2017 13:25)  T(F): 98.2 (17 Oct 2017 13:25), Max: 98.2 (17 Oct 2017 13:25)  HR: 59 (17 Oct 2017 13:25) (57 - 84)  BP: 137/71 (17 Oct 2017 13:25) (113/63 - 159/83)  BP(mean): 100 (16 Oct 2017 20:10) (100 - 100)  RR: 18 (17 Oct 2017 13:25) (18 - 18)  SpO2: 100% (17 Oct 2017 13:25) (97% - 100%)  CAPILLARY BLOOD GLUCOSE        I&O's Summary    16 Oct 2017 07:01  -  17 Oct 2017 07:00  --------------------------------------------------------  IN: 855 mL / OUT: 0 mL / NET: 855 mL    17 Oct 2017 07:01  -  17 Oct 2017 14:38  --------------------------------------------------------  IN: 0 mL / OUT: 0 mL / NET: 0 mL        PHYSICAL EXAM:    GENERAL: NAD, resting comfortably   HEAD:  Atraumatic, Normocephalic  EYES: EOMI, PERRLA, conjunctiva and sclera clear  NECK: Supple, No JVD  CHEST/LUNG: +coarse ronchi, no wheezing, moving air well b/l   HEART: Regular rate and rhythm; +S1/S2  ABDOMEN: Soft, Nontender, Nondistended; Bowel sounds present. peg in place.   EXTREMITIES:  2+ Peripheral Pulses, No clubbing, cyanosis. +nonpitting pedal edema b/l.   PSYCH: AAOx3  NEUROLOGY: non-focal    LABS:                        11.6   13.5  )-----------( 222      ( 16 Oct 2017 09:52 )             35.4     10-16    145  |  101  |  78<H>  ----------------------------<  108<H>  4.5   |  23  |  7.22<H>    Ca    10.4      16 Oct 2017 09:52  Phos  4.3     10-16  Mg     2.6     10-16    TPro  6.2  /  Alb  3.4  /  TBili  0.2  /  DBili  x   /  AST  27  /  ALT  24  /  AlkPhos  99  10-16            Consultant(s) Notes Reviewed:  Cardiology, GI, Renal

## 2017-10-17 NOTE — SWALLOW BEDSIDE ASSESSMENT ADULT - SWALLOW EVAL: DIAGNOSIS
Case d/w NPs Hilda and Susan. Per NP: Hilda, attending MD: Jessica deferring dysphagia w/u and GI to be consulted. Order for bedside swallow evaluation to be discontinued. This service will not actively follow, re-consult if clinically appropriate.
Consult for bedside swallow evaluation received and appreciated. Chart reviewed and case d/w NP: Hilda who reported order for exam to be d/cd as dysphagia w/u not indicated at present time (pt with longstanding h/o PEG and baseline reduced secretion management->noted expectorating oral secretions at baseline). Given above, this service will not actively follow, please re-consult as clinically appropriate.

## 2017-10-17 NOTE — PROGRESS NOTE ADULT - PROBLEM SELECTOR PLAN 2
CT chest with LLL opacity w air bronchograms  proc .95  wbc rising  levaquin day #2 CT chest with LLL opacity w air bronchograms  proc .95  wbc rising  levaquin

## 2017-10-17 NOTE — CONSULT NOTE ADULT - ASSESSMENT
Impression:  82yo F with history of stroke complicated by dysphagia requiring PEG tube who presented to [facility] for r/o NSTEMI and upper respiratory illness.  GI consulted for concern for esophageal reflux on CT scan as well as copious oral secretions.    Problem List:  1) Reflux of stomach contents visualized on CT, history of GERD on once daily high dose PPI  2) Oropharyngeal dysphagia with copious oral secretions, s/p PEG   3) Cough - concern for aspiration PNA vs viral illness  4) ESRD on HD with associated hypotension  5)  Chronic diastolic heart failure    Plan:  - increase Protonix to BID dosing  - can add prokinetic such as Reglan if QTc not prolonged or other contraindications; has been shown to increase LES pressure, enhance esophageal peristalsis and improve gastric emptying  - if Reglan not effective, can consider Baclofen as well; it would be off label use in GERD but has been shown to reduce transient LES relaxations  - Nissen fundoplication would be definitive therapy for esophageal reflux, however, patient not the best surgical candidate  - Can discuss possible option of G-J feeding tube, however, the risk of aspiration of stomach contents still will be present and given the patient's severe inability to manage copious oral secretions, the aspiration of saliva is likely her biggest aspiration risk.   - would manage oral secretions with anti-cholinergics; scopolamine, glycopyrrolate; aggressive suctioning Impression:  84yo F with history of CVA c/b dysphagia s/p PEG for many years, diastolic CHF, LVOT obstruction, GERD, ESRD (T/Th/Sat via left IJ tunneled dialysis cath), left arm vascular stent, hypotension requiring midodrine, and stroke complicated by dysphagia requiring PEG tube who presented to [facility] for r/o NSTEMI and upper respiratory illness.  GI consulted for concern for esophageal reflux on CT scan as well as copious oral secretions.    Problem List:  1) Reflux of stomach contents visualized on CT, history of GERD on once daily high dose PPI  2) Oropharyngeal dysphagia with copious oral secretions, s/p PEG   3) Cough - concern for aspiration PNA vs viral illness  4) ESRD on HD with associated hypotension  5)  Chronic diastolic heart failure    Plan:  - increase Protonix to BID dosing  - can add prokinetic such as Reglan if QTc not prolonged or other contraindications; has been shown to increase LES pressure, enhance esophageal peristalsis and improve gastric emptying  - if Reglan not effective, can consider Baclofen as well; it would be off label use in GERD but has been shown to reduce transient LES relaxations  - Nissen fundoplication would be definitive therapy for esophageal reflux, however, patient not the best surgical candidate  - Can discuss possible option of G-J feeding tube, however, the risk of aspiration of stomach contents still will be present and given the patient's severe inability to manage copious oral secretions, the aspiration of saliva is likely her biggest aspiration risk.   - would manage oral secretions with anti-cholinergics; scopolamine, glycopyrrolate; aggressive suctioning

## 2017-10-18 LAB
HCT VFR BLD CALC: 25 % — LOW (ref 34.5–45)
HGB BLD-MCNC: 8.1 G/DL — LOW (ref 11.5–15.5)

## 2017-10-18 PROCEDURE — 99232 SBSQ HOSP IP/OBS MODERATE 35: CPT | Mod: GC

## 2017-10-18 PROCEDURE — 99233 SBSQ HOSP IP/OBS HIGH 50: CPT

## 2017-10-18 RX ORDER — ERYTHROPOIETIN 10000 [IU]/ML
4000 INJECTION, SOLUTION INTRAVENOUS; SUBCUTANEOUS
Qty: 0 | Refills: 0 | Status: DISCONTINUED | OUTPATIENT
Start: 2017-10-18 | End: 2017-10-22

## 2017-10-18 RX ADMIN — Medication 650 MILLIGRAM(S): at 00:10

## 2017-10-18 RX ADMIN — BRIMONIDINE TARTRATE 1 DROP(S): 2 SOLUTION/ DROPS OPHTHALMIC at 23:25

## 2017-10-18 RX ADMIN — MIDODRINE HYDROCHLORIDE 10 MILLIGRAM(S): 2.5 TABLET ORAL at 18:37

## 2017-10-18 RX ADMIN — HEPARIN SODIUM 5000 UNIT(S): 5000 INJECTION INTRAVENOUS; SUBCUTANEOUS at 05:49

## 2017-10-18 RX ADMIN — ERYTHROPOIETIN 4000 UNIT(S): 10000 INJECTION, SOLUTION INTRAVENOUS; SUBCUTANEOUS at 19:08

## 2017-10-18 RX ADMIN — BRIMONIDINE TARTRATE 1 DROP(S): 2 SOLUTION/ DROPS OPHTHALMIC at 05:49

## 2017-10-18 RX ADMIN — ROBINUL 0.4 MILLIGRAM(S): 0.2 INJECTION INTRAMUSCULAR; INTRAVENOUS at 18:15

## 2017-10-18 RX ADMIN — PANTOPRAZOLE SODIUM 40 MILLIGRAM(S): 20 TABLET, DELAYED RELEASE ORAL at 05:49

## 2017-10-18 RX ADMIN — HEPARIN SODIUM 5000 UNIT(S): 5000 INJECTION INTRAVENOUS; SUBCUTANEOUS at 13:08

## 2017-10-18 RX ADMIN — MIDODRINE HYDROCHLORIDE 10 MILLIGRAM(S): 2.5 TABLET ORAL at 13:08

## 2017-10-18 RX ADMIN — Medication 650 MILLIGRAM(S): at 23:26

## 2017-10-18 RX ADMIN — SIMVASTATIN 40 MILLIGRAM(S): 20 TABLET, FILM COATED ORAL at 23:26

## 2017-10-18 RX ADMIN — PANTOPRAZOLE SODIUM 40 MILLIGRAM(S): 20 TABLET, DELAYED RELEASE ORAL at 13:09

## 2017-10-18 RX ADMIN — LATANOPROST 1 DROP(S): 0.05 SOLUTION/ DROPS OPHTHALMIC; TOPICAL at 23:25

## 2017-10-18 RX ADMIN — Medication 81 MILLIGRAM(S): at 13:08

## 2017-10-18 RX ADMIN — BRIMONIDINE TARTRATE 1 DROP(S): 2 SOLUTION/ DROPS OPHTHALMIC at 13:08

## 2017-10-18 RX ADMIN — Medication 3 MILLILITER(S): at 00:01

## 2017-10-18 RX ADMIN — Medication 1 TABLET(S): at 13:08

## 2017-10-18 RX ADMIN — DORZOLAMIDE HYDROCHLORIDE TIMOLOL MALEATE 1 DROP(S): 20; 5 SOLUTION/ DROPS OPHTHALMIC at 13:08

## 2017-10-18 NOTE — PROGRESS NOTE ADULT - ASSESSMENT
83F c hx HLD, CVA c/b dysphagia s/p PEG, grade 1 DHF, LVOT obstruction, GERD, ESRD (T/Th/Sat via left IJ tunneled dialysis cath), left arm vascular stent, hypotension requiring midodrine, presented to [place] for worsening cough for 10 days, transferred to [facility] for NSTEMI mgmt. c/c/b hypotension/bradycardia/ excessive secretions/aspiration

## 2017-10-18 NOTE — PROGRESS NOTE ADULT - SUBJECTIVE AND OBJECTIVE BOX
Interval History: Denies any chest pain, palpitations, dyspnea overnight.     Review Of Systems:  Constitutional: [ ] Fever [ ] Chills [ ] Fatigue [ ] Weight change   HEENT: [ ] Blurred vision [ ] Eye Pain [ ] Headache [ ] Runny nose [ ] Sore Throat   Respiratory: [ ] Cough [ ] Wheezing [ ] Shortness of breath  Cardiovascular: [ ] Chest Pain [ ] Palpitations [ ] CHICAS [ ] PND [ ] Orthopnea  Gastrointestinal: [ ] Abdominal Pain [ ] Diarrhea [ ] Constipation [ ] Hemorrhoids [ ] Nausea [ ] Vomiting  Genitourinary: [ ] Nocturia [ ] Dysuria [ ] Incontinence  Extremities: [ ] Swelling [ ] Joint Pain  Neurologic: [ ] Focal deficit [ ] Paresthesias [ ] Syncope  Lymphatic: [ ] Swelling [ ] Lymphadenopathy   Skin: [ ] Rash [ ] Ecchymoses [ ] Wounds [ ] Lesions  Psychiatry: [ ] Depression [ ] Suicidal/Homicidal Ideation [ ] Anxiety [ ] Sleep Disturbances  [ ] 10 point review of systems is otherwise negative except as mentioned above            [ ]Unable to obtain    Medications:  acetaminophen    Suspension. 650 milliGRAM(s) Oral every 6 hours PRN  ALBUTerol/ipratropium for Nebulization 3 milliLiter(s) Nebulizer every 6 hours PRN  aspirin  chewable 81 milliGRAM(s) Enteral Tube daily  benzocaine 15 mG/menthol 3.6 mG Lozenge 1 Lozenge Oral every 6 hours PRN  brimonidine 0.2% Ophthalmic Solution 1 Drop(s) Both EYES three times a day  dorzolamide 2%/timolol 0.5% Ophthalmic Solution 1 Drop(s) Both EYES daily  glycopyrrolate Injectable 0.4 milliGRAM(s) IV Push every 6 hours PRN  heparin  Injectable 5000 Unit(s) SubCutaneous every 8 hours  influenza   Vaccine 0.5 milliLiter(s) IntraMuscular once  latanoprost 0.005% Ophthalmic Solution 1 Drop(s) Both EYES at bedtime  levoFLOXacin IVPB 500 milliGRAM(s) IV Intermittent every 48 hours  levoFLOXacin IVPB      midodrine 10 milliGRAM(s) Oral three times a day  Nephro-asim 1 Tablet(s) Oral daily  pantoprazole   Suspension 40 milliGRAM(s) Oral two times a day before meals  simvastatin 40 milliGRAM(s) Oral at bedtime      Vitals:  T(C): 36.7 (10-18-17 @ 04:21), Max: 36.8 (10-17-17 @ 13:25)  HR: 71 (10-18-17 @ 05:38) (59 - 74)  BP: 150/72 (10-18-17 @ 05:38) (113/63 - 150/72)  BP(mean): --  RR: 18 (10-18-17 @ 04:21) (18 - 18)  SpO2: 99% (10-18-17 @ 04:21) (97% - 100%)  Wt(kg): --  Daily     Daily   I&O's Summary    16 Oct 2017 07:01  -  17 Oct 2017 07:00  --------------------------------------------------------  IN: 855 mL / OUT: 0 mL / NET: 855 mL    17 Oct 2017 07:01  -  18 Oct 2017 06:54  --------------------------------------------------------  IN: 0 mL / OUT: 0 mL / NET: 0 mL        Physical Exam:  Appearance:  Normal, NAD  HEENT:   Normal oral mucosa, PERRL, EOMI	  Lymphatic: No lymphadenopathy  Cardiovascular:  No JVD. Normal S1 S2.  I/VI basal LOU. No edema  Respiratory: distant b/l rhonchi	  Psychiatry: A & O x 3, Mood & affect appropriate  Gastrointestinal:  Soft, Non-tender, + BS	  Skin: No rashes, No ecchymoses, No cyanosis	  Neurologic: Non-focal  Extremities: Normal range of motion, No clubbing, cyanosis or edema  Vascular: Peripheral pulses palpable 2+ bilaterally  Labs:                        11.6   13.5  )-----------( 222      ( 16 Oct 2017 09:52 )             35.4     10-16    145  |  101  |  78<H>  ----------------------------<  108<H>  4.5   |  23  |  7.22<H>    Ca    10.4      16 Oct 2017 09:52  Phos  4.3     10-16  Mg     2.6     10-16    TPro  6.2  /  Alb  3.4  /  TBili  0.2  /  DBili  x   /  AST  27  /  ALT  24  /  AlkPhos  99  10-16    Interpretation of Telemetry: SR 70-80s    Echo:  ------------------------------------------------------------------------  Confirmed on  10/13/2017 - 10:20:28 by Austin Rodríguez M.D.  Conclusions:  1. Mitral annular calcification. There is systolic anterior  motion of the mitral valve. Moderate, eccentric,  posteriorly-directed mitral regurgitation.  2. Aortic valve not well visualized; appears to be a  calcified trileaflet valve with decreased opening. Measured  peak transaortic valve gradient equals 47 mm Hg. Unable to  assess degree of aortic stenosis due to the presence of  systolic anterior motion of the mitral valve and elevated  LVOT gradient. Visually, the aortic valve appears at least  moderately stenotic. Consider REDD to evaluate the aortic  valve if clinically indicated. No aortic valve  regurgitation seen.  3. Mild left ventricular hypertrophy with basal septal  bulge. Basal septal thickness=1.3 cm.  4. Hyperdynamic left ventricle. Peak left ventricular  outflow tract gradient equals 54 mm Hg, consistent with  moderately severe LVOT obstruction.  5. Mild diastolic dysfunction (Stage I).  6. Normal right ventricular size and function.  *** No previous Echo exam.    ------------------------------------------------------------------------ Interval History: Some stable chest discomfort when coughing otherwise uneventful evening.     Review Of Systems:  Constitutional: [ ] Fever [ ] Chills [ ] Fatigue [ ] Weight change   HEENT: [ ] Blurred vision [ ] Eye Pain [ ] Headache [ ] Runny nose [ ] Sore Throat   Respiratory: [ ] Cough [ ] Wheezing [ ] Shortness of breath  Cardiovascular: [ ] Chest Pain [ ] Palpitations [ ] CHICAS [ ] PND [ ] Orthopnea  Gastrointestinal: [ ] Abdominal Pain [ ] Diarrhea [ ] Constipation [ ] Hemorrhoids [ ] Nausea [ ] Vomiting  Genitourinary: [ ] Nocturia [ ] Dysuria [ ] Incontinence  Extremities: [ ] Swelling [ ] Joint Pain  Neurologic: [ ] Focal deficit [ ] Paresthesias [ ] Syncope  Lymphatic: [ ] Swelling [ ] Lymphadenopathy   Skin: [ ] Rash [ ] Ecchymoses [ ] Wounds [ ] Lesions  Psychiatry: [ ] Depression [ ] Suicidal/Homicidal Ideation [ ] Anxiety [ ] Sleep Disturbances  [ x] 10 point review of systems is otherwise negative except as mentioned above            [ ]Unable to obtain    Medications:  acetaminophen    Suspension. 650 milliGRAM(s) Oral every 6 hours PRN  ALBUTerol/ipratropium for Nebulization 3 milliLiter(s) Nebulizer every 6 hours PRN  aspirin  chewable 81 milliGRAM(s) Enteral Tube daily  benzocaine 15 mG/menthol 3.6 mG Lozenge 1 Lozenge Oral every 6 hours PRN  brimonidine 0.2% Ophthalmic Solution 1 Drop(s) Both EYES three times a day  dorzolamide 2%/timolol 0.5% Ophthalmic Solution 1 Drop(s) Both EYES daily  glycopyrrolate Injectable 0.4 milliGRAM(s) IV Push every 6 hours PRN  heparin  Injectable 5000 Unit(s) SubCutaneous every 8 hours  influenza   Vaccine 0.5 milliLiter(s) IntraMuscular once  latanoprost 0.005% Ophthalmic Solution 1 Drop(s) Both EYES at bedtime  levoFLOXacin IVPB 500 milliGRAM(s) IV Intermittent every 48 hours  levoFLOXacin IVPB      midodrine 10 milliGRAM(s) Oral three times a day  Nephro-asim 1 Tablet(s) Oral daily  pantoprazole   Suspension 40 milliGRAM(s) Oral two times a day before meals  simvastatin 40 milliGRAM(s) Oral at bedtime      Vitals:  T(C): 36.7 (10-18-17 @ 04:21), Max: 36.8 (10-17-17 @ 13:25)  HR: 71 (10-18-17 @ 05:38) (59 - 74)  BP: 150/72 (10-18-17 @ 05:38) (113/63 - 150/72)  BP(mean): --  RR: 18 (10-18-17 @ 04:21) (18 - 18)  SpO2: 99% (10-18-17 @ 04:21) (97% - 100%)  Wt(kg): --  Daily     Daily   I&O's Summary    16 Oct 2017 07:01  -  17 Oct 2017 07:00  --------------------------------------------------------  IN: 855 mL / OUT: 0 mL / NET: 855 mL    17 Oct 2017 07:01  -  18 Oct 2017 06:54  --------------------------------------------------------  IN: 0 mL / OUT: 0 mL / NET: 0 mL        Physical Exam:  Appearance:  Normal, NAD  HEENT:   Normal oral mucosa, PERRL, EOMI	  Lymphatic: No lymphadenopathy  Cardiovascular:  No JVD. Normal S1 S2.  I/VI basal LOU w/ carotid radiation. No edema  Respiratory: distant b/l rhonchi	  Psychiatry: A & O x 3, Mood & affect appropriate  Gastrointestinal:  Soft, Non-tender, + BS	  Skin: No rashes, No ecchymoses, No cyanosis	  Neurologic: Non-focal  Extremities: Normal range of motion, No clubbing, cyanosis or edema  Vascular: Peripheral pulses palpable 2+ bilaterally  Labs:                        11.6   13.5  )-----------( 222      ( 16 Oct 2017 09:52 )             35.4     10-16    145  |  101  |  78<H>  ----------------------------<  108<H>  4.5   |  23  |  7.22<H>    Ca    10.4      16 Oct 2017 09:52  Phos  4.3     10-16  Mg     2.6     10-16    TPro  6.2  /  Alb  3.4  /  TBili  0.2  /  DBili  x   /  AST  27  /  ALT  24  /  AlkPhos  99  10-16    Interpretation of Telemetry: SR 70-80s    Echo:  ------------------------------------------------------------------------  Confirmed on  10/13/2017 - 10:20:28 by Austin Rodríguez M.D.  Conclusions:  1. Mitral annular calcification. There is systolic anterior  motion of the mitral valve. Moderate, eccentric,  posteriorly-directed mitral regurgitation.  2. Aortic valve not well visualized; appears to be a  calcified trileaflet valve with decreased opening. Measured  peak transaortic valve gradient equals 47 mm Hg. Unable to  assess degree of aortic stenosis due to the presence of  systolic anterior motion of the mitral valve and elevated  LVOT gradient. Visually, the aortic valve appears at least  moderately stenotic. Consider REDD to evaluate the aortic  valve if clinically indicated. No aortic valve  regurgitation seen.  3. Mild left ventricular hypertrophy with basal septal  bulge. Basal septal thickness=1.3 cm.  4. Hyperdynamic left ventricle. Peak left ventricular  outflow tract gradient equals 54 mm Hg, consistent with  moderately severe LVOT obstruction.  5. Mild diastolic dysfunction (Stage I).  6. Normal right ventricular size and function.  *** No previous Echo exam.    ------------------------------------------------------------------------

## 2017-10-18 NOTE — PROGRESS NOTE ADULT - SUBJECTIVE AND OBJECTIVE BOX
Patient is a 83y old  Female who presents with a chief complaint of chest pain, sore throat (12 Oct 2017 03:25)      SUBJECTIVE / OVERNIGHT EVENTS: no acute events overnight, patient's cough is improving. Continues to spit up secretions.     MEDICATIONS  (STANDING):  aspirin  chewable 81 milliGRAM(s) Enteral Tube daily  brimonidine 0.2% Ophthalmic Solution 1 Drop(s) Both EYES three times a day  dorzolamide 2%/timolol 0.5% Ophthalmic Solution 1 Drop(s) Both EYES daily  epoetin ivon Injectable 4000 Unit(s) IV Push <User Schedule>  heparin  Injectable 5000 Unit(s) SubCutaneous every 8 hours  influenza   Vaccine 0.5 milliLiter(s) IntraMuscular once  latanoprost 0.005% Ophthalmic Solution 1 Drop(s) Both EYES at bedtime  levoFLOXacin IVPB 500 milliGRAM(s) IV Intermittent every 48 hours  levoFLOXacin IVPB      midodrine 10 milliGRAM(s) Oral three times a day  Nephro-asim 1 Tablet(s) Oral daily  pantoprazole   Suspension 40 milliGRAM(s) Oral two times a day before meals  simvastatin 40 milliGRAM(s) Oral at bedtime    MEDICATIONS  (PRN):  acetaminophen    Suspension. 650 milliGRAM(s) Oral every 6 hours PRN pain  ALBUTerol/ipratropium for Nebulization 3 milliLiter(s) Nebulizer every 6 hours PRN Shortness of Breath and/or Wheezing  benzocaine 15 mG/menthol 3.6 mG Lozenge 1 Lozenge Oral every 6 hours PRN Sore Throat  glycopyrrolate Injectable 0.4 milliGRAM(s) IV Push every 6 hours PRN secretions      Vital Signs Last 24 Hrs  T(C): 36.6 (18 Oct 2017 13:00), Max: 36.7 (18 Oct 2017 04:21)  T(F): 97.9 (18 Oct 2017 13:00), Max: 98.1 (18 Oct 2017 04:21)  HR: 74 (18 Oct 2017 13:00) (71 - 74)  BP: 106/60 (18 Oct 2017 13:00) (106/60 - 150/72)  BP(mean): --  RR: 18 (18 Oct 2017 13:00) (18 - 18)  SpO2: 100% (18 Oct 2017 13:00) (97% - 100%)  CAPILLARY BLOOD GLUCOSE        I&O's Summary    17 Oct 2017 07:01  -  18 Oct 2017 07:00  --------------------------------------------------------  IN: 360 mL / OUT: 0 mL / NET: 360 mL    18 Oct 2017 07:01  -  18 Oct 2017 15:57  --------------------------------------------------------  IN: 0 mL / OUT: 0 mL / NET: 0 mL        PHYSICAL EXAM:  GENERAL: NAD, resting comfortably   HEAD:  Atraumatic, Normocephalic  EYES: EOMI, PERRLA, conjunctiva and sclera clear  NECK: Supple, No JVD  CHEST/LUNG: +coarse ronchi, no wheezing, good air movement b/l   HEART: Regular rate and rhythm; +S1/S2  ABDOMEN: Soft, Nontender, Nondistended; Bowel sounds present. PEG in place.   EXTREMITIES:  2+ Peripheral Pulses, No clubbing, cyanosis, or edema  PSYCH: AAOx3  NEUROLOGY: non-focal    LABS:                        9.5    9.7   )-----------( 165      ( 18 Oct 2017 09:54 )             29.3     10-18    141  |  100  |  89<H>  ----------------------------<  123<H>  3.6   |  22  |  5.38<H>    Ca    9.6      18 Oct 2017 09:55

## 2017-10-18 NOTE — PROGRESS NOTE ADULT - ASSESSMENT
83F w/ hx of ESRD (on HD Tues/Thurs/Sat), HTN, CVA (~20 years ago; s/p PEG w/ left hemiparesis/dysphagia at baseline), LVOT obstruction, HLD and GERD who presented to [place] w/ 1 week hx of sore throat/chest pain and transferred to [facility] (10/12) after pt was found to have elevated troponins in the setting of missed HD w/ course c/b hypotension, bradycardia and syncopal event. Transferred to MICU on 10/13 after RRT was called for syncopal event, required phenylephrine while in MICU for hypotension. BP improved and now transferred to the floor:

## 2017-10-18 NOTE — PROGRESS NOTE ADULT - ASSESSMENT
Impression:  84yo F with history of CVA c/b dysphagia s/p PEG for many years, diastolic CHF, LVOT obstruction, GERD, ESRD (T/Th/Sat via left IJ tunneled dialysis cath), left arm vascular stent, hypotension requiring midodrine, and stroke complicated by dysphagia requiring PEG tube who presented to [facility] for r/o NSTEMI and upper respiratory illness.  GI consulted for concern for esophageal reflux on CT scan as well as copious oral secretions.    Problem List:  1) Reflux of stomach contents visualized on CT, history of GERD on once daily high dose PPI  2) Oropharyngeal dysphagia with copious oral secretions, s/p PEG   3) Cough - concern for aspiration PNA vs viral illness  4) ESRD on HD with associated hypotension  5)  Chronic diastolic heart failure    Plan:  - continue PPI BID for heartburn and reflux  - continue anti-cholinergic for management of oral secretions, suctioning  - per chart note, patient declined Reglan and Baclofen to improve LES tightening in favor of maintaining current regimen  - patient does not want to pursue surgical consult for Nissen or endoscopic evaluation (given she is concerned about being intubated)  - consider pall care consult given high aspiration risk - renu from oropharyngeal dysphagia and saliva - with limited corrective options

## 2017-10-18 NOTE — PROGRESS NOTE ADULT - SUBJECTIVE AND OBJECTIVE BOX
Patient  seen and examined  no complaints    MSG causes dizziness (Other)  pcn (Hives)  penicillin (Hives)    Hospital Medications:   MEDICATIONS  (STANDING):  aspirin  chewable 81 milliGRAM(s) Enteral Tube daily  brimonidine 0.2% Ophthalmic Solution 1 Drop(s) Both EYES three times a day  dorzolamide 2%/timolol 0.5% Ophthalmic Solution 1 Drop(s) Both EYES daily  heparin  Injectable 5000 Unit(s) SubCutaneous every 8 hours  influenza   Vaccine 0.5 milliLiter(s) IntraMuscular once  latanoprost 0.005% Ophthalmic Solution 1 Drop(s) Both EYES at bedtime  levoFLOXacin IVPB 500 milliGRAM(s) IV Intermittent every 48 hours  levoFLOXacin IVPB      midodrine 10 milliGRAM(s) Oral three times a day  Nephro-asim 1 Tablet(s) Oral daily  pantoprazole   Suspension 40 milliGRAM(s) Oral two times a day before meals  simvastatin 40 milliGRAM(s) Oral at bedtime    VITALS:  T(F): 98.1 (10-18-17 @ 04:21), Max: 98.2 (10-17-17 @ 13:25)  HR: 71 (10-18-17 @ 05:38)  BP: 150/72 (10-18-17 @ 05:38)  RR: 18 (10-18-17 @ 04:21)  SpO2: 99% (10-18-17 @ 04:21)  Wt(kg): --    10-17 @ 07:01  -  10-18 @ 07:00  --------------------------------------------------------  IN: 360 mL / OUT: 0 mL / NET: 360 mL    10-18 @ 07:01  -  10-18 @ 11:24  --------------------------------------------------------  IN: 0 mL / OUT: 0 mL / NET: 0 mL        PHYSICAL EXAM:  Constitutional: NAD  HEENT: anicteric sclera, oropharynx clear, MMM  Neck: No JVD  Respiratory: CTAB, no wheezes, rales or rhonchi  Cardiovascular: S1, S2, RRR  Gastrointestinal: BS+, soft, NT/ND  Extremities: No cyanosis or clubbing. No peripheral edema  Vascular Access: left PC    LABS:  10-18    141  |  100  |  89<H>  ----------------------------<  123<H>  3.6   |  22  |  5.38<H>    Ca    9.6      18 Oct 2017 09:55      Creatinine Trend: 5.38 <--, 7.22 <--, 5.42 <--, 4.78 <--, 1.34 <--, 4.01 <--, 6.81 <--, 7.29 <--, 7.48 <--, 7.79 <--, 9.04 <--, 11.52 <--, 11.13 <--, 9.74 <--, 10.80 <--                        9.5    9.7   )-----------( 165      ( 18 Oct 2017 09:54 )             29.3     Urine Studies:      RADIOLOGY & ADDITIONAL STUDIES:

## 2017-10-18 NOTE — PROGRESS NOTE ADULT - SUBJECTIVE AND OBJECTIVE BOX
Chief Complaint:  Patient is a 83y old  Female who presents with a chief complaint of chest pain, sore throat (12 Oct 2017 03:25)      Interval Events:   No events overnight.  Patient awake, reports feeling "so-so."  States that she thinks her secretions have decreased (though she has a cup full of secretions in her hand).  She reports not being able to swallow her saliva at all.  Endorses some heartburn occasionally.  Does not want endoscopy.     Allergies:  MSG causes dizziness (Other)  pcn (Hives)  penicillin (Hives)      Hospital Medications:  acetaminophen    Suspension. 650 milliGRAM(s) Oral every 6 hours PRN  ALBUTerol/ipratropium for Nebulization 3 milliLiter(s) Nebulizer every 6 hours PRN  aspirin  chewable 81 milliGRAM(s) Enteral Tube daily  benzocaine 15 mG/menthol 3.6 mG Lozenge 1 Lozenge Oral every 6 hours PRN  brimonidine 0.2% Ophthalmic Solution 1 Drop(s) Both EYES three times a day  dorzolamide 2%/timolol 0.5% Ophthalmic Solution 1 Drop(s) Both EYES daily  glycopyrrolate Injectable 0.4 milliGRAM(s) IV Push every 6 hours PRN  heparin  Injectable 5000 Unit(s) SubCutaneous every 8 hours  influenza   Vaccine 0.5 milliLiter(s) IntraMuscular once  latanoprost 0.005% Ophthalmic Solution 1 Drop(s) Both EYES at bedtime  levoFLOXacin IVPB 500 milliGRAM(s) IV Intermittent every 48 hours  levoFLOXacin IVPB      midodrine 10 milliGRAM(s) Oral three times a day  Nephro-asim 1 Tablet(s) Oral daily  pantoprazole   Suspension 40 milliGRAM(s) Oral two times a day before meals  simvastatin 40 milliGRAM(s) Oral at bedtime      PMHX/PSHX:  Hypotension  Murmur  Osteoporosis  Chronic renal failure  History of CVA (cerebrovascular accident)  Status post insertion of dialysis catheter  S/P gastrostomy  Arteriovenous graft for hemodialysis in place, primary  S/P cataract extraction      Family history:  Family history of breast cancer (Grandparent)  Family history of heart disease  History of hypertension      ROS:     General:  No wt loss, fevers, chills, night sweats, fatigue,   Eyes:  Good vision, no reported pain  ENT:  No sore throat, pain, runny nose, dysphagia  CV:  intermittent CP  Resp:  No dyspnea, cough, tachypnea, wheezing  GI:  See HPI  :  No pain, bleeding, incontinence, nocturia  Muscle:  No pain, weakness  Neuro:  No weakness, tingling, memory problems  Psych:  No fatigue, insomnia, mood problems, depression  Endocrine:  No polyuria, polydipsia, cold/heat intolerance  Heme:  No petechiae, ecchymosis, easy bruisability  Skin:  No rash, edema      PHYSICAL EXAM:     GENERAL:  Appears stated age, well-groomed, well-nourished, no distress  HEENT:  NC/AT,  conjunctivae clear, sclera -anicteric  CHEST:  Full & symmetric excursion, no increased effort,   ABDOMEN:  Soft, non-tender, non-distended, normoactive bowel sounds, PEG site c/d/i  EXTREMITIES:  no cyanosis,clubbing or edema  SKIN:  No rash  NEURO:  Alert, oriented    Vital Signs:  Vital Signs Last 24 Hrs  T(C): 36.7 (18 Oct 2017 04:21), Max: 36.8 (17 Oct 2017 13:25)  T(F): 98.1 (18 Oct 2017 04:21), Max: 98.2 (17 Oct 2017 13:25)  HR: 71 (18 Oct 2017 05:38) (59 - 74)  BP: 150/72 (18 Oct 2017 05:38) (113/63 - 150/72)  BP(mean): --  RR: 18 (18 Oct 2017 04:21) (18 - 18)  SpO2: 99% (18 Oct 2017 04:21) (97% - 100%)  Daily     Daily     LABS:                        9.5    9.7   )-----------( 165      ( 18 Oct 2017 09:54 )             29.3     10-18    141  |  100  |  89<H>  ----------------------------<  123<H>  3.6   |  22  |  5.38<H>    Ca    9.6      18 Oct 2017 09:55                Imaging:  no new imaging

## 2017-10-18 NOTE — PROGRESS NOTE ADULT - ASSESSMENT
82yo woman w/PMHx ESRD, LVOT obstruction/LVH p/w symptoms of URI, atypical chest pain, elevated cardiac biomarkers likely due to fluid retention from missed HD treatment in the setting of ESRD. Course complicated by hypotension, bradycardia and syncope requiring transition to MICU s/p transfer to tele floor (Echo has demonstrated at least moderate A.S. as well as mod to severe LV outflow tract obstruction; LV function preserved (hyperdynamic))    #LVOT obstruction with TRICE - Overnight BP's have been stable  - Recommend starting low dose BB--coreg 3.125mg BID and will continue to monitor telemetry     #Syncope - multifactorial including vagal mediated and LVOT obstruction  - continue monitoring clinically with fall precautions    #Aortic stenosis - on exam she has a mid to late peaking murmur that is difficult to characterize because of the LVOT obstruction. It probably is not severe or critical at this time; No role for further cardiac testing at this time

## 2017-10-18 NOTE — PROGRESS NOTE ADULT - PROBLEM SELECTOR PLAN 5
-on HD, plan per renal   -appreciate renal recs  -?revision of HD fistula? per  - left message with Dr. Gold's office with call back information, regarding this.

## 2017-10-18 NOTE — PROGRESS NOTE ADULT - PROBLEM SELECTOR PLAN 3
epigastric pain, unable to manage secretions (above baseline)/worsening dysphagia, GERD-reflux of contrast into esophagus  - GI following-recs noted

## 2017-10-18 NOTE — PROGRESS NOTE ADULT - SUBJECTIVE AND OBJECTIVE BOX
Follow-up Pulm Progress Note    No new respiratory events overnight.  Denies SOB/CP. still with large amounts of secretions-but feeling better today    Medications:  MEDICATIONS  (STANDING):  aspirin  chewable 81 milliGRAM(s) Enteral Tube daily  brimonidine 0.2% Ophthalmic Solution 1 Drop(s) Both EYES three times a day  dorzolamide 2%/timolol 0.5% Ophthalmic Solution 1 Drop(s) Both EYES daily  epoetin ivon Injectable 4000 Unit(s) IV Push <User Schedule>  heparin  Injectable 5000 Unit(s) SubCutaneous every 8 hours  influenza   Vaccine 0.5 milliLiter(s) IntraMuscular once  latanoprost 0.005% Ophthalmic Solution 1 Drop(s) Both EYES at bedtime  levoFLOXacin IVPB 500 milliGRAM(s) IV Intermittent every 48 hours  levoFLOXacin IVPB      midodrine 10 milliGRAM(s) Oral three times a day  Nephro-asim 1 Tablet(s) Oral daily  pantoprazole   Suspension 40 milliGRAM(s) Oral two times a day before meals  simvastatin 40 milliGRAM(s) Oral at bedtime    MEDICATIONS  (PRN):  acetaminophen    Suspension. 650 milliGRAM(s) Oral every 6 hours PRN pain  ALBUTerol/ipratropium for Nebulization 3 milliLiter(s) Nebulizer every 6 hours PRN Shortness of Breath and/or Wheezing  benzocaine 15 mG/menthol 3.6 mG Lozenge 1 Lozenge Oral every 6 hours PRN Sore Throat  glycopyrrolate Injectable 0.4 milliGRAM(s) IV Push every 6 hours PRN secretions          Vital Signs Last 24 Hrs  T(C): 36.6 (18 Oct 2017 13:00), Max: 36.7 (18 Oct 2017 04:21)  T(F): 97.9 (18 Oct 2017 13:00), Max: 98.1 (18 Oct 2017 04:21)  HR: 74 (18 Oct 2017 13:00) (71 - 74)  BP: 106/60 (18 Oct 2017 13:00) (106/60 - 150/72)  BP(mean): --  RR: 18 (18 Oct 2017 13:00) (18 - 18)  SpO2: 100% (18 Oct 2017 13:00) (97% - 100%)          10-17 @ 07:01  -  10-18 @ 07:00  --------------------------------------------------------  IN: 360 mL / OUT: 0 mL / NET: 360 mL          LABS:                        9.5    9.7   )-----------( 165      ( 18 Oct 2017 09:54 )             29.3     10-18    141  |  100  |  89<H>  ----------------------------<  123<H>  3.6   |  22  |  5.38<H>    Ca    9.6      18 Oct 2017 09:55            CAPILLARY BLOOD GLUCOSE            Procalcitonin, Serum: 0.95 ng/mL (10-16-17 @ 11:35)                  CULTURES: (if applicable)  Culture Results:   No growth at 5 days. (10-12 @ 07:42)  Culture Results:   No growth at 5 days. (10-12 @ 07:42)          Physical Examination:  PULM: decreased bs bilaterally, no significant sputum production  CVS: S1, S2 heard    RADIOLOGY REVIEWE   < from: CT Chest No Cont (10.13.17 @ 21:16) >  IMPRESSION:   Small bilateral pleural effusions.  Reflux of contrast from stomach into the esophagus, making the patient at   risk for aspiration.   An indeterminate 1.8 cm peripheral left lower lobe nodular opacity.   Recommend nonemergent follow-up chest CT for further evaluation.  Bilateral atrophic kidneys.  Central venous catheter extends into the inferior vena cave at the   confluence of hepatic veins.    < end of copied text >

## 2017-10-19 DIAGNOSIS — Z86.73 PERSONAL HISTORY OF TRANSIENT ISCHEMIC ATTACK (TIA), AND CEREBRAL INFARCTION WITHOUT RESIDUAL DEFICITS: ICD-10-CM

## 2017-10-19 DIAGNOSIS — K11.7 DISTURBANCES OF SALIVARY SECRETION: ICD-10-CM

## 2017-10-19 DIAGNOSIS — D50.0 IRON DEFICIENCY ANEMIA SECONDARY TO BLOOD LOSS (CHRONIC): ICD-10-CM

## 2017-10-19 DIAGNOSIS — J69.0 PNEUMONITIS DUE TO INHALATION OF FOOD AND VOMIT: ICD-10-CM

## 2017-10-19 DIAGNOSIS — R53.81 OTHER MALAISE: ICD-10-CM

## 2017-10-19 LAB
ANION GAP SERPL CALC-SCNC: 15 MMOL/L — SIGNIFICANT CHANGE UP (ref 5–17)
ANION GAP SERPL CALC-SCNC: 15 MMOL/L — SIGNIFICANT CHANGE UP (ref 5–17)
APTT BLD: 38.8 SEC — HIGH (ref 27.5–37.4)
BUN SERPL-MCNC: 32 MG/DL — HIGH (ref 7–23)
BUN SERPL-MCNC: 45 MG/DL — HIGH (ref 7–23)
CALCIUM SERPL-MCNC: 8.7 MG/DL — SIGNIFICANT CHANGE UP (ref 8.4–10.5)
CALCIUM SERPL-MCNC: 9.2 MG/DL — SIGNIFICANT CHANGE UP (ref 8.4–10.5)
CHLORIDE SERPL-SCNC: 100 MMOL/L — SIGNIFICANT CHANGE UP (ref 96–108)
CHLORIDE SERPL-SCNC: 100 MMOL/L — SIGNIFICANT CHANGE UP (ref 96–108)
CO2 SERPL-SCNC: 24 MMOL/L — SIGNIFICANT CHANGE UP (ref 22–31)
CO2 SERPL-SCNC: 27 MMOL/L — SIGNIFICANT CHANGE UP (ref 22–31)
CREAT SERPL-MCNC: 2.73 MG/DL — HIGH (ref 0.5–1.3)
CREAT SERPL-MCNC: 3.36 MG/DL — HIGH (ref 0.5–1.3)
GLUCOSE SERPL-MCNC: 110 MG/DL — HIGH (ref 70–99)
GLUCOSE SERPL-MCNC: 92 MG/DL — SIGNIFICANT CHANGE UP (ref 70–99)
HCT VFR BLD CALC: 25 % — LOW (ref 34.5–45)
HCT VFR BLD CALC: 25.5 % — LOW (ref 34.5–45)
HGB BLD-MCNC: 8.2 G/DL — LOW (ref 11.5–15.5)
HGB BLD-MCNC: 8.4 G/DL — LOW (ref 11.5–15.5)
INR BLD: 0.89 RATIO — SIGNIFICANT CHANGE UP (ref 0.88–1.16)
MCHC RBC-ENTMCNC: 30.4 PG — SIGNIFICANT CHANGE UP (ref 27–34)
MCHC RBC-ENTMCNC: 32.2 GM/DL — SIGNIFICANT CHANGE UP (ref 32–36)
MCHC RBC-ENTMCNC: 33 PG — SIGNIFICANT CHANGE UP (ref 27–34)
MCHC RBC-ENTMCNC: 33.5 GM/DL — SIGNIFICANT CHANGE UP (ref 32–36)
MCV RBC AUTO: 94.4 FL — SIGNIFICANT CHANGE UP (ref 80–100)
MCV RBC AUTO: 98.7 FL — SIGNIFICANT CHANGE UP (ref 80–100)
OB PNL STL: POSITIVE
PLATELET # BLD AUTO: 157 K/UL — SIGNIFICANT CHANGE UP (ref 150–400)
PLATELET # BLD AUTO: 165 K/UL — SIGNIFICANT CHANGE UP (ref 150–400)
POTASSIUM SERPL-MCNC: 3.9 MMOL/L — SIGNIFICANT CHANGE UP (ref 3.5–5.3)
POTASSIUM SERPL-MCNC: 4.4 MMOL/L — SIGNIFICANT CHANGE UP (ref 3.5–5.3)
POTASSIUM SERPL-SCNC: 3.9 MMOL/L — SIGNIFICANT CHANGE UP (ref 3.5–5.3)
POTASSIUM SERPL-SCNC: 4.4 MMOL/L — SIGNIFICANT CHANGE UP (ref 3.5–5.3)
PROTHROM AB SERPL-ACNC: 9.7 SEC — LOW (ref 9.8–12.7)
RBC # BLD: 2.53 M/UL — LOW (ref 3.8–5.2)
RBC # BLD: 2.7 M/UL — LOW (ref 3.8–5.2)
RBC # FLD: 16.8 % — HIGH (ref 10.3–14.5)
RBC # FLD: 17.7 % — HIGH (ref 10.3–14.5)
RH IG SCN BLD-IMP: POSITIVE — SIGNIFICANT CHANGE UP
SODIUM SERPL-SCNC: 139 MMOL/L — SIGNIFICANT CHANGE UP (ref 135–145)
SODIUM SERPL-SCNC: 142 MMOL/L — SIGNIFICANT CHANGE UP (ref 135–145)
WBC # BLD: 10.6 K/UL — HIGH (ref 3.8–10.5)
WBC # BLD: 9.23 K/UL — SIGNIFICANT CHANGE UP (ref 3.8–10.5)
WBC # FLD AUTO: 10.6 K/UL — HIGH (ref 3.8–10.5)
WBC # FLD AUTO: 9.23 K/UL — SIGNIFICANT CHANGE UP (ref 3.8–10.5)

## 2017-10-19 PROCEDURE — 99233 SBSQ HOSP IP/OBS HIGH 50: CPT

## 2017-10-19 PROCEDURE — 99232 SBSQ HOSP IP/OBS MODERATE 35: CPT | Mod: GC

## 2017-10-19 PROCEDURE — 99223 1ST HOSP IP/OBS HIGH 75: CPT

## 2017-10-19 RX ORDER — PANTOPRAZOLE SODIUM 20 MG/1
40 TABLET, DELAYED RELEASE ORAL
Qty: 0 | Refills: 0 | Status: DISCONTINUED | OUTPATIENT
Start: 2017-10-19 | End: 2017-10-22

## 2017-10-19 RX ORDER — SODIUM CHLORIDE 9 MG/ML
250 INJECTION INTRAMUSCULAR; INTRAVENOUS; SUBCUTANEOUS ONCE
Qty: 0 | Refills: 0 | Status: DISCONTINUED | OUTPATIENT
Start: 2017-10-19 | End: 2017-10-22

## 2017-10-19 RX ADMIN — DORZOLAMIDE HYDROCHLORIDE TIMOLOL MALEATE 1 DROP(S): 20; 5 SOLUTION/ DROPS OPHTHALMIC at 12:13

## 2017-10-19 RX ADMIN — BRIMONIDINE TARTRATE 1 DROP(S): 2 SOLUTION/ DROPS OPHTHALMIC at 05:21

## 2017-10-19 RX ADMIN — Medication 81 MILLIGRAM(S): at 12:12

## 2017-10-19 RX ADMIN — PANTOPRAZOLE SODIUM 40 MILLIGRAM(S): 20 TABLET, DELAYED RELEASE ORAL at 05:22

## 2017-10-19 RX ADMIN — MIDODRINE HYDROCHLORIDE 10 MILLIGRAM(S): 2.5 TABLET ORAL at 21:40

## 2017-10-19 RX ADMIN — Medication 650 MILLIGRAM(S): at 00:26

## 2017-10-19 RX ADMIN — HEPARIN SODIUM 5000 UNIT(S): 5000 INJECTION INTRAVENOUS; SUBCUTANEOUS at 18:06

## 2017-10-19 RX ADMIN — BRIMONIDINE TARTRATE 1 DROP(S): 2 SOLUTION/ DROPS OPHTHALMIC at 21:39

## 2017-10-19 RX ADMIN — MIDODRINE HYDROCHLORIDE 10 MILLIGRAM(S): 2.5 TABLET ORAL at 02:00

## 2017-10-19 RX ADMIN — SIMVASTATIN 40 MILLIGRAM(S): 20 TABLET, FILM COATED ORAL at 21:40

## 2017-10-19 RX ADMIN — Medication 650 MILLIGRAM(S): at 23:00

## 2017-10-19 RX ADMIN — Medication 1 TABLET(S): at 12:13

## 2017-10-19 RX ADMIN — PANTOPRAZOLE SODIUM 40 MILLIGRAM(S): 20 TABLET, DELAYED RELEASE ORAL at 18:08

## 2017-10-19 RX ADMIN — LATANOPROST 1 DROP(S): 0.05 SOLUTION/ DROPS OPHTHALMIC; TOPICAL at 21:40

## 2017-10-19 RX ADMIN — MIDODRINE HYDROCHLORIDE 10 MILLIGRAM(S): 2.5 TABLET ORAL at 13:51

## 2017-10-19 RX ADMIN — Medication 650 MILLIGRAM(S): at 21:41

## 2017-10-19 RX ADMIN — BRIMONIDINE TARTRATE 1 DROP(S): 2 SOLUTION/ DROPS OPHTHALMIC at 13:52

## 2017-10-19 RX ADMIN — HEPARIN SODIUM 5000 UNIT(S): 5000 INJECTION INTRAVENOUS; SUBCUTANEOUS at 10:08

## 2017-10-19 NOTE — PROVIDER CONTACT NOTE (OTHER) - BACKGROUND
83 years old female admitted for acute URI, hypotension. PMH: ESRD-HD s/p peg due to h/o CVA with left hemiparesis and dysphagia at baseline.

## 2017-10-19 NOTE — PROGRESS NOTE ADULT - ASSESSMENT
83F c hx HLD, CVA c/b dysphagia s/p PEG, grade 1 DHF, LVOT obstruction, GERD, ESRD (T/Th/Sat via left IJ tunneled dialysis cath), left arm vascular stent, hypotension requiring midodrine, presented to [place] for worsening cough for 10 days, transferred to [facility] for NSTEMI mgmt. c/c/b hypotension/bradycardia/ excessive secretions/aspiration

## 2017-10-19 NOTE — PROGRESS NOTE ADULT - SUBJECTIVE AND OBJECTIVE BOX
Patient is a 83y old  Female who presents with a chief complaint of chest pain, sore throat (12 Oct 2017 03:25)      SUBJECTIVE / OVERNIGHT EVENTS: patient denies any pain or discomfort. Cough and secretions are at baseline. Denies abdominal pain or pressure.     MEDICATIONS  (STANDING):  aspirin  chewable 81 milliGRAM(s) Enteral Tube daily  brimonidine 0.2% Ophthalmic Solution 1 Drop(s) Both EYES three times a day  dorzolamide 2%/timolol 0.5% Ophthalmic Solution 1 Drop(s) Both EYES daily  epoetin ivon Injectable 4000 Unit(s) IV Push <User Schedule>  heparin  Injectable 5000 Unit(s) SubCutaneous every 8 hours  influenza   Vaccine 0.5 milliLiter(s) IntraMuscular once  latanoprost 0.005% Ophthalmic Solution 1 Drop(s) Both EYES at bedtime  levoFLOXacin IVPB 500 milliGRAM(s) IV Intermittent every 48 hours  levoFLOXacin IVPB      midodrine 10 milliGRAM(s) Oral three times a day  Nephro-asim 1 Tablet(s) Oral daily  pantoprazole  Injectable 40 milliGRAM(s) IV Push two times a day  simvastatin 40 milliGRAM(s) Oral at bedtime  sodium chloride 0.9% Bolus 250 milliLiter(s) IV Bolus once    MEDICATIONS  (PRN):  acetaminophen    Suspension. 650 milliGRAM(s) Oral every 6 hours PRN pain  ALBUTerol/ipratropium for Nebulization 3 milliLiter(s) Nebulizer every 6 hours PRN Shortness of Breath and/or Wheezing  benzocaine 15 mG/menthol 3.6 mG Lozenge 1 Lozenge Oral every 6 hours PRN Sore Throat  glycopyrrolate Injectable 0.4 milliGRAM(s) IV Push every 6 hours PRN secretions      Vital Signs Last 24 Hrs  T(C): 36.8 (19 Oct 2017 05:00), Max: 36.9 (19 Oct 2017 02:00)  T(F): 98.3 (19 Oct 2017 05:00), Max: 98.4 (19 Oct 2017 02:00)  HR: 73 (19 Oct 2017 05:00) (72 - 85)  BP: 109/61 (19 Oct 2017 05:00) (84/50 - 129/70)  BP(mean): --  RR: 18 (19 Oct 2017 05:00) (18 - 18)  SpO2: 97% (19 Oct 2017 05:00) (96% - 100%)  CAPILLARY BLOOD GLUCOSE        I&O's Summary    18 Oct 2017 07:01  -  19 Oct 2017 07:00  --------------------------------------------------------  IN: 640 mL / OUT: 200 mL / NET: 440 mL    19 Oct 2017 07:01  -  19 Oct 2017 12:12  --------------------------------------------------------  IN: 20 mL / OUT: 0 mL / NET: 20 mL        PHYSICAL EXAM:  GENERAL: NAD, sitting up in chair, resting comfortably   HEAD:  Atraumatic, Normocephalic  EYES: EOMI, PERRLA, conjunctiva and sclera clear  NECK: Supple, No JVD  CHEST/LUNG: clear b/l, good air movement, no wheezing   HEART: Regular rate and rhythm; +S1/S2  ABDOMEN: Soft, Nontender, Nondistended; Bowel sounds present. PEG in place. No bloody drainage from peg.   EXTREMITIES:  2+ Peripheral Pulses, No clubbing, cyanosis, or edema  PSYCH: AAOx3  NEUROLOGY: non-focal    LABS:                        8.2    9.23  )-----------( 165      ( 19 Oct 2017 08:35 )             25.5     10-19    142  |  100  |  32<H>  ----------------------------<  92  3.9   |  27  |  2.73<H>    Ca    8.7      19 Oct 2017 08:24      PT/INR - ( 19 Oct 2017 02:37 )   PT: 9.7 sec;   INR: 0.89 ratio         PTT - ( 19 Oct 2017 02:37 )  PTT:38.8 sec        Consultant(s) Notes Reviewed:      Care Discussed with Consultants/Other Providers: Palliative Care Dr. Salcido, Renal Dr. Gold

## 2017-10-19 NOTE — CONSULT NOTE ADULT - ASSESSMENT
HPI:  83F c hx HLD, CVA c/b dysphagia s/p PEG, grade 1 DHF, LVOT obstruction, GERD, ESRD (T/Th/Sat via left IJ tunneled dialysis cath), left arm vascular stent, hypotension requiring midodrine, presented to [place] for worsening cough for 10 days, transferred to [facility] for NSTEMI mgmt  with large secretion burden consulted for assistance with medical decision making

## 2017-10-19 NOTE — PROGRESS NOTE ADULT - PROBLEM SELECTOR PLAN 3
-changing PPI to IV BID  -patient and family have declined further procedures/surgical interventions

## 2017-10-19 NOTE — PROGRESS NOTE ADULT - ASSESSMENT
83F w/ hx of ESRD (on HD Tues/Thurs/Sat), HTN, CVA (~20 years ago; s/p PEG w/ left hemiparesis/dysphagia at baseline), LVOT obstruction, HLD and GERD who presented to [place] w/ 1 week hx of sore throat/chest pain and transferred to [facility] (10/12) after pt was found to have elevated troponins in the setting of missed HD w/ course c/b hypotension, bradycardia and syncopal event. Transferred to MICU on 10/13 after RRT was called for syncopal event, required phenylephrine while in MICU for hypotension. BP improved and transferred to the floor. Being managed medically for dysphagia/increased secretions. On ceftriaxone for aspiration PNA. Had drop in H&H, suspected to be GI bleed:

## 2017-10-19 NOTE — CHART NOTE - NSCHARTNOTEFT_GEN_A_CORE
20:20 10/18     Endorsed by day NP to follow up repeat CBC, resulted 8.1. PCA states patient with dark tarry stool this evening. Patient seen  and examined during dialysis. Resting comfortably In bed, no acute complaints. Unable to answer if stool was dark/tarry. Denies chest pain, palpitations, dizziness, lightheadedness, n/v, diaphoresis. Son at bedside and denies mother having dark tarry stools recently but was not at the bedside earlier in the evening.     ICU Vital Signs Last 24 Hrs  T(C): 36.8 (19 Oct 2017 05:00), Max: 36.9 (19 Oct 2017 02:00)  T(F): 98.3 (19 Oct 2017 05:00), Max: 98.4 (19 Oct 2017 02:00)  HR: 73 (19 Oct 2017 05:00) (72 - 85)  BP: 109/61 (19 Oct 2017 05:00) (84/50 - 129/70)  RR: 18 (19 Oct 2017 05:00) (18 - 18)  SpO2: 97% (19 Oct 2017 05:00) (96% - 100%)                          8.4    10.6  )-----------( 157      ( 19 Oct 2017 02:37 )             25.0     10-18    141  |  100  |  89<H>  ----------------------------<  123<H>  3.6   |  22  |  5.38<H>    Ca    9.6      18 Oct 2017 09:55    PT/INR - ( 19 Oct 2017 02:37 )   PT: 9.7 sec;   INR: 0.89 ratio         PTT - ( 19 Oct 2017 02:37 )  PTT:38.8 sec    FOBT positive       PHYSICAL EXAM:  General: A&0x 2-3, mentating appropriately  Lung: CTA B/L, no wheezing noted  Cardiac: S1/S2, RRR, no murmur noted  Abd: Soft, NT/ND, + BS, Peg noted in place  Ext: No lower ext edema noted, peripheral pulses 2+ throughtout     HPI:  83F c hx HLD, CVA c/b dysphagia s/p PEG, grade 1 DHF, LVOT obstruction, GERD, ESRD (T/Th/Sat via left IJ tunneled dialysis cath), left arm vascular stent, hypotension requiring midodrine, presented to [place] for worsening cough for 10 days, transferred to [facility] for NSTEMI mgmt.    History obtained mostly from daughter, Palma Macias, as pt was having trouble speaking 2/2 coughing and mucous.   At baseline, pt unable to swallow secretions so she drools/spits out saliva. 10 days ago, reports having increasing productive cough with change in sputum color. This was managed as outpt at home. Pt reports missing her Tuesday HD session, last HD session was 5 days ago on saturday. She presented to [place] hospital with persistent cough and sputum. At [place], found to have positive trops, transferred to [facility] for further management. Pt complaining of some chest pain on coughing only, and some abdominal pain. Denies fevers, chills, nausea, diarrhea. Reports one episode of vomiting.     1. acute anemia, FOBT positive r/o GI bleed   - transfuse PRN for Hgb < 7  - repeat CBC in AM   - Protonix BID changed to IV  - GI currently following patient, will reconsult in AM with new findings for possible endoscopy   - monitor vital signs   - type and screen  - Follow up with primary day team in AM    Gardenia Lehman PA-C

## 2017-10-19 NOTE — PROVIDER CONTACT NOTE (OTHER) - ASSESSMENT
Patient alert and oriented x 4.  Denies any c/o dizziness or light headedness.  Son by the bedside.  Last Hgb 8.1 with positive occult result. Heparin dosage held x 1 per PA's instructions (Compression device for DVT ppx).

## 2017-10-19 NOTE — CONSULT NOTE ADULT - PROBLEM SELECTOR RECOMMENDATION 9
84 y/o female with productive cough x 10 days. Pt examined and scoped for possible epiglottitis. No evidence of epiglottitis or any other abnormality on flexible scope. Airway widely patent. Normal scope    -Recommend following up GI consult to rule out esophageal etiology  -No ENT intervention required at this time  -Call with any questions
Pt reports 26 yr history of HX  Associated hypotension episodes with 3 hour sessions (once without incident) now with hypotension  No reported plans of discontinuation noted by the patient  Will confer with Nephrology, unclear if HD is going to be withheld but this did not seem like the plan when speaking to the patient and her son
Plan for HD today  3k bath and uf 1.5 to 2kg as tolerated  trend bmp
difficulty managing secretions/saliva  ENT consult- eval extent of epiglotitis, r/o bacterial infection given wbc=20

## 2017-10-19 NOTE — PROGRESS NOTE ADULT - PROBLEM SELECTOR PLAN 2
CT chest with LLL opacity w air bronchograms  proc .95  wbc rising  levaquin CT chest with LLL opacity w air bronchograms  proc .95  wbc rising  10/16- levaquin/day 4

## 2017-10-19 NOTE — PROGRESS NOTE ADULT - SUBJECTIVE AND OBJECTIVE BOX
Patient seen and examined  no complaints    MSG causes dizziness (Other)  pcn (Hives)  penicillin (Hives)    Hospital Medications:   MEDICATIONS  (STANDING):  aspirin  chewable 81 milliGRAM(s) Enteral Tube daily  brimonidine 0.2% Ophthalmic Solution 1 Drop(s) Both EYES three times a day  dorzolamide 2%/timolol 0.5% Ophthalmic Solution 1 Drop(s) Both EYES daily  epoetin ivon Injectable 4000 Unit(s) IV Push <User Schedule>  heparin  Injectable 5000 Unit(s) SubCutaneous every 8 hours  influenza   Vaccine 0.5 milliLiter(s) IntraMuscular once  latanoprost 0.005% Ophthalmic Solution 1 Drop(s) Both EYES at bedtime  levoFLOXacin IVPB 500 milliGRAM(s) IV Intermittent every 48 hours  levoFLOXacin IVPB      midodrine 10 milliGRAM(s) Oral three times a day  Nephro-asim 1 Tablet(s) Oral daily  pantoprazole  Injectable 40 milliGRAM(s) IV Push two times a day  simvastatin 40 milliGRAM(s) Oral at bedtime  sodium chloride 0.9% Bolus 250 milliLiter(s) IV Bolus once    VITALS:  T(F): 98.3 (10-19-17 @ 05:00), Max: 98.4 (10-19-17 @ 02:00)  HR: 73 (10-19-17 @ 05:00)  BP: 109/61 (10-19-17 @ 05:00)  RR: 18 (10-19-17 @ 05:00)  SpO2: 97% (10-19-17 @ 05:00)  Wt(kg): --    10-18 @ 07:01  -  10-19 @ 07:00  --------------------------------------------------------  IN: 640 mL / OUT: 200 mL / NET: 440 mL    10-19 @ 07:01  -  10-19 @ 12:05  --------------------------------------------------------  IN: 20 mL / OUT: 0 mL / NET: 20 mL        PHYSICAL EXAM:  Constitutional: NAD  HEENT: anicteric sclera, oropharynx clear, MMM  Neck: No JVD  Respiratory: CTAB, no wheezes, rales or rhonchi  Cardiovascular: S1, S2, RRR  Gastrointestinal: BS+, soft, NT/ND; + Peg  Extremities: No cyanosis or clubbing. No peripheral edema  Vascular Access: left PC    LABS:  10-19    142  |  100  |  32<H>  ----------------------------<  92  3.9   |  27  |  2.73<H>    Ca    8.7      19 Oct 2017 08:24      Creatinine Trend: 2.73 <--, 5.38 <--, 7.22 <--, 5.42 <--, 4.78 <--, 1.34 <--, 4.01 <--, 6.81 <--, 7.29 <--, 7.48 <--, 7.79 <--, 9.04 <--                        8.2    9.23  )-----------( 165      ( 19 Oct 2017 08:35 )             25.5     Urine Studies:      RADIOLOGY & ADDITIONAL STUDIES:

## 2017-10-19 NOTE — PROGRESS NOTE ADULT - ASSESSMENT
Impression:  82yo F with history of CVA c/b dysphagia s/p PEG for many years, diastolic CHF, LVOT obstruction, GERD, ESRD (T/Th/Sat via left IJ tunneled dialysis cath), left arm vascular stent, hypotension requiring midodrine, and stroke complicated by dysphagia requiring PEG tube who presented to [facility] for r/o NSTEMI and upper respiratory illness.  GI consulted for concern for esophageal reflux on CT scan as well as copious oral secretions.  Now with acute on chronic anemia and concern for melena overnight.    Problem List:  1) Reflux of stomach contents visualized on CT, history of GERD on once daily high dose PPI  2) Oropharyngeal dysphagia with copious oral secretions, s/p PEG   3) Cough - concern for aspiration PNA vs viral illness.  Now almost resolved completely  4) ESRD on HD with associated hypotension  5)  Chronic diastolic heart failure  6) Acute on chronic anemia, macrocytic.  Dark brown stool on rectal not consistent with melena    Plan:  - would check for hemolytic anemia with LDH, haptoglobin, blood smear  - would switch PPI to IV BID for now as patient does not want endoscopic evaluation and if there is a component of GI bleeding, this will be therapy  - continue anti-cholinergic for management of oral secretions, suctioning  - per chart note, patient declined Reglan and Baclofen to improve LES tightening in favor of maintaining current regimen  - patient does not want to pursue surgical consult for Nissen or endoscopic evaluation (given she is concerned about being intubated)  - consider pall care consult given high aspiration risk - renu from oropharyngeal dysphagia and saliva - with limited corrective options

## 2017-10-19 NOTE — CONSULT NOTE ADULT - PROBLEM SELECTOR RECOMMENDATION 5
d/w med attending  plan as per medicine
PPS 30-40  Full Code  Family asserts one daughter is the healthcare proxy  daughters Radha and Palma were absent, her son was present today  Encouraged the family to bring in HCP

## 2017-10-19 NOTE — CONSULT NOTE ADULT - PROBLEM SELECTOR RECOMMENDATION 2
Begin standing medication to reduce secretion burden  Claims no regurgitation of feeds  Lungs clear, not rhoncorous  Avoid atropine and scopolamine due to anticholinergic impact in a geriatric patient, can foster delirium quite easily  Begin glycopyrolate 0.2 mg q8H standing, minimal B/B/B penetration relative   Monitor for constipation, confusion, or urinary retention  Consider ENT evaluation for Botox injection Begin standing medication to reduce secretion burden  Claims no regurgitation of feeds  Lungs clear, not rhoncorous  Avoid atropine and scopolamine due to anticholinergic impact in a geriatric patient, can foster delirium quite easily  Begin glycopyrolate 0.2 mg q8H standing, minimal B/B/B penetration relative to other agents  Monitor for constipation, confusion, or urinary retention  Consider ENT evaluation for Botox injection

## 2017-10-19 NOTE — PROGRESS NOTE ADULT - SUBJECTIVE AND OBJECTIVE BOX
Follow-up Pulm Progress Note    No new respiratory events overnight.  Denies SOB/CP. recieved robinul last night x1-secreations better today    Medications:  MEDICATIONS  (STANDING):  aspirin  chewable 81 milliGRAM(s) Enteral Tube daily  brimonidine 0.2% Ophthalmic Solution 1 Drop(s) Both EYES three times a day  dorzolamide 2%/timolol 0.5% Ophthalmic Solution 1 Drop(s) Both EYES daily  epoetin ivon Injectable 4000 Unit(s) IV Push <User Schedule>  heparin  Injectable 5000 Unit(s) SubCutaneous every 8 hours  influenza   Vaccine 0.5 milliLiter(s) IntraMuscular once  latanoprost 0.005% Ophthalmic Solution 1 Drop(s) Both EYES at bedtime  levoFLOXacin IVPB 500 milliGRAM(s) IV Intermittent every 48 hours  levoFLOXacin IVPB      midodrine 10 milliGRAM(s) Oral three times a day  Nephro-asim 1 Tablet(s) Oral daily  pantoprazole  Injectable 40 milliGRAM(s) IV Push two times a day  simvastatin 40 milliGRAM(s) Oral at bedtime  sodium chloride 0.9% Bolus 250 milliLiter(s) IV Bolus once    MEDICATIONS  (PRN):  acetaminophen    Suspension. 650 milliGRAM(s) Oral every 6 hours PRN pain  ALBUTerol/ipratropium for Nebulization 3 milliLiter(s) Nebulizer every 6 hours PRN Shortness of Breath and/or Wheezing  benzocaine 15 mG/menthol 3.6 mG Lozenge 1 Lozenge Oral every 6 hours PRN Sore Throat  glycopyrrolate Injectable 0.4 milliGRAM(s) IV Push every 6 hours PRN secretions          Vital Signs Last 24 Hrs  T(C): 36.8 (19 Oct 2017 05:00), Max: 36.9 (19 Oct 2017 02:00)  T(F): 98.3 (19 Oct 2017 05:00), Max: 98.4 (19 Oct 2017 02:00)  HR: 73 (19 Oct 2017 05:00) (72 - 85)  BP: 109/61 (19 Oct 2017 05:00) (84/50 - 129/70)  BP(mean): --  RR: 18 (19 Oct 2017 05:00) (18 - 18)  SpO2: 97% (19 Oct 2017 05:00) (96% - 100%)          10-18 @ 07:01  -  10-19 @ 07:00  --------------------------------------------------------  IN: 640 mL / OUT: 200 mL / NET: 440 mL          LABS:                        8.2    9.23  )-----------( 165      ( 19 Oct 2017 08:35 )             25.5     10-19    142  |  100  |  32<H>  ----------------------------<  92  3.9   |  27  |  2.73<H>    Ca    8.7      19 Oct 2017 08:24            CAPILLARY BLOOD GLUCOSE        PT/INR - ( 19 Oct 2017 02:37 )   PT: 9.7 sec;   INR: 0.89 ratio         PTT - ( 19 Oct 2017 02:37 )  PTT:38.8 sec    Procalcitonin, Serum: 0.95 ng/mL (10-16-17 @ 11:35)                  CULTURES: (if applicable)          Physical Examination:  PULM: decreased bs bilaterally,ronchi improved, no significant sputum production  CVS: S1, S2 heard    RADIOLOGY REVIEWED  CXR: < from: CT Chest No Cont (10.13.17 @ 21:16) >  IMPRESSION:   Small bilateral pleural effusions.  Reflux of contrast from stomach into the esophagus, making the patient at   risk for aspiration.   An indeterminate 1.8 cm peripheral left lower lobe nodular opacity.   Recommend nonemergent follow-up chest CT for further evaluation.  Bilateral atrophic kidneys.  Central venous catheter extends into the inferior vena cave at the   confluence of hepatic veins.    < end of copied text >      CT chest:    TTE:

## 2017-10-19 NOTE — PROGRESS NOTE ADULT - PROBLEM SELECTOR PLAN 3
epigastric pain, unable to manage secretions (above baseline)/worsening dysphagia, GERD-reflux of contrast into esophagus  - GI following-recs noted  anemia- work up as per gi

## 2017-10-19 NOTE — PROVIDER CONTACT NOTE (OTHER) - ACTION/TREATMENT ORDERED:
NP ordered Robitussin cough syrup x 1 dose for now.
RRT, see RRT sheet.
Stat Blood work ordered per PA.  Assessed by the bedside.  Midodrine 10mg via peg dose scheduled for 6am give now.  Awaiting stat CBC, PTT/INR results.  Will administer bolus IV fluids once CBC result

## 2017-10-20 ENCOUNTER — TRANSCRIPTION ENCOUNTER (OUTPATIENT)
Age: 82
End: 2017-10-20

## 2017-10-20 LAB
ANION GAP SERPL CALC-SCNC: 10 MMOL/L — SIGNIFICANT CHANGE UP (ref 5–17)
ANION GAP SERPL CALC-SCNC: 12 MMOL/L — SIGNIFICANT CHANGE UP (ref 5–17)
BUN SERPL-MCNC: 15 MG/DL — SIGNIFICANT CHANGE UP (ref 7–23)
BUN SERPL-MCNC: 21 MG/DL — SIGNIFICANT CHANGE UP (ref 7–23)
CALCIUM SERPL-MCNC: 8.5 MG/DL — SIGNIFICANT CHANGE UP (ref 8.4–10.5)
CALCIUM SERPL-MCNC: 8.9 MG/DL — SIGNIFICANT CHANGE UP (ref 8.4–10.5)
CHLORIDE SERPL-SCNC: 101 MMOL/L — SIGNIFICANT CHANGE UP (ref 96–108)
CHLORIDE SERPL-SCNC: 108 MMOL/L — SIGNIFICANT CHANGE UP (ref 96–108)
CO2 SERPL-SCNC: 24 MMOL/L — SIGNIFICANT CHANGE UP (ref 22–31)
CO2 SERPL-SCNC: 26 MMOL/L — SIGNIFICANT CHANGE UP (ref 22–31)
CREAT SERPL-MCNC: 0.56 MG/DL — SIGNIFICANT CHANGE UP (ref 0.5–1.3)
CREAT SERPL-MCNC: 2.05 MG/DL — HIGH (ref 0.5–1.3)
GLUCOSE SERPL-MCNC: 113 MG/DL — HIGH (ref 70–99)
GLUCOSE SERPL-MCNC: 93 MG/DL — SIGNIFICANT CHANGE UP (ref 70–99)
HCT VFR BLD CALC: 29.7 % — LOW (ref 34.5–45)
HGB BLD-MCNC: 9.3 G/DL — LOW (ref 11.5–15.5)
MCHC RBC-ENTMCNC: 27.4 PG — SIGNIFICANT CHANGE UP (ref 27–34)
MCHC RBC-ENTMCNC: 31.3 GM/DL — LOW (ref 32–36)
MCV RBC AUTO: 87.4 FL — SIGNIFICANT CHANGE UP (ref 80–100)
PLATELET # BLD AUTO: 262 K/UL — SIGNIFICANT CHANGE UP (ref 150–400)
POTASSIUM SERPL-MCNC: 3.8 MMOL/L — SIGNIFICANT CHANGE UP (ref 3.5–5.3)
POTASSIUM SERPL-MCNC: 5.3 MMOL/L — SIGNIFICANT CHANGE UP (ref 3.5–5.3)
POTASSIUM SERPL-SCNC: 3.8 MMOL/L — SIGNIFICANT CHANGE UP (ref 3.5–5.3)
POTASSIUM SERPL-SCNC: 5.3 MMOL/L — SIGNIFICANT CHANGE UP (ref 3.5–5.3)
RBC # BLD: 3.4 M/UL — LOW (ref 3.8–5.2)
RBC # FLD: 14.9 % — HIGH (ref 10.3–14.5)
SODIUM SERPL-SCNC: 139 MMOL/L — SIGNIFICANT CHANGE UP (ref 135–145)
SODIUM SERPL-SCNC: 142 MMOL/L — SIGNIFICANT CHANGE UP (ref 135–145)
WBC # BLD: 5.27 K/UL — SIGNIFICANT CHANGE UP (ref 3.8–10.5)
WBC # FLD AUTO: 5.27 K/UL — SIGNIFICANT CHANGE UP (ref 3.8–10.5)

## 2017-10-20 PROCEDURE — 99233 SBSQ HOSP IP/OBS HIGH 50: CPT

## 2017-10-20 RX ORDER — ROBINUL 0.2 MG/ML
0.2 INJECTION INTRAMUSCULAR; INTRAVENOUS EVERY 6 HOURS
Qty: 0 | Refills: 0 | Status: DISCONTINUED | OUTPATIENT
Start: 2017-10-20 | End: 2017-10-20

## 2017-10-20 RX ORDER — LACTOBACILLUS ACIDOPHILUS 100MM CELL
1 CAPSULE ORAL DAILY
Qty: 0 | Refills: 0 | Status: DISCONTINUED | OUTPATIENT
Start: 2017-10-20 | End: 2017-10-22

## 2017-10-20 RX ORDER — ROBINUL 0.2 MG/ML
0.2 INJECTION INTRAMUSCULAR; INTRAVENOUS EVERY 8 HOURS
Qty: 0 | Refills: 0 | Status: DISCONTINUED | OUTPATIENT
Start: 2017-10-20 | End: 2017-10-22

## 2017-10-20 RX ADMIN — Medication 1 TABLET(S): at 11:24

## 2017-10-20 RX ADMIN — BRIMONIDINE TARTRATE 1 DROP(S): 2 SOLUTION/ DROPS OPHTHALMIC at 13:02

## 2017-10-20 RX ADMIN — HEPARIN SODIUM 5000 UNIT(S): 5000 INJECTION INTRAVENOUS; SUBCUTANEOUS at 21:49

## 2017-10-20 RX ADMIN — LATANOPROST 1 DROP(S): 0.05 SOLUTION/ DROPS OPHTHALMIC; TOPICAL at 21:49

## 2017-10-20 RX ADMIN — Medication 81 MILLIGRAM(S): at 11:24

## 2017-10-20 RX ADMIN — PANTOPRAZOLE SODIUM 40 MILLIGRAM(S): 20 TABLET, DELAYED RELEASE ORAL at 06:39

## 2017-10-20 RX ADMIN — ROBINUL 0.2 MILLIGRAM(S): 0.2 INJECTION INTRAMUSCULAR; INTRAVENOUS at 21:49

## 2017-10-20 RX ADMIN — HEPARIN SODIUM 5000 UNIT(S): 5000 INJECTION INTRAVENOUS; SUBCUTANEOUS at 06:35

## 2017-10-20 RX ADMIN — BRIMONIDINE TARTRATE 1 DROP(S): 2 SOLUTION/ DROPS OPHTHALMIC at 21:49

## 2017-10-20 RX ADMIN — MIDODRINE HYDROCHLORIDE 10 MILLIGRAM(S): 2.5 TABLET ORAL at 06:36

## 2017-10-20 RX ADMIN — ERYTHROPOIETIN 4000 UNIT(S): 10000 INJECTION, SOLUTION INTRAVENOUS; SUBCUTANEOUS at 15:23

## 2017-10-20 RX ADMIN — MIDODRINE HYDROCHLORIDE 10 MILLIGRAM(S): 2.5 TABLET ORAL at 21:49

## 2017-10-20 RX ADMIN — DORZOLAMIDE HYDROCHLORIDE TIMOLOL MALEATE 1 DROP(S): 20; 5 SOLUTION/ DROPS OPHTHALMIC at 11:24

## 2017-10-20 RX ADMIN — PANTOPRAZOLE SODIUM 40 MILLIGRAM(S): 20 TABLET, DELAYED RELEASE ORAL at 19:18

## 2017-10-20 RX ADMIN — SIMVASTATIN 40 MILLIGRAM(S): 20 TABLET, FILM COATED ORAL at 21:49

## 2017-10-20 RX ADMIN — BRIMONIDINE TARTRATE 1 DROP(S): 2 SOLUTION/ DROPS OPHTHALMIC at 06:36

## 2017-10-20 RX ADMIN — MIDODRINE HYDROCHLORIDE 10 MILLIGRAM(S): 2.5 TABLET ORAL at 13:01

## 2017-10-20 RX ADMIN — ROBINUL 0.2 MILLIGRAM(S): 0.2 INJECTION INTRAMUSCULAR; INTRAVENOUS at 13:02

## 2017-10-20 RX ADMIN — HEPARIN SODIUM 5000 UNIT(S): 5000 INJECTION INTRAVENOUS; SUBCUTANEOUS at 11:24

## 2017-10-20 NOTE — PROGRESS NOTE ADULT - ASSESSMENT
83F w/ hx of ESRD (on HD Tues/Thurs/Sat), HTN, CVA (~20 years ago; s/p PEG w/ left hemiparesis/dysphagia at baseline), LVOT obstruction, HLD and GERD who presented to [facility] w/ 1 week hx of sore throat/chest pain and transferred to [facility] (10/12) after pt was found to have elevated troponins in the setting of missed HD w/ course c/b hypotension, bradycardia and syncopal event. Transferred to MICU on 10/13 after RRT was called for syncopal event, required phenylephrine while in MICU for hypotension. BP improved and transferred to the floor. Being managed medically for dysphagia/increased secretions. On ceftriaxone for aspiration PNA. Had drop in H&H, suspected to be GI bleed:

## 2017-10-20 NOTE — PROGRESS NOTE ADULT - ATTENDING COMMENTS
Spoke with patient and her son at bedside. She has been spitting out her saliva since her stroke in the early 1990s and denies any significant problem with it. She is able to sleep for five hours at night and denies aspiration events.  For the past two months, she has had acid reflux into her mouth more often than before, several times per week. She feels that this is controlled with once daily PPI and occasional Famotidine in the evening. She takes the PPI every morning about 10 min before her first of three tube feeds per day.  She thinks that she has no problem with aspiration and does not want any change to her PEG-tube.  She reports cough over two weeks before admission with a cold, now largely resolved with treatment for PNA. She denies chronic cough.    I advised her to take her PPI at least 30 min before her first meal in the morning as it requires an acidic pH for absorption, and that she could continue it twice daily as given now if she felt she needed better control of her reflux than before.
Complex case.   Hypotension in the setting of moderate to severe LVOT obstruction, moderate AS.  Hypotension initially limiting dialysis; now improved.   Now off phenylephrine  HR is as optimized as possible  Continue current management with midodrine.  Keep net even in HD.  Will follow closely.
Patient interviewed, examined and chart reviewed.  Case discussed with fellow.  Agree w/ Assessment and Plan as outlined.    Yon Guadarrama MD [facility]  P: [phone]  Spectra:  [phone]  Office: [phone]
Complex case.   Hypotension in the setting of moderate to severe LVOT obstruction, moderate AS.  Hypotension limiting dialysis  Trial off phenylephrine resulted in critical drop in BP  HR is as optimized as possible  Would attempt very gentle IVF  Place back on phenyl  Will follow closely.          45 minutes spent in critical care including evaluation, formulation of plan, discussion with primary team. > 50% of time spent in counseling with family and other physicians involved in care of patient.
Patient interviewed, examined and chart reviewed.  Case discussed with fellow.  Agree w/ Assessment and Plan as outlined.    Yon Guadarrama MD [facility]  P: [phone]  Spectra:  [phone]  Office: [phone]
Ms. Macias is awake, alert, currently requiring some phenylephrine in addition to midodrine.  She has a cough which is secondary to post nasal congestion.  She is producing a lot of oral secretions and states she is unable to swallow since her stroke,  This is contributing to cough.  does not appear volume overloaded today.  Will discuss not removing fluid with renal in order to decrese pressor requirement.  With  LVOT seen on echo fluid balance has been delicate in this patient.  Continue to decrease pressors as tolerated.  Will try dose of robinol today if oral secretions improve.
Mrs. Macias is stable off pressors today.  She may transfer out of the mICU.  Nees speech and swallow evaluation as she is not swallowing her saliva.  On presentation received one dose of antibiotics but subsequent cultures were negative and there was no infection identified.  She has been stable off antibiotics.
Palliative care consult called, to see patient today.
Dispo: home with home PT when ready. The only thing pending is the question about revision of her fistula, and palliative care consult.
If H&H remains stable overnight, patient can likely be discharged over the weekend. PT park recommended home with home PT. The aptient's disposition was communicated with the .
shock mgmt as per MICU  hold off on cough evaluation at present  eventual CT chest
Agree with above
Agree with above

## 2017-10-20 NOTE — PROGRESS NOTE ADULT - ASSESSMENT
83F c hx HLD, CVA c/b dysphagia s/p PEG, grade 1 DHF, LVOT obstruction, GERD, ESRD (T/Th/Sat via left IJ tunneled dialysis cath), left arm vascular stent, hypotension requiring midodrine, presented to [place] for worsening cough for 10 days, transferred to [facility] for NSTEMI mgmt. c/c/b hypotension/bradycardia/ excessive secretions/aspiration

## 2017-10-20 NOTE — DISCHARGE NOTE ADULT - PLAN OF CARE
To prevent worsening anemia -Hgb remains > 8.0, no bloody stool output noted, patient continues to decline endoscopy, anemia ?secondary to chronic kidney disease  -Continue protonix as ordered  -Monitor for vomiting blood or dark tarry stools    Take your medications as prescribed by your Gastroenterologist.  -Avoid NSAIDs unless your Health Care Provider tells you that it is ok (Aspirin, Ibuprofen, Advil, Motrin, Aleve).  Follow up with your PCP within 1-2 weeks of discharge. -started on standing glycopyrrolate for secretions, seen by palliative, continued outpatient via G-tube SEEK IMMEDIATE MEDICAL CARE IF:  You have pain in your arms, neck, jaw, teeth, or back.   Your pain increases or changes in intensity or duration.   You develop nausea, vomiting, or sweating (diaphoresis).  You develop shortness of breath, or you faint.  Your vomit is green, yellow, black, or looks like coffee grounds or blood.  Your stool is red, bloody, or black.  These symptoms could be signs of other problems, such as heart disease, gastric bleeding, or esophageal bleeding. -Tolerated Hemodialysis well, continue with midodrine for HD, NO plan for revision of fistula this admission. -Completed Levaquin x 7 days, now considered stable Weigh yourself daily.  If you gain 3lbs in 3 days, or 5lbs in a week call your Health Care Provider.  Do not eat or drink foods containing more than 2000mg of salt (sodium) in your diet every day.  Call your Health Care Provider if you have any swelling or increased swelling in your feet, ankles, and/or stomach.  Take all of your medication as directed.  If you become dizzy call your Health Care Provider.

## 2017-10-20 NOTE — PROGRESS NOTE ADULT - PROBLEM SELECTOR PLAN 5
Full Code  Recommend MOLST completion over the weekend by the primary team if she is discharge to address code status, intubation, and rehospitalization.  If she stays, we have scheduled a family meeting for 1pm with the Pt and her surrogate on Monday

## 2017-10-20 NOTE — DISCHARGE NOTE ADULT - MEDICATION SUMMARY - MEDICATIONS TO TAKE
I will START or STAY ON the medications listed below when I get home from the hospital:    aspirin 81 mg oral tablet, chewable  -- 1 tab(s) by mouth once a day, As Needed  -- Indication: For CAD    simvastatin 40 mg oral tablet  -- 1 tab(s) by gastrostomy tube once a day (at bedtime)  -- Indication: For HLD    Proventil HFA 90 mcg/inh inhalation aerosol  -- 2 puff(s) inhaled every 6 hours, As Needed for shortness of breath/wheezing  -- For inhalation only.  It is very important that you take or use this exactly as directed.  Do not skip doses or discontinue unless directed by your doctor.  Obtain medical advice before taking any non-prescription drugs as some may affect the action of this medication.  Shake well before use.    -- Indication: For Cough    epoetin ivon  -- Indication: For Anemia due to chronic kidney disease    glycopyrrolate 2 mg oral tablet  -- 1 tab(s) by gastrostomy tube every 8 hours   -- May cause drowsiness.  Alcohol may intensify this effect.  Use care when operating dangerous machinery.  Take medication on an empty stomach 1 hour before or 2 to 3 hours after a meal unless otherwise directed by your doctor.    -- Indication: For Sialorrhea    Pepcid AC 10 mg oral tablet  -- 1 tab(s) by mouth 2 times a day, As Needed  -- Indication: For Gastroesophageal reflux disease, esophagitis presence not specified    midodrine 5 mg oral tablet  -- 3 tab(s) by mouth once a day on tuesday, thursday and saturdays (pre dialysis)  -- Indication: For Hemodialysis-associated hypotension    midodrine 5 mg oral tablet  -- 1 tab(s) by mouth Monday, Wednesday, Friday and sunday ( non dialysis days  -- Indication: For Hemodialysis-associated hypotension    brimonidine 0.2% ophthalmic solution  -- 1 drop(s) to each affected eye 3 times a day  -- Indication: For Glaucoma    Cosopt 2.23%-0.68% ophthalmic solution  -- 1 drop(s) to each affected eye 2 times a day  -- Indication: For Glaucoma    Travatan Z 0.004% ophthalmic solution  -- 1 drop(s) to each affected eye once a day (in the evening)  -- Indication: For Glaucoma    Renagel 800 mg oral tablet  -- 2 tab(s) by mouth 3 times a day  -- Indication: For ESRD (end stage renal disease)    omeprazole 40 mg oral delayed release capsule  -- 1 cap(s) by mouth once a day  -- Indication: For Gastroesophageal reflux disease, esophagitis presence not specified    Darcy-Melody oral tablet  -- 1 tab(s) by gastrostomy tube once a day  -- Indication: For ESRD (end stage renal disease)

## 2017-10-20 NOTE — DISCHARGE NOTE ADULT - HOSPITAL COURSE
83F c hx HLD, CVA c/b dysphagia s/p PEG, grade 1 DHF, LVOT obstruction, GERD, ESRD (T/Th/Sat via left IJ tunneled dialysis cath), left arm vascular stent, hypotension requiring midodrine, presented to [place] for worsening cough for 10 days, thought to have NSTEMI and transferred to [facility] for further management. On workup by cardiology, determined to be from missing dialysis sessions and not true ACS. She was transferred to MICU on 10/13 after RRT for syncopal event with bradycardia and hypotension. These hemodynamics were thought to be secondary to LVOT obstruction. The patient was on phenylephrine whilst in the MICU in order to complete hemodialysis; phenylephrine was titrated off on 10/14. Subsequently her pressures improved and she was transferred to the floor. She had copious upper airway secretions, was started on robinul with good outcome. She had an acute drop in her H&H, thought to be from GI bleed, however she declined endoscopy. 83F c hx HLD, CVA c/b dysphagia s/p PEG, grade 1 DHF, LVOT obstruction, GERD, ESRD (T/Th/Sat via left IJ tunneled dialysis cath), left arm vascular stent, hypotension requiring midodrine, presented to [place] for worsening cough for 10 days, thought to have NSTEMI and transferred to [facility] for further management. On workup by cardiology, determined to be from missing dialysis sessions and not true ACS. She was transferred to MICU on 10/13 after RRT for syncopal event with bradycardia and hypotension. These hemodynamics were thought to be secondary to LVOT obstruction. The patient was on phenylephrine whilst in the MICU in order to complete hemodialysis; phenylephrine was titrated off on 10/14. Subsequently her pressures improved and she was transferred to the floor. She had copious upper airway secretions, was started on robinul with good outcome. She had an acute drop in her H&H, thought to be from GI bleed, however she declined endoscopy. She had no bloody/black stool output, her H/H remained stable, she experienced no more hypotensive episodes and was deemed fit for discharge to home.     Total time spent on discharge: 38 minutes.

## 2017-10-20 NOTE — PROGRESS NOTE ADULT - PROBLEM SELECTOR PLAN 2
-started on standing glycopyrrolate for secretions  -patient reports clinical improvement  -appreciate palliative care recommendations

## 2017-10-20 NOTE — DISCHARGE NOTE ADULT - PATIENT PORTAL LINK FT
“You can access the Follow[facility] Patient Portal, offered by [facility], by registering with the following website: [email or web address]”

## 2017-10-20 NOTE — PROGRESS NOTE ADULT - ASSESSMENT
HPI:  83F c hx HLD, CVA c/b dysphagia s/p PEG, grade 1 DHF, LVOT obstruction, GERD, ESRD (T/Th/Sat via left IJ tunneled dialysis cath), left arm vascular stent, hypotension requiring midodrine, presented to [place] for worsening cough for 10 days, transferred to [facility] for NSTEMI mgmt  with large secretion burden consulted for assistance with medical decision making

## 2017-10-20 NOTE — CHART NOTE - NSCHARTNOTEFT_GEN_A_CORE
Nutrition Follow-up   Pt presented to [facility] for r/o NSTEMI and upper respiratory illness.  GI consulted for concern for esophageal reflux on CT scan as well as copious oral secretions.  Now with acute on chronic anemia and concern for melena overnight. Per GI: patient does not want to pursue surgical consult for Nissen or endoscopic evaluation. Pt with high aspiration risk - renu from oropharyngeal dysphagia and saliva - with limited corrective options. palliative care consulted. ESRD on HD.       Source: Patient [X]    Family [x]     other [X] pt's son, Comprehensive review of medical records     Diet : Nepro at 45ml/hr x 18 hours.       Patient reports diarrhea, RN aware. Consider providing probiotics.           Enteral /Parenteral Nutrition: Nepro at 45ml/hr x 18 hours.       Current Weight: Weight (kg): 50.6 (10-15 @ 03:00)- 10/19: 50.1kg wt appears stable.   % Weight Change    Pertinent Medications: MEDICATIONS  (STANDING):  aspirin  chewable 81 milliGRAM(s) Enteral Tube daily  brimonidine 0.2% Ophthalmic Solution 1 Drop(s) Both EYES three times a day  dorzolamide 2%/timolol 0.5% Ophthalmic Solution 1 Drop(s) Both EYES daily  epoetin ivon Injectable 4000 Unit(s) IV Push <User Schedule>  heparin  Injectable 5000 Unit(s) SubCutaneous every 8 hours  influenza   Vaccine 0.5 milliLiter(s) IntraMuscular once  latanoprost 0.005% Ophthalmic Solution 1 Drop(s) Both EYES at bedtime  levoFLOXacin IVPB 500 milliGRAM(s) IV Intermittent every 48 hours  levoFLOXacin IVPB      midodrine 10 milliGRAM(s) Oral three times a day  Nephro-asim 1 Tablet(s) Oral daily  pantoprazole  Injectable 40 milliGRAM(s) IV Push two times a day  simvastatin 40 milliGRAM(s) Oral at bedtime  sodium chloride 0.9% Bolus 250 milliLiter(s) IV Bolus once    MEDICATIONS  (PRN):  acetaminophen    Suspension. 650 milliGRAM(s) Oral every 6 hours PRN pain  ALBUTerol/ipratropium for Nebulization 3 milliLiter(s) Nebulizer every 6 hours PRN Shortness of Breath and/or Wheezing  benzocaine 15 mG/menthol 3.6 mG Lozenge 1 Lozenge Oral every 6 hours PRN Sore Throat  glycopyrrolate Injectable 0.4 milliGRAM(s) IV Push every 6 hours PRN secretions    Pertinent Labs:  10-19 Na139 mmol/L Glu 110 mg/dL<H> K+ 4.4 mmol/L Cr  3.36 mg/dL<H> BUN 45 mg/dL<H> Phos n/a   Alb n/a   PAB n/a         Skin: 1+ left wrist, Left arm edema, no pressure injury     Estimated Needs:   [x] no change since previous assessment  [ ] recalculated:       Previous Nutrition Diagnosis:     [ ] Inadequate Energy Intake [ ]Inadequate Oral Intake [ ] Excessive Energy Intake     [ ] Underweight [X] Increased Nutrient Needs [ ] Overweight/Obesity     [ ] Altered GI Function [ ] Unintended Weight Loss [ ] Food & Nutrition Related Knowledge Deficit [ ] Malnutrition          Nutrition Diagnosis is [X] ongoing  [ ] resolved [ ] not applicable          New Nutrition Diagnosis: [x] not applicable       Interventions: May consider adding probiotics as pt is having diarrhea. Consider changing to 24 hours feeds. Nepro 1.8 at 35ml/hr x 24 hours, provides: 840ml fluid, 1512kcal (30kcal/kg), 68gm protein (1.36gm/kg) based on last wt of 50.1kg.     Recommend  1) See tube feeding recommendation/intervention above  2) Monitor tube feeding infusion and tolerance   3) Continue to monitor weight, labs and skin integrity.         Monitoring and Evaluation:   follow up per protocol  RD to remain available for further nutritional interventions as indicated.   Jerri Tobias, MS, RD, CDN #[phone]

## 2017-10-20 NOTE — PROGRESS NOTE ADULT - PROBLEM SELECTOR PLAN 2
Maintain ATC glyco  If the patient is being discharged over the weekend, then please convert it to PO to be delivered via PEG (It is compatible)

## 2017-10-20 NOTE — PROGRESS NOTE ADULT - SUBJECTIVE AND OBJECTIVE BOX
Sitting comfortable  Issues with diarrhea overnight  Spitting in cup  Son at the bedside          T(C): 37.2 (10-20-17 @ 13:20), Max: 37.2 (10-20-17 @ 13:20)  HR: 74 (10-20-17 @ 13:20) (62 - 82)  BP: 86/55 (10-20-17 @ 13:20) (86/55 - 118/69)  RR: 18 (10-20-17 @ 13:20) (18 - 19)  SpO2: 98% (10-20-17 @ 13:20) (94% - 98%)  Wt(kg): --      19 Oct 2017 07:01  -  20 Oct 2017 07:00  --------------------------------------------------------  IN:    Free Water: 440 mL    Nepro: 585 mL  Total IN: 1025 mL    OUT:  Total OUT: 0 mL    Total NET: 1025 mL          10-19 @ 07:01  -  10-20 @ 07:00  --------------------------------------------------------  IN: 1025 mL / OUT: 0 mL / NET: 1025 mL      CAPILLARY BLOOD GLUCOSE            acetaminophen    Suspension. 650 milliGRAM(s) Oral every 6 hours PRN  ALBUTerol/ipratropium for Nebulization 3 milliLiter(s) Nebulizer every 6 hours PRN  aspirin  chewable 81 milliGRAM(s) Enteral Tube daily  benzocaine 15 mG/menthol 3.6 mG Lozenge 1 Lozenge Oral every 6 hours PRN  brimonidine 0.2% Ophthalmic Solution 1 Drop(s) Both EYES three times a day  dorzolamide 2%/timolol 0.5% Ophthalmic Solution 1 Drop(s) Both EYES daily  epoetin ivon Injectable 4000 Unit(s) IV Push <User Schedule>  glycopyrrolate Injectable 0.2 milliGRAM(s) IV Push every 8 hours  heparin  Injectable 5000 Unit(s) SubCutaneous every 8 hours  influenza   Vaccine 0.5 milliLiter(s) IntraMuscular once  lactobacillus acidophilus 1 Tablet(s) Oral daily  latanoprost 0.005% Ophthalmic Solution 1 Drop(s) Both EYES at bedtime  levoFLOXacin IVPB 500 milliGRAM(s) IV Intermittent every 48 hours  levoFLOXacin IVPB      midodrine 10 milliGRAM(s) Oral three times a day  Nephro-asim 1 Tablet(s) Oral daily  pantoprazole  Injectable 40 milliGRAM(s) IV Push two times a day  simvastatin 40 milliGRAM(s) Oral at bedtime  sodium chloride 0.9% Bolus 250 milliLiter(s) IV Bolus once          10-20    142  |  108  |  15  ----------------------------<  93  3.8   |  24  |  0.56    Ca    8.5      20 Oct 2017 08:24        Procalc  BNP  ABG                          9.3    5.27  )-----------( 262      ( 20 Oct 2017 07:55 )             29.7   PT/INR - ( 19 Oct 2017 02:37 )   PT: 9.7 sec;   INR: 0.89 ratio         PTT - ( 19 Oct 2017 02:37 )  PTT:38.8 sec        blood and urine cultures          PERTINENT PM/SXH:   Hypotension  Murmur  Osteoporosis  Chronic renal failure  History of CVA (cerebrovascular accident)    Status post insertion of dialysis catheter  S/P gastrostomy  Arteriovenous graft for hemodialysis in place, primary  S/P cataract extraction    SOCIAL HISTORY:   Significant other/partner:  [ ] YES  [x ] NO               Children:  [ x] YES  [ ] NO                   Episcopalian/Spirituality: Restorationist  Substance hx:  [ ] YES   [ x] NO                   Tobacco hx:  [ ] YES  [x ] NO                       Alcohol hx: [x ] YES  [ ] NO         Home Opioid hx:  [ ] YES  [ x] NO   Living Situation: [x ] Home  [ ] Long term care  [ ] Rehab [ ] Other    FAMILY HISTORY:  Family history of breast cancer (Grandparent)  Family history of heart disease  History of hypertension    [ ] Family history non-contributory   x  BASELINE (I)ADLs (prior to admission):  [place]: [ ] total  [ ] moderate [ ] dependent    ADVANCE DIRECTIVES:    Full Code  MOLST  [ ] YES [x ] NO                      [ ] Completed  Health Care Proxy [ ] YES  [x ] NO   [ ] Completed Purported  Living Will  [ ] YES [ ] NO             [ x] Surrogate  [ ] HCP  [ ] Guardian:       Children                                                           Phone#:    Allergies    MSG causes dizziness (Other)  pcn (Hives)  penicillin (Hives)    Intolerances             PRESENT SYMPTOMS:  Source: [x ] Patient   [ ] Family   [ ] Team     Pain:                        [ x] No [ ] Yes             [ ] Mild [ ] Moderate [ ] Severe    Onset -  Location -  Duration -  Character -  Alleviating/Aggravating -  Radiation -  Timing -      Dyspnea:                [ x] No [ ] Yes             [ ] Mild [ ] Moderate [ ] Severe    Anxiety:                  [ x] No [ ] Yes             [ ] Mild [ ] Moderate [ ] Severe    Fatigue:                  [x ] No [ ] Yes             [ ] Mild [ ] Moderate [ ] Severe    Nausea:                  [x ] No [ ] Yes             [ ] Mild [ ] Moderate [ ] Severe    Loss of appetite:   [ ] No [x ] Yes             [x ] Mild [ ] Moderate [ ] Severe    Constipation:        [ x] No [ ] Yes             [ ] Mild [ ] Moderate [ ] Severe    Other Symptoms:  [ x] All other review of systems negative   [ ] Unable to obtain due to poor mentation     Karnofsky Performance Score/Palliative Performance Status Version 2:   40      %    PHYSICAL EXAM:     18 Oct 2017 07:01  -  19 Oct 2017 07:00  --------------------------------------------------------  IN: 640 mL / OUT: 200 mL / NET: 440 mL    19 Oct 2017 07:01  -  19 Oct 2017 18:32  --------------------------------------------------------  IN: 440 mL / OUT: 0 mL / NET: 440 mL        General:  [x ] Alert  [ ] Oriented x      [ ] Lethargic  [ ] Agitated   [ ] Cachexia   [ ] Unarousable  [ x] Verbal  [ ] Non-Verbal    HEENT:  [ ] Normal   [ ] Dry mouth   [ ] ET Tube    [ ] Trach  [ ] Oral lesions Continued secretions    Lungs:   [ x] Clear [ ] Tachypnea  [ ] Audible excessive secretions   [ ] Rhonchi        [ ] Right [ ] Left [ ] Bilateral  [ ] Crackles        [ ] Right [ ] Left [ ] Bilateral  [ ] Wheezing     [ ] Right [ ] Left [ ] Bilateral    Cardiovascular:  [x ] Regular [ ] Irregular [ ] Tachycardia   [ ] Bradycardia  [ ] Murmur [ ] Other    Abdomen: [x ] Soft  [ ] Distended   [ ] +BS  [ ] Non tender [ ] Tender  [ ]PEG   [ ]OGT/ NGT   Last BM:       Genitourinary: [x ] Normal [ ] Incontinent   [ ] Oliguria/Anuria   [ ] Pearl    Musculoskeletal:  [ ] Normal   [x ] Weakness  [ ] Bedbound/Wheelchair bound [ ] Edema    Neurological: [ ] No focal deficits  [ ] Cognitive impairment  [ x] Dysphagia [ ] Dysarthria [ ] Paresis [ ] Other weakness    Skin: [ x] Normal   [ ] Pressure ulcer(s)                  [ ] Rash    LABS:                        8.2    9.23  )-----------( 165      ( 19 Oct 2017 08:35 )             25.5     10-19    139  |  100  |  45<H>  ----------------------------<  110<H>  4.4   |  24  |  3.36<H>    Ca    9.2      19 Oct 2017 16:23      PT/INR - ( 19 Oct 2017 02:37 )   PT: 9.7 sec;   INR: 0.89 ratio         PTT - ( 19 Oct 2017 02:37 )  PTT:38.8 sec      Shock: [ ] Septic [ ] Cardiogenic [ ] Neurologic [ ] Hypovolemic  Vasopressors x   Inotrophs x     Protein Calorie Malnutrition: [ ] Mild [ ] Moderate [ ] Severe    Oral Intake: [ ] Unable/mouth care only [ ] Minimal [ ] Moderate [ ] Full Capability  Diet: [ ] NPO [ ] Tube feeds [ ] TPN [ ] Other     RADIOLOGY & ADDITIONAL STUDIES: Reviewed    REFERRALS:   [ ] Chaplaincy  [ ] Hospice  [ ] Child Life  [ ] Social Work  [ ] Case management [ ] Holistic Therapy

## 2017-10-20 NOTE — DISCHARGE NOTE ADULT - CARE PROVIDER_API CALL
Meredith Dash), Medicine  Primary Care  [address] [place] [address]  Phone: [phone]  Fax: [phone]

## 2017-10-20 NOTE — PROGRESS NOTE ADULT - SUBJECTIVE AND OBJECTIVE BOX
Patient seen and examined  no complaints    MSG causes dizziness (Other)  pcn (Hives)  penicillin (Hives)    Hospital Medications:   MEDICATIONS  (STANDING):  aspirin  chewable 81 milliGRAM(s) Enteral Tube daily  brimonidine 0.2% Ophthalmic Solution 1 Drop(s) Both EYES three times a day  dorzolamide 2%/timolol 0.5% Ophthalmic Solution 1 Drop(s) Both EYES daily  epoetin ivon Injectable 4000 Unit(s) IV Push <User Schedule>  glycopyrrolate Injectable 0.2 milliGRAM(s) IV Push every 8 hours  heparin  Injectable 5000 Unit(s) SubCutaneous every 8 hours  influenza   Vaccine 0.5 milliLiter(s) IntraMuscular once  latanoprost 0.005% Ophthalmic Solution 1 Drop(s) Both EYES at bedtime  levoFLOXacin IVPB 500 milliGRAM(s) IV Intermittent every 48 hours  levoFLOXacin IVPB      midodrine 10 milliGRAM(s) Oral three times a day  Nephro-asim 1 Tablet(s) Oral daily  pantoprazole  Injectable 40 milliGRAM(s) IV Push two times a day  simvastatin 40 milliGRAM(s) Oral at bedtime  sodium chloride 0.9% Bolus 250 milliLiter(s) IV Bolus once        VITALS:  T(F): 98.1 (10-20-17 @ 12:01), Max: 98.7 (10-19-17 @ 20:11)  HR: 62 (10-20-17 @ 12:01)  BP: 104/61 (10-20-17 @ 12:01)  RR: 18 (10-20-17 @ 12:01)  SpO2: 98% (10-20-17 @ 12:01)  Wt(kg): --    10-19 @ 07:01  -  10-20 @ 07:00  --------------------------------------------------------  IN: 1025 mL / OUT: 0 mL / NET: 1025 mL        PHYSICAL EXAM:  Constitutional: NAD  HEENT: anicteric sclera, oropharynx clear, MMM  Neck: No JVD  Respiratory: b/l rhonchi  Cardiovascular: S1, S2, RRR  Gastrointestinal: BS+, soft, NT/ND; + pEG  Extremities: No cyanosis or clubbing. No peripheral edema  Neurological: A/O x 3,       LABS:  10-20    142  |  108  |  15  ----------------------------<  93  3.8   |  24  |  0.56    Ca    8.5      20 Oct 2017 08:24      Creatinine Trend: 0.56 <--, 3.36 <--, 2.73 <--, 5.38 <--, 7.22 <--, 5.42 <--, 4.78 <--, 1.34 <--, 4.01 <--                        9.3    5.27  )-----------( 262      ( 20 Oct 2017 07:55 )             29.7     Urine Studies:      RADIOLOGY & ADDITIONAL STUDIES:

## 2017-10-20 NOTE — DISCHARGE NOTE ADULT - SECONDARY DIAGNOSIS.
Cough Gastroesophageal reflux disease, esophagitis presence not specified Hemodialysis-associated hypotension Aspiration pneumonia, unspecified aspiration pneumonia type, unspecified laterality, unspecified part of lung Chronic diastolic congestive heart failure

## 2017-10-20 NOTE — PROGRESS NOTE ADULT - SUBJECTIVE AND OBJECTIVE BOX
Patient is a 83y old  Female who presents with a chief complaint of chest pain, sore throat (12 Oct 2017 03:25)      SUBJECTIVE / OVERNIGHT EVENTS: no acute events overnight. Patient states that her secretions are better. Denies any abdominal discomfort.     MEDICATIONS  (STANDING):  aspirin  chewable 81 milliGRAM(s) Enteral Tube daily  brimonidine 0.2% Ophthalmic Solution 1 Drop(s) Both EYES three times a day  dorzolamide 2%/timolol 0.5% Ophthalmic Solution 1 Drop(s) Both EYES daily  epoetin ivon Injectable 4000 Unit(s) IV Push <User Schedule>  glycopyrrolate Injectable 0.2 milliGRAM(s) IV Push every 8 hours  heparin  Injectable 5000 Unit(s) SubCutaneous every 8 hours  influenza   Vaccine 0.5 milliLiter(s) IntraMuscular once  latanoprost 0.005% Ophthalmic Solution 1 Drop(s) Both EYES at bedtime  levoFLOXacin IVPB 500 milliGRAM(s) IV Intermittent every 48 hours  levoFLOXacin IVPB      midodrine 10 milliGRAM(s) Oral three times a day  Nephro-asim 1 Tablet(s) Oral daily  pantoprazole  Injectable 40 milliGRAM(s) IV Push two times a day  simvastatin 40 milliGRAM(s) Oral at bedtime  sodium chloride 0.9% Bolus 250 milliLiter(s) IV Bolus once    MEDICATIONS  (PRN):  acetaminophen    Suspension. 650 milliGRAM(s) Oral every 6 hours PRN pain  ALBUTerol/ipratropium for Nebulization 3 milliLiter(s) Nebulizer every 6 hours PRN Shortness of Breath and/or Wheezing  benzocaine 15 mG/menthol 3.6 mG Lozenge 1 Lozenge Oral every 6 hours PRN Sore Throat      Vital Signs Last 24 Hrs  T(C): 36.7 (20 Oct 2017 12:01), Max: 37.1 (19 Oct 2017 20:11)  T(F): 98.1 (20 Oct 2017 12:01), Max: 98.7 (19 Oct 2017 20:11)  HR: 62 (20 Oct 2017 12:01) (62 - 82)  BP: 104/61 (20 Oct 2017 12:01) (89/51 - 118/69)  BP(mean): --  RR: 18 (20 Oct 2017 12:01) (18 - 19)  SpO2: 98% (20 Oct 2017 12:01) (94% - 98%)  CAPILLARY BLOOD GLUCOSE        I&O's Summary    19 Oct 2017 07:01  -  20 Oct 2017 07:00  --------------------------------------------------------  IN: 1025 mL / OUT: 0 mL / NET: 1025 mL        PHYSICAL EXAM:  GENERAL: NAD, resting comfortably   HEAD:  Atraumatic, Normocephalic  EYES: EOMI, PERRLA, conjunctiva and sclera clear  NECK: Supple, No JVD  CHEST/LUNG: clear b/l, good air movement, no wheezing   HEART: Regular rate and rhythm; +S1/S2  ABDOMEN: Soft, Nontender, Nondistended; Bowel sounds present. PEG in place.   EXTREMITIES:  2+ Peripheral Pulses, No clubbing, cyanosis, or edema  PSYCH: AAOx3  NEUROLOGY: non-focal    LABS:                        9.3    5.27  )-----------( 262      ( 20 Oct 2017 07:55 )             29.7     10-20    142  |  108  |  15  ----------------------------<  93  3.8   |  24  |  0.56    Ca    8.5      20 Oct 2017 08:24      PT/INR - ( 19 Oct 2017 02:37 )   PT: 9.7 sec;   INR: 0.89 ratio         PTT - ( 19 Oct 2017 02:37 )  PTT:38.8 sec

## 2017-10-20 NOTE — DISCHARGE NOTE ADULT - CARE PLAN
Principal Discharge DX:	Anemia due to blood loss  Goal:	To prevent worsening anemia  Instructions for follow-up, activity and diet:	-Hgb remains > 8.0, no bloody stool output noted, patient continues to decline endoscopy, anemia ?secondary to chronic kidney disease  -Continue protonix as ordered  -Monitor for vomiting blood or dark tarry stools    Take your medications as prescribed by your Gastroenterologist.  -Avoid NSAIDs unless your Health Care Provider tells you that it is ok (Aspirin, Ibuprofen, Advil, Motrin, Aleve).  Follow up with your PCP within 1-2 weeks of discharge.  Secondary Diagnosis:	Cough  Instructions for follow-up, activity and diet:	-started on standing glycopyrrolate for secretions, seen by palliative, continued outpatient via G-tube  Secondary Diagnosis:	Gastroesophageal reflux disease, esophagitis presence not specified  Instructions for follow-up, activity and diet:	SEEK IMMEDIATE MEDICAL CARE IF:  You have pain in your arms, neck, jaw, teeth, or back.   Your pain increases or changes in intensity or duration.   You develop nausea, vomiting, or sweating (diaphoresis).  You develop shortness of breath, or you faint.  Your vomit is green, yellow, black, or looks like coffee grounds or blood.  Your stool is red, bloody, or black.  These symptoms could be signs of other problems, such as heart disease, gastric bleeding, or esophageal bleeding.  Secondary Diagnosis:	Hemodialysis-associated hypotension  Instructions for follow-up, activity and diet:	-Tolerated Hemodialysis well, continue with midodrine for HD, NO plan for revision of fistula this admission.  Secondary Diagnosis:	Aspiration pneumonia, unspecified aspiration pneumonia type, unspecified laterality, unspecified part of lung  Instructions for follow-up, activity and diet:	-Completed Levaquin x 7 days, now considered stable  Secondary Diagnosis:	Chronic diastolic congestive heart failure  Instructions for follow-up, activity and diet:	Weigh yourself daily.  If you gain 3lbs in 3 days, or 5lbs in a week call your Health Care Provider.  Do not eat or drink foods containing more than 2000mg of salt (sodium) in your diet every day.  Call your Health Care Provider if you have any swelling or increased swelling in your feet, ankles, and/or stomach.  Take all of your medication as directed.  If you become dizzy call your Health Care Provider.

## 2017-10-21 LAB
ANION GAP SERPL CALC-SCNC: 11 MMOL/L — SIGNIFICANT CHANGE UP (ref 5–17)
BUN SERPL-MCNC: 30 MG/DL — HIGH (ref 7–23)
CALCIUM SERPL-MCNC: 9.7 MG/DL — SIGNIFICANT CHANGE UP (ref 8.4–10.5)
CHLORIDE SERPL-SCNC: 99 MMOL/L — SIGNIFICANT CHANGE UP (ref 96–108)
CO2 SERPL-SCNC: 28 MMOL/L — SIGNIFICANT CHANGE UP (ref 22–31)
CREAT SERPL-MCNC: 2.96 MG/DL — HIGH (ref 0.5–1.3)
GLUCOSE SERPL-MCNC: 85 MG/DL — SIGNIFICANT CHANGE UP (ref 70–99)
HCT VFR BLD CALC: 26.3 % — LOW (ref 34.5–45)
HGB BLD-MCNC: 8.1 G/DL — LOW (ref 11.5–15.5)
MCHC RBC-ENTMCNC: 29.8 PG — SIGNIFICANT CHANGE UP (ref 27–34)
MCHC RBC-ENTMCNC: 30.8 GM/DL — LOW (ref 32–36)
MCV RBC AUTO: 96.7 FL — SIGNIFICANT CHANGE UP (ref 80–100)
PLATELET # BLD AUTO: 263 K/UL — SIGNIFICANT CHANGE UP (ref 150–400)
POTASSIUM SERPL-MCNC: 4.1 MMOL/L — SIGNIFICANT CHANGE UP (ref 3.5–5.3)
POTASSIUM SERPL-SCNC: 4.1 MMOL/L — SIGNIFICANT CHANGE UP (ref 3.5–5.3)
RBC # BLD: 2.72 M/UL — LOW (ref 3.8–5.2)
RBC # FLD: 17.8 % — HIGH (ref 10.3–14.5)
SODIUM SERPL-SCNC: 138 MMOL/L — SIGNIFICANT CHANGE UP (ref 135–145)
WBC # BLD: 9.38 K/UL — SIGNIFICANT CHANGE UP (ref 3.8–10.5)
WBC # FLD AUTO: 9.38 K/UL — SIGNIFICANT CHANGE UP (ref 3.8–10.5)

## 2017-10-21 PROCEDURE — 99233 SBSQ HOSP IP/OBS HIGH 50: CPT

## 2017-10-21 RX ADMIN — Medication 650 MILLIGRAM(S): at 22:38

## 2017-10-21 RX ADMIN — BRIMONIDINE TARTRATE 1 DROP(S): 2 SOLUTION/ DROPS OPHTHALMIC at 06:34

## 2017-10-21 RX ADMIN — BRIMONIDINE TARTRATE 1 DROP(S): 2 SOLUTION/ DROPS OPHTHALMIC at 21:37

## 2017-10-21 RX ADMIN — SIMVASTATIN 40 MILLIGRAM(S): 20 TABLET, FILM COATED ORAL at 21:37

## 2017-10-21 RX ADMIN — PANTOPRAZOLE SODIUM 40 MILLIGRAM(S): 20 TABLET, DELAYED RELEASE ORAL at 06:34

## 2017-10-21 RX ADMIN — HEPARIN SODIUM 5000 UNIT(S): 5000 INJECTION INTRAVENOUS; SUBCUTANEOUS at 21:37

## 2017-10-21 RX ADMIN — HEPARIN SODIUM 5000 UNIT(S): 5000 INJECTION INTRAVENOUS; SUBCUTANEOUS at 13:01

## 2017-10-21 RX ADMIN — BENZOCAINE AND MENTHOL 1 LOZENGE: 5; 1 LIQUID ORAL at 21:37

## 2017-10-21 RX ADMIN — Medication 1 TABLET(S): at 13:01

## 2017-10-21 RX ADMIN — PANTOPRAZOLE SODIUM 40 MILLIGRAM(S): 20 TABLET, DELAYED RELEASE ORAL at 18:38

## 2017-10-21 RX ADMIN — MIDODRINE HYDROCHLORIDE 10 MILLIGRAM(S): 2.5 TABLET ORAL at 13:00

## 2017-10-21 RX ADMIN — HEPARIN SODIUM 5000 UNIT(S): 5000 INJECTION INTRAVENOUS; SUBCUTANEOUS at 06:34

## 2017-10-21 RX ADMIN — LATANOPROST 1 DROP(S): 0.05 SOLUTION/ DROPS OPHTHALMIC; TOPICAL at 21:37

## 2017-10-21 RX ADMIN — Medication 1 TABLET(S): at 13:00

## 2017-10-21 RX ADMIN — DORZOLAMIDE HYDROCHLORIDE TIMOLOL MALEATE 1 DROP(S): 20; 5 SOLUTION/ DROPS OPHTHALMIC at 13:00

## 2017-10-21 RX ADMIN — Medication 650 MILLIGRAM(S): at 21:38

## 2017-10-21 RX ADMIN — BRIMONIDINE TARTRATE 1 DROP(S): 2 SOLUTION/ DROPS OPHTHALMIC at 13:00

## 2017-10-21 RX ADMIN — MIDODRINE HYDROCHLORIDE 10 MILLIGRAM(S): 2.5 TABLET ORAL at 06:35

## 2017-10-21 RX ADMIN — Medication 81 MILLIGRAM(S): at 13:00

## 2017-10-21 RX ADMIN — MIDODRINE HYDROCHLORIDE 10 MILLIGRAM(S): 2.5 TABLET ORAL at 21:38

## 2017-10-21 NOTE — PROGRESS NOTE ADULT - ASSESSMENT
83F c hx HLD, CVA c/b dysphagia s/p PEG, grade 1 DHF, LVOT obstruction, GERD, ESRD (T/Th/Sat via left IJ tunneled dialysis cath), left arm vascular stent, hypotension requiring midodrine, presented to [place] for worsening cough for 10 days, transferred to [facility] for NSTEMI mgmt. c/c/b hypotension/bradycardia/ excessive secretions/aspiration  [facility] following for HD

## 2017-10-21 NOTE — PROGRESS NOTE ADULT - NSHPATTENDINGPLANDISCUSS_GEN_ALL_CORE
team
ICU team and family
patient, patient's son.
pt
IM NP, Family
patient, medicine NP, Pulmonary NP.
patient, patient's son who was at bedside, medicine NP
medicine NP, patient, case management

## 2017-10-21 NOTE — PROGRESS NOTE ADULT - SUBJECTIVE AND OBJECTIVE BOX
Patient is a 83y old  Female who presents with a chief complaint of chest pain, sore throat (20 Oct 2017 15:41)   Subjective: no acute overnight events. Reports improved secretion. Feels well this morning, no aches/pains, no chest pain, palpitations, cough, fever, sob.    14 point ROS otherwise negative.    VITAL SIGNS:  Vital Signs Last 24 Hrs  T(C): 36.6 (21 Oct 2017 11:58), Max: 36.7 (20 Oct 2017 19:13)  T(F): 97.9 (21 Oct 2017 11:58), Max: 98.1 (20 Oct 2017 19:13)  HR: 73 (21 Oct 2017 11:58) (73 - 82)  BP: 112/57 (21 Oct 2017 11:58) (98/60 - 117/63)  BP(mean): --  RR: 18 (21 Oct 2017 11:58) (18 - 18)  SpO2: 97% (21 Oct 2017 11:58) (93% - 98%)      PHYSICAL EXAM:     GENERAL: no acute distress  HEENT: PERRLA, EOMI, moist oropharynx   RESPIRATORY: CTAB, no w/c   CARDIOVASCULAR: RRR, no murmurs, gallops, rubs  ABDOMINAL: soft, non-tender, non-distended, positive bowel sounds, peg in place   EXTREMITIES: no clubbing, cyanosis, or edema  NEUROLOGICAL: alert and oriented x 3, +L sided weakness, chronic  SKIN: no rashes or lesions   MUSCULOSKELETAL: no gross joint deformity                          8.1    9.38  )-----------( 263      ( 21 Oct 2017 15:16 )             26.3     10-21    138  |  99  |  30<H>  ----------------------------<  85  4.1   |  28  |  2.96<H>    Ca    9.7      21 Oct 2017 15:16      MEDICATIONS  (STANDING):  aspirin  chewable 81 milliGRAM(s) Enteral Tube daily  brimonidine 0.2% Ophthalmic Solution 1 Drop(s) Both EYES three times a day  dorzolamide 2%/timolol 0.5% Ophthalmic Solution 1 Drop(s) Both EYES daily  epoetin ivon Injectable 4000 Unit(s) IV Push <User Schedule>  glycopyrrolate Injectable 0.2 milliGRAM(s) IV Push every 8 hours  heparin  Injectable 5000 Unit(s) SubCutaneous every 8 hours  influenza   Vaccine 0.5 milliLiter(s) IntraMuscular once  lactobacillus acidophilus 1 Tablet(s) Oral daily  latanoprost 0.005% Ophthalmic Solution 1 Drop(s) Both EYES at bedtime  levoFLOXacin IVPB 500 milliGRAM(s) IV Intermittent every 48 hours  levoFLOXacin IVPB      midodrine 10 milliGRAM(s) Oral three times a day  Nephro-asim 1 Tablet(s) Oral daily  pantoprazole  Injectable 40 milliGRAM(s) IV Push two times a day  simvastatin 40 milliGRAM(s) Oral at bedtime  sodium chloride 0.9% Bolus 250 milliLiter(s) IV Bolus once    MEDICATIONS  (PRN):  acetaminophen    Suspension. 650 milliGRAM(s) Oral every 6 hours PRN pain  ALBUTerol/ipratropium for Nebulization 3 milliLiter(s) Nebulizer every 6 hours PRN Shortness of Breath and/or Wheezing  benzocaine 15 mG/menthol 3.6 mG Lozenge 1 Lozenge Oral every 6 hours PRN Sore Throat

## 2017-10-21 NOTE — PROGRESS NOTE ADULT - ASSESSMENT
83F w/ hx of ESRD (on HD Tues/Thurs/Sat), HTN, CVA (~20 years ago; s/p PEG w/ left hemiparesis/dysphagia at baseline), LVOT obstruction, HLD and GERD who presented to [facility] w/ 1 week hx of sore throat/chest pain and transferred to [facility] (10/12) after pt was found to have elevated troponins in the setting of missed HD w/ course c/b hypotension, bradycardia and syncopal event. Transferred to MICU on 10/13 after RRT was called for syncopal event, required phenylephrine while in MICU for hypotension. BP improved and transferred to the floor. Being managed medically for dysphagia/increased secretions. On ceftriaxone for aspiration PNA. Had drop in H&H, suspected to be GI bleed:

## 2017-10-21 NOTE — PROGRESS NOTE ADULT - SUBJECTIVE AND OBJECTIVE BOX
Patient seen and examined bedside    no complaints today. denied sob.     MSG causes dizziness (Other)  pcn (Hives)  penicillin (Hives)    Hospital Medications:   MEDICATIONS  (STANDING):  aspirin  chewable 81 milliGRAM(s) Enteral Tube daily  brimonidine 0.2% Ophthalmic Solution 1 Drop(s) Both EYES three times a day  dorzolamide 2%/timolol 0.5% Ophthalmic Solution 1 Drop(s) Both EYES daily  epoetin ivon Injectable 4000 Unit(s) IV Push <User Schedule>  glycopyrrolate Injectable 0.2 milliGRAM(s) IV Push every 8 hours  heparin  Injectable 5000 Unit(s) SubCutaneous every 8 hours  influenza   Vaccine 0.5 milliLiter(s) IntraMuscular once  latanoprost 0.005% Ophthalmic Solution 1 Drop(s) Both EYES at bedtime  levoFLOXacin IVPB 500 milliGRAM(s) IV Intermittent every 48 hours  levoFLOXacin IVPB      midodrine 10 milliGRAM(s) Oral three times a day  Nephro-asim 1 Tablet(s) Oral daily  pantoprazole  Injectable 40 milliGRAM(s) IV Push two times a day  simvastatin 40 milliGRAM(s) Oral at bedtime  sodium chloride 0.9% Bolus 250 milliLiter(s) IV Bolus once    VITALS:  Vital Signs Last 24 Hrs  T(C): 36.6 (21 Oct 2017 11:58), Max: 36.7 (20 Oct 2017 19:13)  T(F): 97.9 (21 Oct 2017 11:58), Max: 98.1 (20 Oct 2017 19:13)  HR: 73 (21 Oct 2017 11:58) (73 - 82)  BP: 112/57 (21 Oct 2017 11:58) (98/60 - 117/63)  BP(mean): --  RR: 18 (21 Oct 2017 11:58) (18 - 18)  SpO2: 97% (21 Oct 2017 11:58) (93% - 98%)    I&O's Summary    20 Oct 2017 07:01  -  21 Oct 2017 07:00  --------------------------------------------------------  IN: 325 mL / OUT: 500 mL / NET: -175 mL      PHYSICAL EXAM:  Constitutional: NAD  HEENT: anicteric sclera, oropharynx clear, MMM  Neck: No JVD  Respiratory: b/l rhonchi  Cardiovascular: S1, S2, RRR  Gastrointestinal: BS+, soft, NT/ND; + pEG  Extremities: No cyanosis or clubbing. No peripheral edema  Neurological: A/O x 3,       LABS:  no labs today  10-20    142  |  108  |  15  ----------------------------<  93  3.8   |  24  |  0.56    Ca    8.5      20 Oct 2017 08:24      Creatinine Trend: 0.56 <--, 3.36 <--, 2.73 <--, 5.38 <--, 7.22 <--, 5.42 <--, 4.78 <--, 1.34 <--, 4.01 <--                        9.3    5.27  )-----------( 262      ( 20 Oct 2017 07:55 )             29.7     Urine Studies:      RADIOLOGY & ADDITIONAL STUDIES:

## 2017-10-22 VITALS
SYSTOLIC BLOOD PRESSURE: 101 MMHG | OXYGEN SATURATION: 100 % | HEART RATE: 57 BPM | RESPIRATION RATE: 19 BRPM | DIASTOLIC BLOOD PRESSURE: 48 MMHG | TEMPERATURE: 98 F

## 2017-10-22 DIAGNOSIS — Z29.9 ENCOUNTER FOR PROPHYLACTIC MEASURES, UNSPECIFIED: ICD-10-CM

## 2017-10-22 LAB
ANION GAP SERPL CALC-SCNC: 15 MMOL/L — SIGNIFICANT CHANGE UP (ref 5–17)
BUN SERPL-MCNC: 44 MG/DL — HIGH (ref 7–23)
CALCIUM SERPL-MCNC: 9.3 MG/DL — SIGNIFICANT CHANGE UP (ref 8.4–10.5)
CHLORIDE SERPL-SCNC: 100 MMOL/L — SIGNIFICANT CHANGE UP (ref 96–108)
CO2 SERPL-SCNC: 24 MMOL/L — SIGNIFICANT CHANGE UP (ref 22–31)
CREAT SERPL-MCNC: 4.33 MG/DL — HIGH (ref 0.5–1.3)
GLUCOSE SERPL-MCNC: 92 MG/DL — SIGNIFICANT CHANGE UP (ref 70–99)
HCT VFR BLD CALC: 25.5 % — LOW (ref 34.5–45)
HGB BLD-MCNC: 8.3 G/DL — LOW (ref 11.5–15.5)
MCHC RBC-ENTMCNC: 32.5 PG — SIGNIFICANT CHANGE UP (ref 27–34)
MCHC RBC-ENTMCNC: 32.6 GM/DL — SIGNIFICANT CHANGE UP (ref 32–36)
MCV RBC AUTO: 99.7 FL — SIGNIFICANT CHANGE UP (ref 80–100)
PLATELET # BLD AUTO: 284 K/UL — SIGNIFICANT CHANGE UP (ref 150–400)
POTASSIUM SERPL-MCNC: 4.2 MMOL/L — SIGNIFICANT CHANGE UP (ref 3.5–5.3)
POTASSIUM SERPL-SCNC: 4.2 MMOL/L — SIGNIFICANT CHANGE UP (ref 3.5–5.3)
RBC # BLD: 2.56 M/UL — LOW (ref 3.8–5.2)
RBC # FLD: 17.1 % — HIGH (ref 10.3–14.5)
SODIUM SERPL-SCNC: 139 MMOL/L — SIGNIFICANT CHANGE UP (ref 135–145)
WBC # BLD: 11.2 K/UL — HIGH (ref 3.8–10.5)
WBC # FLD AUTO: 11.2 K/UL — HIGH (ref 3.8–10.5)

## 2017-10-22 PROCEDURE — 99239 HOSP IP/OBS DSCHRG MGMT >30: CPT

## 2017-10-22 PROCEDURE — 99497 ADVNCD CARE PLAN 30 MIN: CPT

## 2017-10-22 RX ORDER — ALBUTEROL 90 UG/1
2 AEROSOL, METERED ORAL
Qty: 1 | Refills: 0 | OUTPATIENT
Start: 2017-10-22 | End: 2017-11-21

## 2017-10-22 RX ORDER — PANTOPRAZOLE SODIUM 20 MG/1
40 TABLET, DELAYED RELEASE ORAL ONCE
Qty: 0 | Refills: 0 | Status: COMPLETED | OUTPATIENT
Start: 2017-10-22 | End: 2017-10-22

## 2017-10-22 RX ORDER — ROBINUL 0.2 MG/ML
1 INJECTION INTRAMUSCULAR; INTRAVENOUS
Qty: 90 | Refills: 0
Start: 2017-10-22 | End: 2017-11-21

## 2017-10-22 RX ORDER — PANTOPRAZOLE SODIUM 20 MG/1
40 TABLET, DELAYED RELEASE ORAL ONCE
Qty: 0 | Refills: 0 | Status: DISCONTINUED | OUTPATIENT
Start: 2017-10-22 | End: 2017-10-22

## 2017-10-22 RX ORDER — ERYTHROPOIETIN 10000 [IU]/ML
0 INJECTION, SOLUTION INTRAVENOUS; SUBCUTANEOUS
Qty: 0 | Refills: 0 | DISCHARGE
Start: 2017-10-22

## 2017-10-22 RX ADMIN — BRIMONIDINE TARTRATE 1 DROP(S): 2 SOLUTION/ DROPS OPHTHALMIC at 06:10

## 2017-10-22 RX ADMIN — Medication 1 TABLET(S): at 13:02

## 2017-10-22 RX ADMIN — PANTOPRAZOLE SODIUM 40 MILLIGRAM(S): 20 TABLET, DELAYED RELEASE ORAL at 06:10

## 2017-10-22 RX ADMIN — PANTOPRAZOLE SODIUM 40 MILLIGRAM(S): 20 TABLET, DELAYED RELEASE ORAL at 18:01

## 2017-10-22 RX ADMIN — MIDODRINE HYDROCHLORIDE 10 MILLIGRAM(S): 2.5 TABLET ORAL at 13:04

## 2017-10-22 RX ADMIN — HEPARIN SODIUM 5000 UNIT(S): 5000 INJECTION INTRAVENOUS; SUBCUTANEOUS at 13:03

## 2017-10-22 RX ADMIN — BRIMONIDINE TARTRATE 1 DROP(S): 2 SOLUTION/ DROPS OPHTHALMIC at 13:04

## 2017-10-22 RX ADMIN — HEPARIN SODIUM 5000 UNIT(S): 5000 INJECTION INTRAVENOUS; SUBCUTANEOUS at 06:09

## 2017-10-22 RX ADMIN — Medication 81 MILLIGRAM(S): at 13:03

## 2017-10-22 RX ADMIN — Medication 3 MILLILITER(S): at 13:03

## 2017-10-22 RX ADMIN — DORZOLAMIDE HYDROCHLORIDE TIMOLOL MALEATE 1 DROP(S): 20; 5 SOLUTION/ DROPS OPHTHALMIC at 13:04

## 2017-10-22 NOTE — PROGRESS NOTE ADULT - PROBLEM SELECTOR PROBLEM 3
Idiopathic hypotension
Idiopathic hypotension
Dysphagia, unspecified type
Dysphagia, unspecified type
Idiopathic hypotension
Dysphagia, unspecified type
Gastroesophageal reflux disease, esophagitis presence not specified
Idiopathic hypotension
Dysphagia, unspecified type
ESRD (end stage renal disease)
Gastroesophageal reflux disease, esophagitis presence not specified
Gastroesophageal reflux disease, esophagitis presence not specified
Idiopathic hypotension
Gastroesophageal reflux disease, esophagitis presence not specified
Dysphagia, unspecified type

## 2017-10-22 NOTE — PROGRESS NOTE ADULT - PROVIDER SPECIALTY LIST ADULT
Cardiology
Gastroenterology
Hospitalist
Internal Medicine
MICU
MICU
Nephrology
Palliative Care
Pulmonology
Gastroenterology
Nephrology
Pulmonology
Internal Medicine

## 2017-10-22 NOTE — PROGRESS NOTE ADULT - ASSESSMENT
83F w/ hx of ESRD (on HD Tues/Thurs/Sat), HTN, CVA (~20 years ago; s/p PEG w/ left hemiparesis/dysphagia at baseline), LVOT obstruction, HLD and GERD who presented to [facility] w/ 1 week hx of sore throat/chest pain and transferred to [facility] (10/12) after pt was found to have elevated troponins in the setting of missed HD w/ course c/b hypotension, bradycardia and syncopal event. Transferred to MICU on 10/13 after RRT was called for syncopal event, required phenylephrine while in MICU for hypotension. BP improved and transferred to the floor. Being managed medically for dysphagia/increased secretions. On ceftriaxone for aspiration PNA. Had drop in H&H, suspected to be GI bleed:

## 2017-10-22 NOTE — PROGRESS NOTE ADULT - PROBLEM SELECTOR PLAN 8
- ppx: on hsq  - diet: tube feeds  - dispo: plan for discharge home pending PT re-eval and if CBC from 10/22 stable. - ppx: on hsq  - diet: tube feeds  - dispo: face to face GOC discussion had with patient and family. Patient wishes to remain full code and would accept a trial of invasive mechanical ventilation. She wishes to be re-hospitalized for any acute and emergent medical issues. Total time spent 36 minutes. MOLST form was offered, however patient and family opted to have it filled out with PCP Dr. Kaylah Dash.

## 2017-10-22 NOTE — PROGRESS NOTE ADULT - PROBLEM SELECTOR PROBLEM 2
Pneumonia
Pneumonia
Anemia due to chronic kidney disease
Anemia due to chronic kidney disease
Cough
Pneumonia
Anemia due to chronic kidney disease
Cough
Cough
Gastroesophageal reflux disease, esophagitis presence not specified
Gastroesophageal reflux disease, esophagitis presence not specified
Hemodialysis-associated hypotension
NSTEMI (non-ST elevated myocardial infarction)
Pneumonia
Sialorrhea
Anemia due to chronic kidney disease
Anemia due to chronic kidney disease
Cough
Pneumonia

## 2017-10-22 NOTE — PROGRESS NOTE ADULT - PROBLEM SELECTOR PROBLEM 7
ESRD (end stage renal disease)
Hypotension
ESRD (end stage renal disease)
Hypotension
Anemia due to chronic kidney disease
Anemia due to chronic kidney disease
Dysphagia, unspecified type
ESRD (end stage renal disease)
ESRD (end stage renal disease)
Other specified hypotension

## 2017-10-22 NOTE — PROGRESS NOTE ADULT - PROBLEM SELECTOR PROBLEM 1
Epiglottitis
ESRD (end stage renal disease)
Anemia due to blood loss
ESRD (end stage renal disease)
Epiglottitis
Anemia due to blood loss
Anemia due to blood loss
Cough
ESRD (end stage renal disease)
Epiglottitis
Upper respiratory tract infection, unspecified type
ESRD (end stage renal disease)
ESRD (end stage renal disease)
Anemia due to blood loss
Epiglottitis
Epiglottitis

## 2017-10-22 NOTE — PROGRESS NOTE ADULT - SUBJECTIVE AND OBJECTIVE BOX
Patient is a 83y old  Female who presents with a chief complaint of chest pain, sore throat (20 Oct 2017 15:41)  No acute events overnight, feels well this morning, comfortable with transfers from bed to chair. No bloody bowel movements or lightheadedness, weakness, chest pain/pressure, sob, palpitations. Eager to go home.    14 point ROS otherwise negative.     VITAL SIGNS:  Vital Signs Last 24 Hrs  T(C): 36.8 (22 Oct 2017 06:08), Max: 37 (21 Oct 2017 20:28)  T(F): 98.3 (22 Oct 2017 06:08), Max: 98.6 (21 Oct 2017 20:28)  HR: 65 (22 Oct 2017 06:08) (65 - 74)  BP: 135/74 (22 Oct 2017 06:08) (111/64 - 135/74)  BP(mean): --  RR: 18 (22 Oct 2017 06:08) (18 - 19)  SpO2: 97% (22 Oct 2017 06:08) (96% - 97%)      PHYSICAL EXAM:     GENERAL: no acute distress  HEENT: PERRLA, EOMI, moist oropharynx, +chronic R lower facial droop  RESPIRATORY: CTAB, no w/c   CARDIOVASCULAR: RRR, no murmurs, gallops, rubs  ABDOMINAL: soft, non-tender, non-distended, positive bowel sounds   EXTREMITIES: no clubbing, cyanosis, or edema  NEUROLOGICAL: alert and oriented x 3, +chronic L sided motor deficit, no sensory deficit  SKIN: no rashes or lesions   MUSCULOSKELETAL: no gross joint deformity                          8.1    9.38  )-----------( 263      ( 21 Oct 2017 15:16 )             26.3     10-22    139  |  100  |  44<H>  ----------------------------<  92  4.2   |  24  |  4.33<H>    Ca    9.3      22 Oct 2017 08:44        CAPILLARY BLOOD GLUCOSE          MEDICATIONS  (STANDING):  aspirin  chewable 81 milliGRAM(s) Enteral Tube daily  brimonidine 0.2% Ophthalmic Solution 1 Drop(s) Both EYES three times a day  dorzolamide 2%/timolol 0.5% Ophthalmic Solution 1 Drop(s) Both EYES daily  epoetin ivon Injectable 4000 Unit(s) IV Push <User Schedule>  glycopyrrolate Injectable 0.2 milliGRAM(s) IV Push every 8 hours  heparin  Injectable 5000 Unit(s) SubCutaneous every 8 hours  influenza   Vaccine 0.5 milliLiter(s) IntraMuscular once  lactobacillus acidophilus 1 Tablet(s) Oral daily  latanoprost 0.005% Ophthalmic Solution 1 Drop(s) Both EYES at bedtime  levoFLOXacin IVPB 500 milliGRAM(s) IV Intermittent every 48 hours  levoFLOXacin IVPB      midodrine 10 milliGRAM(s) Oral three times a day  Nephro-asim 1 Tablet(s) Oral daily  pantoprazole  Injectable 40 milliGRAM(s) IV Push two times a day  simvastatin 40 milliGRAM(s) Oral at bedtime  sodium chloride 0.9% Bolus 250 milliLiter(s) IV Bolus once    MEDICATIONS  (PRN):  acetaminophen    Suspension. 650 milliGRAM(s) Oral every 6 hours PRN pain  ALBUTerol/ipratropium for Nebulization 3 milliLiter(s) Nebulizer every 6 hours PRN Shortness of Breath and/or Wheezing  benzocaine 15 mG/menthol 3.6 mG Lozenge 1 Lozenge Oral every 6 hours PRN Sore Throat

## 2017-10-22 NOTE — PROGRESS NOTE ADULT - PROBLEM SELECTOR PLAN 1
no evidence of epiglottis seen on ENT exam, eval normal  pt with difficulty managing secretions
no evidence of epiglottis seen on ENT exam, eval normal  pt with difficulty managing secretions
s/p HD yesterday- flowsheet reviewed- terminated earlier as above  trend bmp  will monitor  likely next HD monday
-H/H dropped to 8.1, no bloody stool output noted, patient declined endoscopy  -continue to monitor closely, transfuse to Hb > 7, would revisit endoscopy if H/H continues to drop  -continue with IV PPI BID
no evidence of epiglottis seen on ENT exam, eval normal  pt with difficulty managing secretions
s/p HD yesterday- flowsheet reviewed  plan for next hd tomm  trend bmp
- worsening leukocytosis, persistent productive cough   - RVP negative   - CXR clear, but high risk of aspiration   - empiric Zithromax for now   - Duonebs + mucomyst + chest PT
-improved today  -continue with tessalon pearles and robinul  -continue with levaquin   -monitor QTc
-overall improving, will continue with tessalon pearles and robinul  -continue with levaquin for PNA   -monitor QTc
-patient declined endoscopy  -H&H stable overnight  -continue to monitor closely  -continue with IV PPI BID
-productive of white sputum, which is at baseline for the patient  -infectious workup including RVP, CT chest was unremarkable  -Was on empiric abx which were discontinued 10/13. Patient has remained afebrile since abx was d/hima. However, secretions are increased and PCT is elevated as well. Will start on ceftriaxone for PNA.   -continue with robinul, will start on tessalon pearles as well   -no epiglottitis per ENT exam   -likely that patient is aspirating as there was reflux of contrast into the esophagus noted on CT. Tube feeds are continuous. Perhaps placement of jejunal feeding tube would help with this, however aspiration is still a risk regardless. Will explore this option with patient and family.
-pt H/H stable in 8-9 range. Pt wishes to decline endoscopies and any other invasive interventions at this time. Discussed with patient and son the need for close outpatient follow up to ensure H/H does not continue to drop as there may be more urgent need for endoscopies at that time. Pt and son understand.  -transition to PO PPI
Case discussed with renal and the patient  No plans for HD discontinuation
completed HD yesterday, Rx sheet reviewed, net UF 0.5kg, tolearted well.  c/w Nephro TF  dose abxs for ESRD
no evidence of epiglottis seen on ENT exam, eval normal  pt with difficulty managing secretions
plan for HD today  2k bath and uf 0.5kg as tolerated  trend bmp  will monitor
plan for HD today- 3k bath and uf 0.5kg as tolerated  will monitor  trend bmp
plan for HD tomm  k bath and uf 0.5kg as tolerated  trend bmp  will monitor  likely next HD monday
s/p HD yesterday- flowsheet reviewed- no uf; bp did drop; pt completed tx  plan for next hd ; uf as tolerated  will monitor  trend bmp
s/p HD yesterday - as above  plan for HD today  4k bath and no uf  trend bmp  will monitor
plan for hd today  3k bath and uf 0.5kg as tolerated  trend bmp
-suspected GI bleed, FOBT was positive   -patient and family do not wish to have endoscopy done   -will continue with medical management, change PPI to IV BID   -monitor H&H, transfuse to keep Hb >7   -will check hemolysis labs
no evidence of epiglottis seen on ENT exam, eval normal  pt with difficulty managing secretions

## 2017-10-22 NOTE — PROGRESS NOTE ADULT - PROBLEM SELECTOR PLAN 6
-continue with current regimen  -be cautious with fluid removal as this can exacerbate LVOT obstruction   -appreciate cardiology recommendations.
-continue with current regimen  -be cautious with fluid removal as this can exacerbate LVOT obstruction   -appreciate cardiology recommendations.
- also known LVOT  - check echo
-continue with PPI
-continue with current regimen  -be cautious with fluid removal as this can exacerbate LVOT obstruction   -appreciate cardiology recommendations
-continue with current regimen  -be cautious with fluid removal as this can exacerbate LVOT obstruction   -appreciate cardiology recommendations
-continue with current regimen  -be cautious with fluid removal as this can exacerbate LVOT obstruction   -appreciate cardiology recommendations.
-continue with current regimen  -be cautious with fluid removal as this can exacerbate LVOT obstruction   -appreciate cardiology recommendations.

## 2017-10-22 NOTE — PROGRESS NOTE ADULT - PROBLEM SELECTOR PROBLEM 5
R/O Epigastric pain
Epigastric pain
Epigastric pain
Aspiration pneumonia, unspecified aspiration pneumonia type, unspecified laterality, unspecified part of lung
R/O Epigastric pain
Anemia due to chronic kidney disease
Aspiration pneumonia, unspecified aspiration pneumonia type, unspecified laterality, unspecified part of lung
Aspiration pneumonia, unspecified aspiration pneumonia type, unspecified laterality, unspecified part of lung
Debility
ESRD (end stage renal disease)
Epigastric pain
Aspiration pneumonia, unspecified aspiration pneumonia type, unspecified laterality, unspecified part of lung

## 2017-10-22 NOTE — PROGRESS NOTE ADULT - PROBLEM SELECTOR PROBLEM 6
Chronic diastolic congestive heart failure
NSTEMI (non-ST elevated myocardial infarction)
Chronic diastolic congestive heart failure
NSTEMI (non-ST elevated myocardial infarction)
Chronic diastolic congestive heart failure
Gastroesophageal reflux disease, esophagitis presence not specified
NSTEMI (non-ST elevated myocardial infarction)

## 2017-10-22 NOTE — PROGRESS NOTE ADULT - PROBLEM SELECTOR PLAN 4
hd as per renal
- tolerated HD yesterday well  - continue with midodrine for HD   -spoke with renal attending, NO plan for revision of fistula this admission
hd as per renal
- Family requesting lozenges. Cepacol was ordered, explained risk of aspiration to patient and family and they expressed understanding.
- tolerating HD well  - continue with midodrine for HD   -spoke with renal attending, NO plan for revision of fistula this admission
-continue with current regimen  -be cautious with fluid removal as this can exacerbate LVOT obstruction   -appreciate cardiology recommendations
-continue with midodrine for HD   -planned for dialysis today, monitor for hypotension   -spoke with renal attending, NO plan for revision of fistula this admission
-continue with midodrine prior to HD
-continue with midodrine prior to HD
Large degree of immobility
hd as per renal
-continue with midodrine for HD   -planned for dialysis tomorrow  -spoke with renal attending, NO plan for revision of fistula this admission

## 2017-10-22 NOTE — PROGRESS NOTE ADULT - PROBLEM SELECTOR PLAN 2
-started on standing glycopyrrolate for secretions, secretions improved  -patient reports clinical improvement  -appreciate palliative care recommendations

## 2017-11-02 NOTE — ED PROVIDER NOTE - NS_EDPROVIDERDISPOUSERTYPE_ED_A_ED
[facility] Hospital Medicine  Progress Note    Patient Name: Ish Guardado  MRN: [id number]  Patient Class: IP- Inpatient   Admission Date: 10/30/2017  Length of Stay: 2 days  Attending Physician: Yobani Harris MD  Primary Care Provider: Pollo Arana MD        Subjective:     Principal Problem:Ischemic foot    HPI:  Mr. Guardado id a 57 yo male with a PMHx of uncontrolled HTN, prediabetes, COPD, h/o TIA, HLD, h/o substance abuse, noncompliance, and PAD with remote left fem-pop bypass 11/2014 + right fem-pop bypass 9/2016 + right fem-distal bypass 3/2017 with multiple right bypass occlusions, who presented to the ED with c/o right leg pain at rest that has progressively worsened over the past 1 week.  Pain is most severe in right calf and foot.  Associated decreased sensation to foot, skin coolness and discoloration (pale until turned red yesterday).  Patient is on Xarelto, and states he is compliant without missing any doses.  Initial work-up in ED with CTA RLE revealed occlusion of femoral artery to below-knee popliteal artery bypass graft; right femoral artery total occlusion; Occlusion of the origin of right profunda femoral artery; Occluded proximal popliteal artery; Severe stenosis of the tibioperoneal trunk; Severe stenosis of the proximal anterior tibial artery with multiple areas of near complete occlusion; Patent dorsalis pedis artery.  Case discussed with Vascular Surgery, who recommend peripheral angiogram.  No heparin drip due to patient being on Xarelto.  Hospital Medicine consulted for admission.  Currently, patient appears comfortable, in NAD.  BP notably elevated upon arrival to ED at 203/102.  Patient admits to noncompliance with BP meds, but insists he has never missed a dose of Xarelto.    Hospital Course:  30 October:  Thrombolysis by 5 mg tPA pulse spray then thrombectomy.  Placed  EKOS with 80% removal of thrombus.  Post procedure monitoring in the ICU.  Severe pain to  the right foot and lower leg after reperfusion.  Controlled with Precedex and Fentanyl infusions.  31 October:  Thrombolysis check and distal ballooning.  01 November:  Hemodynamically stable with no signs of bleeding.  Transferred to telemetry.    Interval History:  Pain and tenderness right foot improved.  Weaned from Precedex and Fentanyl.  Hemodynamically stable without signs of bleeding.  Transfer to floor.    Review of Systems   Constitutional: Negative.  Negative for chills and fever.   HENT: Negative.  Negative for congestion and sore throat.    Eyes: Negative.  Negative for visual disturbance.   Respiratory: Negative.  Negative for cough, shortness of breath and wheezing.    Cardiovascular: Negative.  Negative for chest pain.   Gastrointestinal: Negative for abdominal pain, diarrhea, nausea and vomiting.   Endocrine: Negative.    Genitourinary: Negative.    Musculoskeletal: Positive for arthralgias and myalgias. Negative for neck stiffness.   Skin: Positive for color change and pallor.   Allergic/Immunologic: Negative.    Neurological: Negative.    Hematological: Negative.    Psychiatric/Behavioral: Negative.    All other systems reviewed and are negative.    Objective:     Vital Signs (Most Recent):  Temp: 99.5 °F (37.5 °C) (11/01/17 1957)  Pulse: 85 (11/01/17 1957)  Resp: 16 (11/01/17 1957)  BP: (!) 177/84 (11/01/17 1957)  SpO2: 97 % (11/01/17 1957) Vital Signs (24h Range):  Temp:  [98.7 °F (37.1 °C)-99.6 °F (37.6 °C)] 99.5 °F (37.5 °C)  Pulse:  [76-95] 85  Resp:  [9-20] 16  SpO2:  [93 %-100 %] 97 %  BP: (110-185)/() 177/84     Weight: 81 kg (178 lb 9.2 oz)  Body mass index is 25.62 kg/m².    Intake/Output Summary (Last 24 hours) at 11/01/17 2114  Last data filed at 11/01/17 1907   Gross per 24 hour   Intake             2595 ml   Output             2550 ml   Net               45 ml      Physical Exam   Constitutional: He is oriented to person, place, and time. He appears well-developed and  Attending Attestation (For Attendings USE Only)... well-nourished. No distress.   HENT:   Head: Normocephalic and atraumatic.   Mouth/Throat: Oropharynx is clear and moist.   Eyes: Conjunctivae and EOM are normal. Pupils are equal, round, and reactive to light.   Neck: No JVD present. No thyromegaly present.   Cardiovascular: Normal rate, regular rhythm and normal heart sounds.  Exam reveals no gallop and no friction rub.    No murmur heard.  Pulmonary/Chest: Effort normal and breath sounds normal. No respiratory distress. He has no wheezes. He has no rales.   Abdominal: Soft. Bowel sounds are normal. He exhibits no distension. There is no tenderness. There is no rebound and no guarding.   Musculoskeletal: Normal range of motion. He exhibits tenderness. He exhibits no edema.   Lymphadenopathy:     He has no cervical adenopathy.   Neurological: He is alert and oriented to person, place, and time. He has normal reflexes. He displays normal reflexes. No cranial nerve deficit.   Skin: Skin is dry. Capillary refill takes more than 3 seconds. No rash noted. He is not diaphoretic. There is erythema. There is pallor.   Right foot mostly with pale erythema and cool compared to left foot.  Right toes pale white.  Tender to palpation.   Psychiatric: He has a normal mood and affect. His behavior is normal. Judgment and thought content normal.       Significant Labs:   CBC:     Recent Labs  Lab 10/31/17  0528 11/01/17  0454   WBC 9.29 9.08   HGB 11.8* 10.6*   HCT 36.5* 32.6*    213     CMP:     Recent Labs  Lab 10/31/17  0527 11/01/17  0454   * 132*   K 4.4 3.8    102   CO2 23 21*   GLU 93 121*   BUN 14 14   CREATININE 0.8 0.7   CALCIUM 9.3 8.8   PROT  --  6.3   ALBUMIN  --  2.8*   BILITOT  --  0.4   ALKPHOS  --  91   AST  --  20   ALT  --  17   ANIONGAP 7* 9   EGFRNONAA >60 >60       Significant Imaging: I have reviewed all pertinent imaging results/findings within the past 24 hours.    Assessment/Plan:      * Ischemic right foot secondary to occluded  fem-distal bypass graft    CTA of RLE revealed occlusion of femoral artery to below-knee popliteal artery bypass graft; right femoral artery total occlusion; Occlusion of the origin of right profunda femoral artery; Occluded proximal popliteal artery; Severe stenosis of the tibioperoneal trunk; Severe stenosis of the proximal anterior tibial artery with multiple areas of near complete occlusion; Patent dorsalis pedis artery.   No heparin drip since patient is on chronic Xarelto.  Resume Xarelto post-procedure; Last dose 10/29 PM.  Continue ASA and statin therapy.  Vascular Surgery performed thrombolysis 30 October with check and ballooning 31 October with improvement in distal flow.        HTN (hypertension)    - Secondary to medication noncompliance.  - Resume home Amlodipine 10mg daily.  Monitor BP trends, and optimize therapy as needed.  Consider adding ACEI for additional benefit in PAD.        PAD with remote right fem-distal bypass    - Remote h/o fem-pop bypass with occlusion in 3/2017 requiring fem-distal bypass.  - Patient with recurrent fem-distal bypass occlusion 5/25/17 s/p thrombolysis, and 6/3/17 s/p thrombolysis + PTA.  - Plan as above.         Mixed hyperlipidemia    - Continue statin.  - FLP.        PAD (peripheral artery disease)    Continue Aspirin and Atorvastatin.          VTE Risk Mitigation         Ordered     rivaroxaban tablet 20 mg  With dinner     Route:  Oral        11/01/17 1211     Medium Risk of VTE  Once      10/30/17 0439     Reason for No Pharmacological VTE Prophylaxis  Once      10/30/17 0439          Critical care time:  30 minutes      Yobani Harris MD  Department of Hospital Medicine   [facility]

## 2017-11-07 NOTE — PROGRESS NOTE ADULT - PROBLEM/PLAN-2
DISPLAY PLAN FREE TEXT
Responsibility to family and others
DISPLAY PLAN FREE TEXT

## 2017-12-19 PROCEDURE — 87324 CLOSTRIDIUM AG IA: CPT

## 2017-12-19 PROCEDURE — 84484 ASSAY OF TROPONIN QUANT: CPT

## 2017-12-19 PROCEDURE — 85018 HEMOGLOBIN: CPT

## 2017-12-19 PROCEDURE — 83605 ASSAY OF LACTIC ACID: CPT

## 2017-12-19 PROCEDURE — 86900 BLOOD TYPING SEROLOGIC ABO: CPT

## 2017-12-19 PROCEDURE — 84145 PROCALCITONIN (PCT): CPT

## 2017-12-19 PROCEDURE — 71250 CT THORAX DX C-: CPT

## 2017-12-19 PROCEDURE — 71045 X-RAY EXAM CHEST 1 VIEW: CPT

## 2017-12-19 PROCEDURE — 84100 ASSAY OF PHOSPHORUS: CPT

## 2017-12-19 PROCEDURE — 93306 TTE W/DOPPLER COMPLETE: CPT

## 2017-12-19 PROCEDURE — 87581 M.PNEUMON DNA AMP PROBE: CPT

## 2017-12-19 PROCEDURE — 87449 NOS EACH ORGANISM AG IA: CPT

## 2017-12-19 PROCEDURE — 82550 ASSAY OF CK (CPK): CPT

## 2017-12-19 PROCEDURE — 82553 CREATINE MB FRACTION: CPT

## 2017-12-19 PROCEDURE — 86901 BLOOD TYPING SEROLOGIC RH(D): CPT

## 2017-12-19 PROCEDURE — 82435 ASSAY OF BLOOD CHLORIDE: CPT

## 2017-12-19 PROCEDURE — 80053 COMPREHEN METABOLIC PANEL: CPT

## 2017-12-19 PROCEDURE — 85014 HEMATOCRIT: CPT

## 2017-12-19 PROCEDURE — 82330 ASSAY OF CALCIUM: CPT

## 2017-12-19 PROCEDURE — 93005 ELECTROCARDIOGRAM TRACING: CPT

## 2017-12-19 PROCEDURE — 82272 OCCULT BLD FECES 1-3 TESTS: CPT

## 2017-12-19 PROCEDURE — 82962 GLUCOSE BLOOD TEST: CPT

## 2017-12-19 PROCEDURE — 99261: CPT

## 2017-12-19 PROCEDURE — 99285 EMERGENCY DEPT VISIT HI MDM: CPT | Mod: 25

## 2017-12-19 PROCEDURE — 82947 ASSAY GLUCOSE BLOOD QUANT: CPT

## 2017-12-19 PROCEDURE — 84132 ASSAY OF SERUM POTASSIUM: CPT

## 2017-12-19 PROCEDURE — 97110 THERAPEUTIC EXERCISES: CPT

## 2017-12-19 PROCEDURE — 87798 DETECT AGENT NOS DNA AMP: CPT

## 2017-12-19 PROCEDURE — 74176 CT ABD & PELVIS W/O CONTRAST: CPT

## 2017-12-19 PROCEDURE — 80048 BASIC METABOLIC PNL TOTAL CA: CPT

## 2017-12-19 PROCEDURE — 87040 BLOOD CULTURE FOR BACTERIA: CPT

## 2017-12-19 PROCEDURE — 94640 AIRWAY INHALATION TREATMENT: CPT

## 2017-12-19 PROCEDURE — 97162 PT EVAL MOD COMPLEX 30 MIN: CPT

## 2017-12-19 PROCEDURE — 85027 COMPLETE CBC AUTOMATED: CPT

## 2017-12-19 PROCEDURE — 86850 RBC ANTIBODY SCREEN: CPT

## 2017-12-19 PROCEDURE — 82565 ASSAY OF CREATININE: CPT

## 2017-12-19 PROCEDURE — 85610 PROTHROMBIN TIME: CPT

## 2017-12-19 PROCEDURE — 84295 ASSAY OF SERUM SODIUM: CPT

## 2017-12-19 PROCEDURE — 87633 RESP VIRUS 12-25 TARGETS: CPT

## 2017-12-19 PROCEDURE — 97116 GAIT TRAINING THERAPY: CPT

## 2017-12-19 PROCEDURE — 85730 THROMBOPLASTIN TIME PARTIAL: CPT

## 2017-12-19 PROCEDURE — 83735 ASSAY OF MAGNESIUM: CPT

## 2017-12-19 PROCEDURE — 82803 BLOOD GASES ANY COMBINATION: CPT

## 2017-12-19 PROCEDURE — 97530 THERAPEUTIC ACTIVITIES: CPT

## 2017-12-19 PROCEDURE — 87486 CHLMYD PNEUM DNA AMP PROBE: CPT

## 2018-01-21 DIAGNOSIS — H40.9 UNSPECIFIED GLAUCOMA: ICD-10-CM

## 2018-01-23 PROBLEM — H40.9 SEVERE STAGE GLAUCOMA: Status: ACTIVE | Noted: 2017-03-22

## 2018-05-29 ENCOUNTER — APPOINTMENT (OUTPATIENT)
Dept: GASTROENTEROLOGY | Facility: CLINIC | Age: 83
End: 2018-05-29
Payer: MEDICARE

## 2018-05-29 ENCOUNTER — OTHER (OUTPATIENT)
Age: 83
End: 2018-05-29

## 2018-05-29 VITALS
HEART RATE: 47 BPM | SYSTOLIC BLOOD PRESSURE: 103 MMHG | HEIGHT: 60 IN | OXYGEN SATURATION: 98 % | RESPIRATION RATE: 15 BRPM | WEIGHT: 104 LBS | BODY MASS INDEX: 20.42 KG/M2 | DIASTOLIC BLOOD PRESSURE: 63 MMHG

## 2018-05-29 DIAGNOSIS — R13.10 DYSPHAGIA, UNSPECIFIED: ICD-10-CM

## 2018-05-29 DIAGNOSIS — R10.9 UNSPECIFIED ABDOMINAL PAIN: ICD-10-CM

## 2018-05-29 DIAGNOSIS — K86.2 CYST OF PANCREAS: ICD-10-CM

## 2018-05-29 PROCEDURE — 99214 OFFICE O/P EST MOD 30 MIN: CPT

## 2018-06-09 PROBLEM — R13.10 DYSPHAGIA: Status: ACTIVE | Noted: 2018-06-09

## 2018-06-09 PROBLEM — K86.2 PANCREAS CYST: Status: ACTIVE | Noted: 2017-06-21

## 2018-06-09 PROBLEM — R10.9 ABDOMINAL PAIN: Status: ACTIVE | Noted: 2017-06-15

## 2018-06-16 NOTE — ED ADULT TRIAGE NOTE - BP NONINVASIVE SYSTOLIC (MM HG)
34 y/o Female with a PMHx of cholelithiasis, small fiber neuropathy, cerebral palsy with spastic diplegia, hypoglycemia (See patient's MHx for further history) seen by [facility] this morning presents to the ED c/o a UTI. Pt states she was prescribed medications for a UTI but was unable to find a pharmacy with the medication in stock, would like to receive medications here. She was told that she had a gal stone and also presents with RUQ pain. Notes RUQ pain, burning urination but denies fevers or chills. Pt is on a liquid diet. 130 32 y/o Female with a PMHx of cholelithiasis, small fiber neuropathy, cerebral palsy with spastic diplegia seen by [facility] this morning for UTI/R flank pain. . Pt states she was prescribed medications for a UTI - cipro suspension but was unable to find a pharmacy with the medication in stock and asking for alternative antibiotics. On liquid diet, she states she is unable to crush her medications and place in liquid as she stataes she cannot tolerate that. She states she had a workup this morning at [facility] ED - had US that showed gallstones, no renal stones and UA that showed UTI. c/o right back pain with dysuria x 1 week.

## 2018-06-18 ENCOUNTER — APPOINTMENT (OUTPATIENT)
Dept: FAMILY MEDICINE | Facility: CLINIC | Age: 83
End: 2018-06-18
Payer: MEDICARE

## 2018-06-18 ENCOUNTER — APPOINTMENT (OUTPATIENT)
Dept: FAMILY MEDICINE | Facility: CLINIC | Age: 83
End: 2018-06-18

## 2018-06-18 VITALS
BODY MASS INDEX: 22.34 KG/M2 | DIASTOLIC BLOOD PRESSURE: 69 MMHG | HEIGHT: 57.5 IN | SYSTOLIC BLOOD PRESSURE: 130 MMHG | OXYGEN SATURATION: 98 % | HEART RATE: 59 BPM | WEIGHT: 105 LBS

## 2018-06-18 PROCEDURE — 99213 OFFICE O/P EST LOW 20 MIN: CPT

## 2018-06-18 RX ORDER — BRIMONIDINE TARTRATE 2 MG/MG
0.2 SOLUTION/ DROPS OPHTHALMIC
Qty: 15 | Refills: 0 | Status: DISCONTINUED | COMMUNITY
Start: 2017-01-11 | End: 2018-06-18

## 2018-06-18 RX ORDER — TIMOLOL MALEATE 5 MG/ML
0.5 SOLUTION OPHTHALMIC
Qty: 5 | Refills: 0 | Status: ACTIVE | COMMUNITY
Start: 2018-05-03

## 2018-06-18 RX ORDER — MIDODRINE HYDROCHLORIDE 5 MG/1
5 TABLET ORAL
Qty: 52 | Refills: 3 | Status: ACTIVE | COMMUNITY
Start: 2018-06-18

## 2018-06-18 RX ORDER — DORZOLAMIDE HYDROCHLORIDE TIMOLOL MALEATE 20; 5 MG/ML; MG/ML
22.3-6.8 SOLUTION/ DROPS OPHTHALMIC
Qty: 10 | Refills: 0 | Status: DISCONTINUED | COMMUNITY
Start: 2017-01-11 | End: 2018-06-18

## 2018-06-18 RX ORDER — ONDANSETRON 4 MG/1
4 TABLET ORAL
Qty: 30 | Refills: 0 | Status: ACTIVE | COMMUNITY
Start: 2018-04-09

## 2018-06-18 RX ORDER — OMEPRAZOLE 40 MG/1
40 CAPSULE, DELAYED RELEASE ORAL
Qty: 90 | Refills: 0 | Status: ACTIVE | COMMUNITY
Start: 2018-05-25

## 2018-06-18 RX ORDER — BRINZOLAMIDE/BRIMONIDINE TARTRATE 10; 2 MG/ML; MG/ML
1-0.2 SUSPENSION/ DROPS OPHTHALMIC
Qty: 8 | Refills: 0 | Status: ACTIVE | COMMUNITY
Start: 2017-11-27

## 2018-06-18 RX ORDER — ERGOCALCIFEROL 1.25 MG/1
1.25 MG CAPSULE, LIQUID FILLED ORAL
Qty: 12 | Refills: 0 | Status: ACTIVE | COMMUNITY
Start: 2018-01-16

## 2018-06-18 NOTE — REVIEW OF SYSTEMS
[Cough] : cough [Abdominal Pain] : no abdominal pain [Constipation] : constipation [Negative] : Heme/Lymph [FreeTextEntry6] : chronic cough [FreeTextEntry7] : P [de-identified] : dry skin

## 2018-06-18 NOTE — HISTORY OF PRESENT ILLNESS
[FreeTextEntry8] : 82 yo F is a new patient here for GI referral. She is having an issue with her feeding tube, there is a broken part. She wants to see [facility] GI clinic but does not have a specific doctor. She needs PEG tube replacement. She also has ESRD on dialysis. She gets labs done once a month. \par \par No CP/SOB. Sometimes she will have lower extremity swelling. She does not report any pain, inflammation, pus draining from the PEG insertion site. She does report some tugging pain and discomfort every now and then. BMs have been inconsistent, more on constipated side.

## 2018-06-18 NOTE — PHYSICAL EXAM
[No Acute Distress] : no acute distress [Well Nourished] : well nourished [No Respiratory Distress] : no respiratory distress  [Clear to Auscultation] : lungs were clear to auscultation bilaterally [Normal Rate] : normal rate  [Regular Rhythm] : with a regular rhythm [Normal S1, S2] : normal S1 and S2 [No Edema] : there was no peripheral edema [Soft] : abdomen soft [Normal Bowel Sounds] : normal bowel sounds [Normal Affect] : the affect was normal [Normal Insight/Judgement] : insight and judgment were intact [de-identified] : systolic murmur, grade III [de-identified] : PEG tube in place, sero-sanguinous drainage from insertion side. No pain, no erythema, no signs of infection. [de-identified] : sitting in wheelchair

## 2018-06-22 ENCOUNTER — APPOINTMENT (OUTPATIENT)
Dept: GASTROENTEROLOGY | Facility: HOSPITAL | Age: 83
End: 2018-06-22

## 2018-07-22 PROBLEM — Z86.73 HISTORY OF STROKE: Status: RESOLVED | Noted: 2017-03-22 | Resolved: 2018-07-22

## 2018-07-24 ENCOUNTER — APPOINTMENT (OUTPATIENT)
Dept: GASTROENTEROLOGY | Facility: CLINIC | Age: 83
End: 2018-07-24

## 2018-09-04 NOTE — ED ADULT TRIAGE NOTE - PAIN: PRESENCE, MLM
I have reviewed the results which are normal   Please call patient and notify her of normal results  Thank you  denies pain/discomfort

## 2018-12-28 NOTE — ED PROVIDER NOTE - NSCAREINITIATED _GEN_ER
CHIEF COMPLAINT    Chief Complaint   Patient presents with   • Follow-up     3 month f/u - a1c     Health Maintenance Due   Topic Date Due   • Osteoporosis Screening  03/31/2013   • Medicare Wellness 65+  03/31/2013   • Shingles Vaccine (2 of 3) 02/06/2017   • Diabetes A1C  12/25/2018        History of present illness    Lisa Whitehead  is a 70 year old female is being seen at [facility] today for DM. She started Trulicity 10/23/2018. Her daughter is giving her the injections.  She limits tortillas to 2 or 3 a day. She has changed from white to whole bread and avoids soda. She walks sometimes for exercise but does not exercise regularly.  She feels her urine is dark and smells bad. No pain with urination. Bowel movements are normal.  Lisa Whitehead reports a 15 day history of increased GERD. She feels the omeprazole 20 mg QD is not working for the past 15 days.     Recent PHQ 2 Score:  PHQ 2:      Alcohol Use: No              Drug Use:    Yesi Gonzalez reports that she quit smoking about 21 years ago. Her smoking use included cigarettes. She has a 3.00 pack-year smoking history. she has never used smokeless tobacco.   Past Medical History:   Diagnosis Date   • Anxiety disorder    • CKD (chronic kidney disease)    • Depression    • HTN (hypertension)    • Hypertriglyceridemia    • Insomnia    • Proteinuria    • Type II or unspecified type diabetes mellitus without mention of complication, not stated as uncontrolled      Past Surgical History:   Procedure Laterality Date   • Cholecystectomy     • Ercp  4/15/15    with sphincterotomy   • Occult blood test tube  09/02/2010     Patient Active Problem List   Diagnosis   • Diabetes mellitus, type 2 (CMS/[facility])   • Hyperlipidemia   • Anxiety disorder   • CKD (chronic kidney disease)   • GERD (gastroesophageal reflux disease)   • History of depression   • Hypertension goal BP (blood pressure) < 130/80   • History of small bowel  obstruction   • Screening for breast cancer      Review of systems: Lisa Whitehead denies chest pain, diaphoresis, shortness of breath and fever. Pertinent positive and negatives are listed in the HPI.    I have reviewed the problem list, past medical, family, and social history sections including the medications and allergies listed in the medical record as well as the nursing notes.     Physical Exam    Visit Vitals  /62 (BP Location: [facility], Patient Position: Sitting, Cuff Size: Regular)   Pulse 66   Ht 4' 9\" (1.448 m)   Wt 62.4 kg   SpO2 97%   BMI 29.78 kg/m²      General: cooperative, no acute distress.   HEENT:  Sclera anicteric.  Face symmetrical.   Heart:  Regular rate.   Lungs:  Normal respiratory effort.   Abdomen: Deferred.   Genital:  Deferred.   Extremities:   Gait normal.  No clubbing, cyanosis, or edema in the upper extremitities.   Skin:  No rashes, lesions.   Psychiatric:  Cooperative, Appropriate mood & affect.   Hemoglobin A1C POC   Date Value Ref Range Status   12/28/2018 7.6 (A) 4.5 - 5.6 % Final     CHOLESTEROL (no units)   Date Value   03/15/2018 159     HDL (no units)   Date Value   03/15/2018 50     TRIGLYCERIDE (no units)   Date Value   03/15/2018 130     CALCULATED LDL (mg/dL)   Date Value   10/13/2014 75      Assessment & Plan    (E11.9) Type 2 diabetes mellitus without complication, without long-term current use of insulin (CMS/[facility])  (primary encounter diagnosis)  Comment: Improved with Trulicity, but she may be having secondary effects of the medication.  Plan: GLYCOHEMOGLOBIN, URINALYSIS DIPSTICK ONLY        We discussed possibly increasing omeprazole or holding Trulicity. She prefers to hold the Trulicity and focus on lifestyle to better control her diabetes. Return in 3 months for reevaluation.    (I10) Hypertension goal BP (blood pressure) < 130/80  Plan: Continue losartan.    (E66.3) Overweight (BMI 25.0-29.9)  Plan: Moderate weight loss, limiting carbohydrates and walking  Kalyn Raines(Attending) regularly.     (K21.9) Gastroesophageal reflux disease without esophagitis  Comment: We discussed increasing omeprazole to 1 tablet twice daily. She prefers to stop the Trulicity and observe to see if GERD improves.   Plan: Omeprazole daily.     Relevant data were reviewed with Lisa. A healthy lifestyle was encouraged. Lisa has no further questions today. Lisa Whitehead indicated understanding of the diagnosis and agreed with the plan of care.

## 2019-01-26 NOTE — DIETITIAN INITIAL EVALUATION ADULT. - DIET TYPE
AS PER HPI, otherwise:  Constitutional: no fever, no headache, no lightheadedness, no dizziness  Eyes: no conjunctivitis, no vision changes  Ears: no ear pain   Nose: no nasal congestion, Mouth/Throat: no throat pain, Neck: no stiffness  Cardiovascular: no chest pain, no palpitations  Chest: no cough, no shortness of breath  Gastrointestinal: no abdominal pain, no vomiting and diarrhea  MSK: no joint pain, no swelling of extremities  : no dysuria  Skin: no rash  Neuro: no LOC, no focal weakness, no sensory changes pureed/enteral feed

## 2019-06-17 NOTE — DIETITIAN INITIAL EVALUATION ADULT. - NS AS NUTRI INTERV VITAMIN
How Severe Is It?: mild Is This A New Presentation, Or A Follow-Up?: Follow Up Vitiligo recommend nephrovite

## 2020-06-18 NOTE — PROGRESS NOTE ADULT - ASSESSMENT
82 yo lady w/ hx of ESRD, LVOT obstruction/LVH   Presents for sx's c/w URI and atypical chest pain, likely MSK.    Elevated Trops likely due to fluid retention from missed HD treatment in the setting of ESRD.  Unlikely ACS.     REC:    - admit to telemetry   - monitor Brandon; once flat (trop T); stop checking   - cont rest of meds  - supportive care for URI   - obtain TTE to re-evaluate Aortic valve area, LVOT     Gunnar Toro MD  [facility] fellow   [id number] 83F w/ history ESRD, LVOT obstruction/LVH p/w symptoms of URI, atypical chest pain, elevated cardiac biomarkers likely due to fluid retention from missed HD treatment in the setting of ESRD and course complicated by hypotension, bradycardia and syncope requiring transition to MICU    #Syncope - after reviewing telemetry strip around the time of the event. The P-P interval lengthened beat to beat around the event which is consistent with vagal syncope. There is report that she may of had a BM around that time which lines up with the story. In the setting of midodrine this may have been exacerbated by the medication. Unfortunately, she needs to keep the midodrine for dialysis purposes.  - continue monitoring clinically with fall precautions  - discussed with son at bedside    #LVOT obstruction with TRICE - concerning in the setting of syncope as valsalva maneuvers classically exacerbates the obstruction and can also makes the situation difficult to manage  - after goes through dialysis today and demonstrates stability for the next day or so I would consider starting a negative iontrope to help with the above issue    #Aortic stenosis - on exam she has a mid to late peaking murmur that is difficult to characterize because of the LVOT obstruction. It probably is not severe or critical at this time and we should hold off any further invasive testing for now. 83F w/ history ESRD, LVOT obstruction/LVH p/w symptoms of URI, atypical chest pain, elevated cardiac biomarkers likely due to fluid retention from missed HD treatment in the setting of ESRD.  Course complicated by hypotension, bradycardia and syncope requiring transition to MICU  Echo has demonstrated at least moderate A.S. as well as mod to severe LV outflow tract obstruction; LV function preserved (hyperdynamic).    #Syncope - after reviewing telemetry strip around the time of the event. The P-P interval lengthened beat to beat around the event which is consistent with vagal syncope. There is report that she may of had a BM around that time which lines up with the story. In the setting of midodrine this may have been exacerbated by the medication. Unfortunately, she needs to keep the midodrine for dialysis purposes.  - continue monitoring clinically with fall precautions  - discussed with son at bedside    #LVOT obstruction with TRICE - concerning in the setting of syncope as valsalva maneuvers classically exacerbate the obstruction and can also makes the situation difficult to manage  - after goes through dialysis today and demonstrates stability for the next day or so I would consider starting a negative inotrope (eg. beta blocker) to help with the above issue    #Aortic stenosis - on exam she has a mid to late peaking murmur that is difficult to characterize because of the LVOT obstruction. It probably is not severe or critical at this time and we should hold off any further invasive testing for now.    Dr. Giacomo M.D.  [id number]    Patient seen and examined; discussed with cardiology fellow. Hx., exam and labs as above.  I agree with the assessment and recommendations.   Joel Whitten M.D.  Cardiology Consult Service  [phone] or [phone] Banner Transposition Flap Text: The defect edges were debeveled with a #15 scalpel blade.  Given the location of the defect and the proximity to free margins a Banner transposition flap was deemed most appropriate.  Using a sterile surgical marker, an appropriate flap drawn around the defect. The area thus outlined was incised deep to adipose tissue with a #15 scalpel blade.  The skin margins were undermined to an appropriate distance in all directions utilizing iris scissors.

## 2020-11-03 DIAGNOSIS — K94.23 GASTROSTOMY MALFUNCTION: ICD-10-CM

## 2021-02-24 ENCOUNTER — EMERGENCY (EMERGENCY)
Facility: HOSPITAL | Age: 86
LOS: 1 days | Discharge: HOME | End: 2021-02-24
Attending: STUDENT IN AN ORGANIZED HEALTH CARE EDUCATION/TRAINING PROGRAM | Admitting: STUDENT IN AN ORGANIZED HEALTH CARE EDUCATION/TRAINING PROGRAM
Payer: MEDICARE

## 2021-02-24 VITALS
OXYGEN SATURATION: 100 % | HEART RATE: 72 BPM | DIASTOLIC BLOOD PRESSURE: 74 MMHG | RESPIRATION RATE: 18 BRPM | TEMPERATURE: 98 F | SYSTOLIC BLOOD PRESSURE: 155 MMHG | HEIGHT: 64 IN

## 2021-02-24 DIAGNOSIS — Z99.2 DEPENDENCE ON RENAL DIALYSIS: Chronic | ICD-10-CM

## 2021-02-24 DIAGNOSIS — Z93.1 GASTROSTOMY STATUS: Chronic | ICD-10-CM

## 2021-02-24 DIAGNOSIS — Z95.828 PRESENCE OF OTHER VASCULAR IMPLANTS AND GRAFTS: Chronic | ICD-10-CM

## 2021-02-24 DIAGNOSIS — Z98.49 CATARACT EXTRACTION STATUS, UNSPECIFIED EYE: Chronic | ICD-10-CM

## 2021-02-24 PROCEDURE — 43762 RPLC GTUBE NO REVJ TRC: CPT | Mod: GC

## 2021-02-24 PROCEDURE — 99284 EMERGENCY DEPT VISIT MOD MDM: CPT | Mod: 25,GC

## 2021-02-24 NOTE — ED ADULT TRIAGE NOTE - CHIEF COMPLAINT QUOTE
Pt arrives from home via EMS reporting feeding tube dislodged.  Pt denies other complaints.  Pt is legally blind.  Family phone number Aimee, ([phone]).

## 2021-02-25 VITALS
RESPIRATION RATE: 17 BRPM | OXYGEN SATURATION: 100 % | HEART RATE: 76 BPM | TEMPERATURE: 98 F | DIASTOLIC BLOOD PRESSURE: 71 MMHG | SYSTOLIC BLOOD PRESSURE: 153 MMHG

## 2021-02-25 PROBLEM — I95.9 HYPOTENSION, UNSPECIFIED: Chronic | Status: ACTIVE | Noted: 2017-06-19

## 2021-02-25 LAB
ALBUMIN SERPL ELPH-MCNC: 4.4 G/DL — SIGNIFICANT CHANGE UP (ref 3.3–5)
ALP SERPL-CCNC: 218 U/L — HIGH (ref 40–120)
ALT FLD-CCNC: 23 U/L — SIGNIFICANT CHANGE UP (ref 4–33)
ANION GAP SERPL CALC-SCNC: 23 MMOL/L — HIGH (ref 7–14)
AST SERPL-CCNC: 32 U/L — SIGNIFICANT CHANGE UP (ref 4–32)
BILIRUB SERPL-MCNC: 0.3 MG/DL — SIGNIFICANT CHANGE UP (ref 0.2–1.2)
BUN SERPL-MCNC: 117 MG/DL — HIGH (ref 7–23)
CALCIUM SERPL-MCNC: 10.3 MG/DL — SIGNIFICANT CHANGE UP (ref 8.4–10.5)
CHLORIDE SERPL-SCNC: 95 MMOL/L — LOW (ref 98–107)
CO2 SERPL-SCNC: 20 MMOL/L — LOW (ref 22–31)
CREAT SERPL-MCNC: 9.23 MG/DL — HIGH (ref 0.5–1.3)
GLUCOSE SERPL-MCNC: 88 MG/DL — SIGNIFICANT CHANGE UP (ref 70–99)
HCT VFR BLD CALC: 35.2 % — SIGNIFICANT CHANGE UP (ref 34.5–45)
HGB BLD-MCNC: 11.5 G/DL — SIGNIFICANT CHANGE UP (ref 11.5–15.5)
MCHC RBC-ENTMCNC: 29.6 PG — SIGNIFICANT CHANGE UP (ref 27–34)
MCHC RBC-ENTMCNC: 32.7 GM/DL — SIGNIFICANT CHANGE UP (ref 32–36)
MCV RBC AUTO: 90.7 FL — SIGNIFICANT CHANGE UP (ref 80–100)
NRBC # BLD: 0 /100 WBCS — SIGNIFICANT CHANGE UP
NRBC # FLD: 0 K/UL — SIGNIFICANT CHANGE UP
PLATELET # BLD AUTO: 153 K/UL — SIGNIFICANT CHANGE UP (ref 150–400)
POTASSIUM SERPL-MCNC: 4.6 MMOL/L — SIGNIFICANT CHANGE UP (ref 3.5–5.3)
POTASSIUM SERPL-SCNC: 4.6 MMOL/L — SIGNIFICANT CHANGE UP (ref 3.5–5.3)
PROT SERPL-MCNC: 7.7 G/DL — SIGNIFICANT CHANGE UP (ref 6–8.3)
RBC # BLD: 3.88 M/UL — SIGNIFICANT CHANGE UP (ref 3.8–5.2)
RBC # FLD: 15.1 % — HIGH (ref 10.3–14.5)
SODIUM SERPL-SCNC: 138 MMOL/L — SIGNIFICANT CHANGE UP (ref 135–145)
WBC # BLD: 6.88 K/UL — SIGNIFICANT CHANGE UP (ref 3.8–10.5)
WBC # FLD AUTO: 6.88 K/UL — SIGNIFICANT CHANGE UP (ref 3.8–10.5)

## 2021-02-25 PROCEDURE — 74019 RADEX ABDOMEN 2 VIEWS: CPT | Mod: 26

## 2021-02-25 PROCEDURE — 74177 CT ABD & PELVIS W/CONTRAST: CPT | Mod: 26

## 2021-02-25 NOTE — ED ADULT NURSE NOTE - CHIEF COMPLAINT QUOTE
Pt arrives from home via EMS reporting feeding tube dislodged.  Pt denies other complaints.  Pt is legally blind.  Family phone number Aimee, ([phone]).

## 2021-02-25 NOTE — ED PROVIDER NOTE - PATIENT PORTAL LINK FT
You can access the FollowMyHealth Patient Portal offered by [facility] by registering at the following website: [email or web address]. By joining [facility]’s FollowMyHealth portal, you will also be able to view your health information using other applications (apps) compatible with our system. You can access the FollowMyHealth Patient Portal offered by [facility] by registering at the following website: [email or web address]. By joining [employer]’s FollowMyHealth portal, you will also be able to view your health information using other applications (apps) compatible with our system.

## 2021-02-25 NOTE — ED PROVIDER NOTE - OBJECTIVE STATEMENT
Pt is an 85yo F w/ Pt is an 85yo F w/ PMH of HLD, CVA c/b dysphagia s/p PEG, grade 1 DHF, LVOT obstruction, GERD, ESRD (T/Th/Sat via left IJ tunneled dialysis cath), left arm vascular stent p/w PEG tube dislodgment. You presented to [facility] yesterday and had tube replaced. Today you noted G tube fell out after feeds and thus presented to hospital. Deny any CP, SOB, Abd pain, constipation or diarrhea.

## 2021-02-25 NOTE — ED PROVIDER NOTE - CLINICAL SUMMARY MEDICAL DECISION MAKING FREE TEXT BOX
Pt is an 85yo F w/ PMH of HLD, CVA c/b dysphagia s/p PEG, grade 1 DHF, LVOT obstruction, GERD, ESRD (T/Th/Sat via left IJ tunneled dialysis cath), left arm vascular stent p/w PEG tube dislodgment. Will replace PEG tube

## 2021-02-25 NOTE — ED PROVIDER NOTE - PROGRESS NOTE DETAILS
Abdominal xray after PEG exchange w/ contrast in the large bowel. Unclear if in setting of prior contrast study from yesterday vs fistula from stomach to large bowel. US guided IV placed and pt pending blood work to undergo CT A/P w/ contrast to r/o fistula Pt dialysis at [facility] at 09:45 and follows w/ Dr. Rudd. Would need dialysis after CT scan if can be done prior to the fact. Otherwise may need admission for dialysis if cannot make appointment El, PGY-2: Spoke with nephrology Tobin, who states patient dialysis can be reschedule for today at 320pm, must also call son to reschedule transportation to dialysis center.

## 2021-02-25 NOTE — ED PROVIDER NOTE - PHYSICAL EXAMINATION
VITALS:   T(C): 36.6 (02-24-21 @ 20:55), Max: 36.6 (02-24-21 @ 20:55)  HR: 72 (02-24-21 @ 20:55) (72 - 72)  BP: 155/74 (02-24-21 @ 20:55) (155/74 - 155/74)  RR: 18 (02-24-21 @ 20:55) (18 - 18)  SpO2: 100% (02-24-21 @ 20:55) (100% - 100%)    GENERAL: NAD, lying in bed comfortably  HEAD:  Atraumatic, Normocephalic  EYES: EOMI, PERRLA, conjunctiva and sclera clear  ENT: Moist mucous membranes  NECK: Supple, No JVD  CHEST/LUNG: CTABL; No rales, rhonchi, wheezing, or rubs. Unlabored respirations  HEART: RRR. No M/R/G  ABDOMEN: Soft, nontender, non-distended, normoactive BS. No hepatomegaly. PEG Tube easily dislodged from track  EXTREMITIES:  2+ Peripheral Pulses, brisk capillary refill. No clubbing, cyanosis, or edema  NERVOUS SYSTEM:  Alert & Oriented X3, speech clear. No deficits, CN II-XII intact. Normal sensation   PSYCH: Normal affect, normal speech, normal behavior  SKIN: No rashes or lesions

## 2021-02-25 NOTE — ED ADULT NURSE NOTE - NSIMPLEMENTINTERV_GEN_ALL_ED
Implemented All Fall with Harm Risk Interventions:  [place] to call system. Call bell, personal items and telephone within reach. Instruct patient to call for assistance. Room bathroom lighting operational. Non-slip footwear when patient is off stretcher. Physically safe environment: no spills, clutter or unnecessary equipment. Stretcher in lowest position, wheels locked, appropriate side rails in place. Provide visual cue, wrist band, yellow gown, etc. Monitor gait and stability. Monitor for mental status changes and reorient to person, place, and time. Review medications for side effects contributing to fall risk. Reinforce activity limits and safety measures with patient and family. Provide visual clues: red socks.

## 2021-02-25 NOTE — ED PROVIDER NOTE - NS ED ROS FT
REVIEW OF SYSTEMS:    CONSTITUTIONAL: No weakness, fevers or chills  EYES: No vision changes  ENT: No vertigo or throat pain   NECK: No pain or stiffness  RESPIRATORY: No cough, wheezing, hemoptysis; No shortness of breath  CARDIOVASCULAR: No chest pain or palpitations  GASTROINTESTINAL: No abdominal or epigastric pain. No nausea, vomiting, or hematemesis; No diarrhea or constipation. No melena or hematochezia.  GENITOURINARY: No dysuria, frequency or hematuria  NEUROLOGICAL: No numbness or weakness  PSYCH: No anxiety, depression, or behavior changes  All other review of systems is negative unless indicated above.

## 2021-02-25 NOTE — ED ADULT NURSE NOTE - OBJECTIVE STATEMENT
Pt presents to room 8, alert&orientedx4, ambulatory with walker at baseline. coming from home by EMS, PMHX Renal failure (diaylsis T,TH,SAT, last completed Tuesday) coming to ED for dislodged peg-tube. Pt states "it feels struck".  Peg-tube site intact, no bleeding or oozing observed. Pt also has dialysis catheter noted to left chest wall, skin intact. Pt denies any n/v/d, fevers, cough , chest pain or SOB. MD at bedside for peg-tube replacement. Pt also legally blind, fall precautions in placed. Will continue to monitor

## 2021-02-25 NOTE — ED PROVIDER NOTE - ATTENDING CONTRIBUTION TO CARE
86F with pmh HLD, CVA, GERD, ESRD, chronic PEG tube presenting with PEG tube displacement. States had tube replaced yesterday however it fell out today around 7pm. States she inserted it back herself immediately but thinks the balloon was deflated. Denies any fever, chills, cp, sob, nausea, vomiting, diarrhea, abd pain    GEN: NAD, awake, well appearing  HEENT: NCAT, MMM, normal conjunctiva, perrl  CHEST/LUNGS: Non-tachypneic, CTAB, bilateral breath sounds  CARDIAC: Non-tachycardic, s1s2, normal perfusion, no peripheral edema  ABDOMEN: Soft, NTND, No rebound/guarding, PEG tube site c/d/i  MSK: No joint tenderness, no gross deformity of extremities  SKIN: No rashes, no petechiae, no vesicles  NEURO: CN grossly intact, normal coordination, no focal motor or sensory deficits  PSYCH: Alert, appropriate, cooperative     Patient presenting with PEG tube displacement. Site c/d/i. abdomen soft, non tender. Will replace peg and obtain confirmatory XR

## 2021-08-20 NOTE — PATIENT PROFILE ADULT. - MEDICATION HERBAL REMEDIES, PROFILE
Detail Level: Simple Additional Notes: Patient consent was obtained to proceed with the visit and recommended plan of care after discussion of all risks and benefits, including the risks of COVID-19 exposure. no

## 2022-01-01 ENCOUNTER — EMERGENCY (EMERGENCY)
Facility: HOSPITAL | Age: 87
LOS: 0 days | Discharge: HOME | End: 2022-12-08
Attending: EMERGENCY MEDICINE
Payer: MEDICARE

## 2022-01-01 ENCOUNTER — INPATIENT (INPATIENT)
Facility: HOSPITAL | Age: 87
LOS: 1 days | End: 2022-12-15
Attending: INTERNAL MEDICINE | Admitting: INTERNAL MEDICINE

## 2022-01-01 VITALS
OXYGEN SATURATION: 96 % | RESPIRATION RATE: 20 BRPM | SYSTOLIC BLOOD PRESSURE: 94 MMHG | TEMPERATURE: 99 F | HEART RATE: 59 BPM | DIASTOLIC BLOOD PRESSURE: 60 MMHG | WEIGHT: 87.08 LBS | HEIGHT: 60 IN

## 2022-01-01 VITALS — HEIGHT: 60 IN | HEART RATE: 73 BPM | WEIGHT: 87.08 LBS | RESPIRATION RATE: 20 BRPM

## 2022-01-01 VITALS — DIASTOLIC BLOOD PRESSURE: 72 MMHG | HEART RATE: 83 BPM | RESPIRATION RATE: 18 BRPM | SYSTOLIC BLOOD PRESSURE: 96 MMHG

## 2022-01-01 VITALS
TEMPERATURE: 98 F | DIASTOLIC BLOOD PRESSURE: 57 MMHG | HEART RATE: 68 BPM | OXYGEN SATURATION: 98 % | RESPIRATION RATE: 16 BRPM | SYSTOLIC BLOOD PRESSURE: 93 MMHG

## 2022-01-01 DIAGNOSIS — Z88.0 ALLERGY STATUS TO PENICILLIN: ICD-10-CM

## 2022-01-01 DIAGNOSIS — R10.32 LEFT LOWER QUADRANT PAIN: ICD-10-CM

## 2022-01-01 DIAGNOSIS — Z95.828 PRESENCE OF OTHER VASCULAR IMPLANTS AND GRAFTS: Chronic | ICD-10-CM

## 2022-01-01 DIAGNOSIS — Z79.82 LONG TERM (CURRENT) USE OF ASPIRIN: ICD-10-CM

## 2022-01-01 DIAGNOSIS — N18.6 END STAGE RENAL DISEASE: ICD-10-CM

## 2022-01-01 DIAGNOSIS — Z99.2 DEPENDENCE ON RENAL DIALYSIS: Chronic | ICD-10-CM

## 2022-01-01 DIAGNOSIS — Z20.822 CONTACT WITH AND (SUSPECTED) EXPOSURE TO COVID-19: ICD-10-CM

## 2022-01-01 DIAGNOSIS — Z91.018 ALLERGY TO OTHER FOODS: ICD-10-CM

## 2022-01-01 DIAGNOSIS — R10.9 UNSPECIFIED ABDOMINAL PAIN: ICD-10-CM

## 2022-01-01 DIAGNOSIS — R19.7 DIARRHEA, UNSPECIFIED: ICD-10-CM

## 2022-01-01 DIAGNOSIS — Z98.49 CATARACT EXTRACTION STATUS, UNSPECIFIED EYE: Chronic | ICD-10-CM

## 2022-01-01 DIAGNOSIS — Z93.1 GASTROSTOMY STATUS: Chronic | ICD-10-CM

## 2022-01-01 LAB
ALBUMIN SERPL ELPH-MCNC: 3.5 G/DL — SIGNIFICANT CHANGE UP (ref 3.3–5)
ALBUMIN SERPL ELPH-MCNC: 3.7 G/DL — SIGNIFICANT CHANGE UP (ref 3.3–5)
ALBUMIN SERPL ELPH-MCNC: 3.8 G/DL — SIGNIFICANT CHANGE UP (ref 3.3–5)
ALP SERPL-CCNC: 128 U/L — HIGH (ref 40–120)
ALP SERPL-CCNC: 134 U/L — HIGH (ref 40–120)
ALP SERPL-CCNC: 155 U/L — HIGH (ref 40–120)
ALT FLD-CCNC: 130 U/L — HIGH (ref 12–78)
ALT FLD-CCNC: 72 U/L — SIGNIFICANT CHANGE UP (ref 12–78)
ALT FLD-CCNC: 73 U/L — SIGNIFICANT CHANGE UP (ref 12–78)
ANION GAP SERPL CALC-SCNC: 13 MMOL/L — SIGNIFICANT CHANGE UP (ref 5–17)
ANION GAP SERPL CALC-SCNC: 14 MMOL/L — SIGNIFICANT CHANGE UP (ref 5–17)
ANION GAP SERPL CALC-SCNC: 17 MMOL/L — SIGNIFICANT CHANGE UP (ref 5–17)
AST SERPL-CCNC: 45 U/L — HIGH (ref 15–37)
AST SERPL-CCNC: 48 U/L — HIGH (ref 15–37)
AST SERPL-CCNC: 97 U/L — HIGH (ref 15–37)
BASOPHILS # BLD AUTO: 0 K/UL — SIGNIFICANT CHANGE UP (ref 0–0.2)
BASOPHILS # BLD AUTO: 0.02 K/UL — SIGNIFICANT CHANGE UP (ref 0–0.2)
BASOPHILS # BLD AUTO: 0.06 K/UL — SIGNIFICANT CHANGE UP (ref 0–0.2)
BASOPHILS NFR BLD AUTO: 0 % — SIGNIFICANT CHANGE UP (ref 0–2)
BASOPHILS NFR BLD AUTO: 0.2 % — SIGNIFICANT CHANGE UP (ref 0–2)
BASOPHILS NFR BLD AUTO: 0.8 % — SIGNIFICANT CHANGE UP (ref 0–2)
BILIRUB SERPL-MCNC: 0.4 MG/DL — SIGNIFICANT CHANGE UP (ref 0.2–1.2)
BILIRUB SERPL-MCNC: 0.7 MG/DL — SIGNIFICANT CHANGE UP (ref 0.2–1.2)
BILIRUB SERPL-MCNC: 0.7 MG/DL — SIGNIFICANT CHANGE UP (ref 0.2–1.2)
BUN SERPL-MCNC: 130 MG/DL — HIGH (ref 7–23)
BUN SERPL-MCNC: 86 MG/DL — HIGH (ref 7–23)
BUN SERPL-MCNC: 94 MG/DL — HIGH (ref 7–23)
BURR CELLS BLD QL SMEAR: PRESENT — SIGNIFICANT CHANGE UP
CALCIUM SERPL-MCNC: 9.3 MG/DL — SIGNIFICANT CHANGE UP (ref 8.5–10.1)
CALCIUM SERPL-MCNC: 9.3 MG/DL — SIGNIFICANT CHANGE UP (ref 8.5–10.1)
CALCIUM SERPL-MCNC: 9.9 MG/DL — SIGNIFICANT CHANGE UP (ref 8.5–10.1)
CHLORIDE SERPL-SCNC: 100 MMOL/L — SIGNIFICANT CHANGE UP (ref 96–108)
CHLORIDE SERPL-SCNC: 103 MMOL/L — SIGNIFICANT CHANGE UP (ref 96–108)
CHLORIDE SERPL-SCNC: 99 MMOL/L — SIGNIFICANT CHANGE UP (ref 96–108)
CO2 SERPL-SCNC: 16 MMOL/L — LOW (ref 22–31)
CO2 SERPL-SCNC: 22 MMOL/L — SIGNIFICANT CHANGE UP (ref 22–31)
CO2 SERPL-SCNC: 23 MMOL/L — SIGNIFICANT CHANGE UP (ref 22–31)
CREAT SERPL-MCNC: 8.02 MG/DL — HIGH (ref 0.5–1.3)
CREAT SERPL-MCNC: 8.76 MG/DL — HIGH (ref 0.5–1.3)
CREAT SERPL-MCNC: 9.75 MG/DL — HIGH (ref 0.5–1.3)
DACRYOCYTES BLD QL SMEAR: SLIGHT — SIGNIFICANT CHANGE UP
EGFR: 4 ML/MIN/1.73M2 — LOW
EOSINOPHIL # BLD AUTO: 0 K/UL — SIGNIFICANT CHANGE UP (ref 0–0.5)
EOSINOPHIL # BLD AUTO: 0.02 K/UL — SIGNIFICANT CHANGE UP (ref 0–0.5)
EOSINOPHIL # BLD AUTO: 0.13 K/UL — SIGNIFICANT CHANGE UP (ref 0–0.5)
EOSINOPHIL NFR BLD AUTO: 0 % — SIGNIFICANT CHANGE UP (ref 0–6)
EOSINOPHIL NFR BLD AUTO: 0.2 % — SIGNIFICANT CHANGE UP (ref 0–6)
EOSINOPHIL NFR BLD AUTO: 1.7 % — SIGNIFICANT CHANGE UP (ref 0–6)
FLUAV AG NPH QL: SIGNIFICANT CHANGE UP
FLUAV AG NPH QL: SIGNIFICANT CHANGE UP
FLUBV AG NPH QL: SIGNIFICANT CHANGE UP
FLUBV AG NPH QL: SIGNIFICANT CHANGE UP
GLUCOSE BLDC GLUCOMTR-MCNC: 127 MG/DL — HIGH (ref 70–99)
GLUCOSE BLDC GLUCOMTR-MCNC: 35 MG/DL — CRITICAL LOW (ref 70–99)
GLUCOSE BLDC GLUCOMTR-MCNC: 75 MG/DL — SIGNIFICANT CHANGE UP (ref 70–99)
GLUCOSE SERPL-MCNC: 106 MG/DL — HIGH (ref 70–99)
GLUCOSE SERPL-MCNC: 144 MG/DL — HIGH (ref 70–99)
GLUCOSE SERPL-MCNC: 89 MG/DL — SIGNIFICANT CHANGE UP (ref 70–99)
HCT VFR BLD CALC: 39.7 % — SIGNIFICANT CHANGE UP (ref 34.5–45)
HCT VFR BLD CALC: 40.8 % — SIGNIFICANT CHANGE UP (ref 34.5–45)
HCT VFR BLD CALC: 41.4 % — SIGNIFICANT CHANGE UP (ref 34.5–45)
HGB BLD-MCNC: 13.6 G/DL — SIGNIFICANT CHANGE UP (ref 11.5–15.5)
HGB BLD-MCNC: 13.6 G/DL — SIGNIFICANT CHANGE UP (ref 11.5–15.5)
HGB BLD-MCNC: 14.1 G/DL — SIGNIFICANT CHANGE UP (ref 11.5–15.5)
IMM GRANULOCYTES NFR BLD AUTO: 0.4 % — SIGNIFICANT CHANGE UP (ref 0–0.9)
IMM GRANULOCYTES NFR BLD AUTO: 0.6 % — SIGNIFICANT CHANGE UP (ref 0–0.9)
LIDOCAIN IGE QN: 1098 U/L — HIGH (ref 73–393)
LIDOCAIN IGE QN: 469 U/L — HIGH (ref 73–393)
LIDOCAIN IGE QN: 572 U/L — HIGH (ref 73–393)
LYMPHOCYTES # BLD AUTO: 0.09 K/UL — LOW (ref 1–3.3)
LYMPHOCYTES # BLD AUTO: 0.61 K/UL — LOW (ref 1–3.3)
LYMPHOCYTES # BLD AUTO: 0.9 K/UL — LOW (ref 1–3.3)
LYMPHOCYTES # BLD AUTO: 1 % — LOW (ref 13–44)
LYMPHOCYTES # BLD AUTO: 12 % — LOW (ref 13–44)
LYMPHOCYTES # BLD AUTO: 6.2 % — LOW (ref 13–44)
MAGNESIUM SERPL-MCNC: 2.3 MG/DL — SIGNIFICANT CHANGE UP (ref 1.6–2.6)
MAGNESIUM SERPL-MCNC: 2.6 MG/DL — SIGNIFICANT CHANGE UP (ref 1.6–2.6)
MANUAL SMEAR VERIFICATION: SIGNIFICANT CHANGE UP
MCHC RBC-ENTMCNC: 31.1 PG — SIGNIFICANT CHANGE UP (ref 27–34)
MCHC RBC-ENTMCNC: 31.3 PG — SIGNIFICANT CHANGE UP (ref 27–34)
MCHC RBC-ENTMCNC: 31.7 PG — SIGNIFICANT CHANGE UP (ref 27–34)
MCHC RBC-ENTMCNC: 33.3 G/DL — SIGNIFICANT CHANGE UP (ref 32–36)
MCHC RBC-ENTMCNC: 34.1 G/DL — SIGNIFICANT CHANGE UP (ref 32–36)
MCHC RBC-ENTMCNC: 34.3 G/DL — SIGNIFICANT CHANGE UP (ref 32–36)
MCV RBC AUTO: 91.5 FL — SIGNIFICANT CHANGE UP (ref 80–100)
MCV RBC AUTO: 93 FL — SIGNIFICANT CHANGE UP (ref 80–100)
MCV RBC AUTO: 93.2 FL — SIGNIFICANT CHANGE UP (ref 80–100)
MONOCYTES # BLD AUTO: 0.64 K/UL — SIGNIFICANT CHANGE UP (ref 0–0.9)
MONOCYTES # BLD AUTO: 0.94 K/UL — HIGH (ref 0–0.9)
MONOCYTES # BLD AUTO: 1.23 K/UL — HIGH (ref 0–0.9)
MONOCYTES NFR BLD AUTO: 12.4 % — SIGNIFICANT CHANGE UP (ref 2–14)
MONOCYTES NFR BLD AUTO: 12.6 % — SIGNIFICANT CHANGE UP (ref 2–14)
MONOCYTES NFR BLD AUTO: 7 % — SIGNIFICANT CHANGE UP (ref 2–14)
MYELOCYTES NFR BLD: 1 % — HIGH (ref 0–0)
NEUTROPHILS # BLD AUTO: 5.43 K/UL — SIGNIFICANT CHANGE UP (ref 1.8–7.4)
NEUTROPHILS # BLD AUTO: 7.97 K/UL — HIGH (ref 1.8–7.4)
NEUTROPHILS # BLD AUTO: 8.23 K/UL — HIGH (ref 1.8–7.4)
NEUTROPHILS NFR BLD AUTO: 72.5 % — SIGNIFICANT CHANGE UP (ref 43–77)
NEUTROPHILS NFR BLD AUTO: 80.4 % — HIGH (ref 43–77)
NEUTROPHILS NFR BLD AUTO: 89 % — HIGH (ref 43–77)
NEUTS BAND # BLD: 1 % — SIGNIFICANT CHANGE UP (ref 0–8)
NRBC # BLD: 0 /100 WBCS — SIGNIFICANT CHANGE UP (ref 0–0)
NRBC # BLD: 0 /100 WBCS — SIGNIFICANT CHANGE UP (ref 0–0)
NRBC # BLD: 0 /100 — SIGNIFICANT CHANGE UP (ref 0–0)
NRBC # BLD: SIGNIFICANT CHANGE UP /100 WBCS (ref 0–0)
OVALOCYTES BLD QL SMEAR: SLIGHT — SIGNIFICANT CHANGE UP
PLAT MORPH BLD: NORMAL — SIGNIFICANT CHANGE UP
PLATELET # BLD AUTO: 100 K/UL — LOW (ref 150–400)
PLATELET # BLD AUTO: 101 K/UL — LOW (ref 150–400)
PLATELET # BLD AUTO: 105 K/UL — LOW (ref 150–400)
POIKILOCYTOSIS BLD QL AUTO: SLIGHT — SIGNIFICANT CHANGE UP
POTASSIUM SERPL-MCNC: 3.6 MMOL/L — SIGNIFICANT CHANGE UP (ref 3.5–5.3)
POTASSIUM SERPL-MCNC: 3.9 MMOL/L — SIGNIFICANT CHANGE UP (ref 3.5–5.3)
POTASSIUM SERPL-MCNC: 4 MMOL/L — SIGNIFICANT CHANGE UP (ref 3.5–5.3)
POTASSIUM SERPL-SCNC: 3.6 MMOL/L — SIGNIFICANT CHANGE UP (ref 3.5–5.3)
POTASSIUM SERPL-SCNC: 3.9 MMOL/L — SIGNIFICANT CHANGE UP (ref 3.5–5.3)
POTASSIUM SERPL-SCNC: 4 MMOL/L — SIGNIFICANT CHANGE UP (ref 3.5–5.3)
PROT SERPL-MCNC: 6.5 GM/DL — SIGNIFICANT CHANGE UP (ref 6–8.3)
PROT SERPL-MCNC: 6.6 GM/DL — SIGNIFICANT CHANGE UP (ref 6–8.3)
PROT SERPL-MCNC: 6.8 GM/DL — SIGNIFICANT CHANGE UP (ref 6–8.3)
RBC # BLD: 4.34 M/UL — SIGNIFICANT CHANGE UP (ref 3.8–5.2)
RBC # BLD: 4.38 M/UL — SIGNIFICANT CHANGE UP (ref 3.8–5.2)
RBC # BLD: 4.45 M/UL — SIGNIFICANT CHANGE UP (ref 3.8–5.2)
RBC # FLD: 15.9 % — HIGH (ref 10.3–14.5)
RBC # FLD: 15.9 % — HIGH (ref 10.3–14.5)
RBC # FLD: 16 % — HIGH (ref 10.3–14.5)
RBC BLD AUTO: ABNORMAL
SARS-COV-2 RNA SPEC QL NAA+PROBE: SIGNIFICANT CHANGE UP
SARS-COV-2 RNA SPEC QL NAA+PROBE: SIGNIFICANT CHANGE UP
SODIUM SERPL-SCNC: 135 MMOL/L — SIGNIFICANT CHANGE UP (ref 135–145)
SODIUM SERPL-SCNC: 136 MMOL/L — SIGNIFICANT CHANGE UP (ref 135–145)
SODIUM SERPL-SCNC: 136 MMOL/L — SIGNIFICANT CHANGE UP (ref 135–145)
STOMATOCYTES BLD QL SMEAR: SLIGHT — SIGNIFICANT CHANGE UP
VARIANT LYMPHS # BLD: 1 % — SIGNIFICANT CHANGE UP (ref 0–6)
WBC # BLD: 7.49 K/UL — SIGNIFICANT CHANGE UP (ref 3.8–10.5)
WBC # BLD: 9.14 K/UL — SIGNIFICANT CHANGE UP (ref 3.8–10.5)
WBC # BLD: 9.91 K/UL — SIGNIFICANT CHANGE UP (ref 3.8–10.5)
WBC # FLD AUTO: 7.49 K/UL — SIGNIFICANT CHANGE UP (ref 3.8–10.5)
WBC # FLD AUTO: 9.14 K/UL — SIGNIFICANT CHANGE UP (ref 3.8–10.5)
WBC # FLD AUTO: 9.91 K/UL — SIGNIFICANT CHANGE UP (ref 3.8–10.5)

## 2022-01-01 PROCEDURE — 99222 1ST HOSP IP/OBS MODERATE 55: CPT

## 2022-01-01 PROCEDURE — 74177 CT ABD & PELVIS W/CONTRAST: CPT | Mod: 26,MA

## 2022-01-01 PROCEDURE — 93010 ELECTROCARDIOGRAM REPORT: CPT

## 2022-01-01 PROCEDURE — 70450 CT HEAD/BRAIN W/O DYE: CPT | Mod: 26,MA

## 2022-01-01 PROCEDURE — 74018 RADEX ABDOMEN 1 VIEW: CPT | Mod: 26

## 2022-01-01 PROCEDURE — 49465 FLUORO EXAM OF G/COLON TUBE: CPT

## 2022-01-01 PROCEDURE — 71045 X-RAY EXAM CHEST 1 VIEW: CPT | Mod: 26

## 2022-01-01 PROCEDURE — 99284 EMERGENCY DEPT VISIT MOD MDM: CPT | Mod: 25

## 2022-01-01 PROCEDURE — 99233 SBSQ HOSP IP/OBS HIGH 50: CPT

## 2022-01-01 PROCEDURE — 74176 CT ABD & PELVIS W/O CONTRAST: CPT | Mod: 26,MA

## 2022-01-01 PROCEDURE — 31500 INSERT EMERGENCY AIRWAY: CPT

## 2022-01-01 PROCEDURE — 99284 EMERGENCY DEPT VISIT MOD MDM: CPT | Mod: FS

## 2022-01-01 PROCEDURE — 43753 TX GASTRO INTUB W/ASP: CPT

## 2022-01-01 RX ORDER — CIPROFLOXACIN LACTATE 400MG/40ML
500 VIAL (ML) INTRAVENOUS ONCE
Refills: 0 | Status: COMPLETED | OUTPATIENT
Start: 2022-01-01 | End: 2022-01-01

## 2022-01-01 RX ORDER — BRIMONIDINE TARTRATE 2 MG/MG
1 SOLUTION/ DROPS OPHTHALMIC
Qty: 0 | Refills: 0 | DISCHARGE

## 2022-01-01 RX ORDER — FAMOTIDINE 10 MG/ML
1 INJECTION INTRAVENOUS
Qty: 0 | Refills: 0 | DISCHARGE

## 2022-01-01 RX ORDER — MIDODRINE HYDROCHLORIDE 2.5 MG/1
5 TABLET ORAL ONCE
Refills: 0 | Status: COMPLETED | OUTPATIENT
Start: 2022-01-01 | End: 2022-01-01

## 2022-01-01 RX ORDER — CHOLECALCIFEROL (VITAMIN D3) 125 MCG
1 CAPSULE ORAL
Qty: 0 | Refills: 0 | DISCHARGE

## 2022-01-01 RX ORDER — SODIUM CHLORIDE 9 MG/ML
1000 INJECTION, SOLUTION INTRAVENOUS
Refills: 0 | Status: DISCONTINUED | OUTPATIENT
Start: 2022-01-01 | End: 2022-01-01

## 2022-01-01 RX ORDER — CIPROFLOXACIN LACTATE 400MG/40ML
2 VIAL (ML) INTRAVENOUS
Qty: 12 | Refills: 0
Start: 2022-01-01 | End: 2022-01-01

## 2022-01-01 RX ORDER — SEVELAMER CARBONATE 2400 MG/1
1600 POWDER, FOR SUSPENSION ORAL THREE TIMES A DAY
Refills: 0 | Status: DISCONTINUED | OUTPATIENT
Start: 2022-01-01 | End: 2022-01-01

## 2022-01-01 RX ORDER — MIDODRINE HYDROCHLORIDE 2.5 MG/1
15 TABLET ORAL
Refills: 0 | Status: DISCONTINUED | OUTPATIENT
Start: 2022-01-01 | End: 2022-01-01

## 2022-01-01 RX ORDER — MIDODRINE HYDROCHLORIDE 2.5 MG/1
5 TABLET ORAL
Refills: 0 | Status: DISCONTINUED | OUTPATIENT
Start: 2022-01-01 | End: 2022-01-01

## 2022-01-01 RX ORDER — SODIUM CHLORIDE 9 MG/ML
500 INJECTION INTRAMUSCULAR; INTRAVENOUS; SUBCUTANEOUS ONCE
Refills: 0 | Status: COMPLETED | OUTPATIENT
Start: 2022-01-01 | End: 2022-01-01

## 2022-01-01 RX ORDER — HEPARIN SODIUM 5000 [USP'U]/ML
5000 INJECTION INTRAVENOUS; SUBCUTANEOUS EVERY 12 HOURS
Refills: 0 | Status: DISCONTINUED | OUTPATIENT
Start: 2022-01-01 | End: 2022-01-01

## 2022-01-01 RX ORDER — ASPIRIN/CALCIUM CARB/MAGNESIUM 324 MG
1 TABLET ORAL
Qty: 0 | Refills: 0 | DISCHARGE

## 2022-01-01 RX ORDER — ERGOCALCIFEROL 1.25 MG/1
0 CAPSULE ORAL
Qty: 0 | Refills: 2 | DISCHARGE

## 2022-01-01 RX ORDER — ONDANSETRON 8 MG/1
4 TABLET, FILM COATED ORAL EVERY 8 HOURS
Refills: 0 | Status: DISCONTINUED | OUTPATIENT
Start: 2022-01-01 | End: 2022-01-01

## 2022-01-01 RX ORDER — FLUTICASONE PROPIONATE 50 MCG
0 SPRAY, SUSPENSION NASAL
Qty: 0 | Refills: 0 | DISCHARGE

## 2022-01-01 RX ORDER — LATANOPROST 0.05 MG/ML
1 SOLUTION/ DROPS OPHTHALMIC; TOPICAL ONCE
Refills: 0 | Status: COMPLETED | OUTPATIENT
Start: 2022-01-01 | End: 2022-01-01

## 2022-01-01 RX ORDER — MIDODRINE HYDROCHLORIDE 2.5 MG/1
1.5 TABLET ORAL
Qty: 0 | Refills: 0 | DISCHARGE

## 2022-01-01 RX ORDER — OMEPRAZOLE 10 MG/1
1 CAPSULE, DELAYED RELEASE ORAL
Qty: 0 | Refills: 0 | DISCHARGE

## 2022-01-01 RX ORDER — MORPHINE SULFATE 50 MG/1
2 CAPSULE, EXTENDED RELEASE ORAL ONCE
Refills: 0 | Status: DISCONTINUED | OUTPATIENT
Start: 2022-01-01 | End: 2022-01-01

## 2022-01-01 RX ORDER — PANTOPRAZOLE SODIUM 20 MG/1
0 TABLET, DELAYED RELEASE ORAL
Qty: 0 | Refills: 3 | DISCHARGE

## 2022-01-01 RX ORDER — MIDODRINE HYDROCHLORIDE 2.5 MG/1
0 TABLET ORAL
Qty: 0 | Refills: 0 | DISCHARGE

## 2022-01-01 RX ORDER — CIPROFLOXACIN LACTATE 400MG/40ML
5 VIAL (ML) INTRAVENOUS
Qty: 35 | Refills: 0
Start: 2022-01-01 | End: 2022-01-01

## 2022-01-01 RX ORDER — MIDODRINE HYDROCHLORIDE 2.5 MG/1
1 TABLET ORAL
Qty: 0 | Refills: 0 | DISCHARGE

## 2022-01-01 RX ORDER — SODIUM CHLORIDE 9 MG/ML
250 INJECTION INTRAMUSCULAR; INTRAVENOUS; SUBCUTANEOUS ONCE
Refills: 0 | Status: COMPLETED | OUTPATIENT
Start: 2022-01-01 | End: 2022-01-01

## 2022-01-01 RX ORDER — BRINZOLAMIDE/BRIMONIDINE TARTRATE 10; 2 MG/ML; MG/ML
0 SUSPENSION/ DROPS OPHTHALMIC
Qty: 0 | Refills: 5 | DISCHARGE

## 2022-01-01 RX ORDER — LATANOPROST 0.05 MG/ML
0 SOLUTION/ DROPS OPHTHALMIC; TOPICAL
Qty: 0 | Refills: 2 | DISCHARGE

## 2022-01-01 RX ORDER — LATANOPROST 0.05 MG/ML
1 SOLUTION/ DROPS OPHTHALMIC; TOPICAL AT BEDTIME
Refills: 0 | Status: DISCONTINUED | OUTPATIENT
Start: 2022-01-01 | End: 2022-01-01

## 2022-01-01 RX ORDER — ACETAMINOPHEN 500 MG
650 TABLET ORAL EVERY 6 HOURS
Refills: 0 | Status: DISCONTINUED | OUTPATIENT
Start: 2022-01-01 | End: 2022-01-01

## 2022-01-01 RX ORDER — DORZOLAMIDE HYDROCHLORIDE TIMOLOL MALEATE 20; 5 MG/ML; MG/ML
1 SOLUTION/ DROPS OPHTHALMIC
Refills: 0 | Status: DISCONTINUED | OUTPATIENT
Start: 2022-01-01 | End: 2022-01-01

## 2022-01-01 RX ORDER — DORZOLAMIDE HYDROCHLORIDE TIMOLOL MALEATE 20; 5 MG/ML; MG/ML
1 SOLUTION/ DROPS OPHTHALMIC
Qty: 0 | Refills: 0 | DISCHARGE

## 2022-01-01 RX ORDER — METRONIDAZOLE 500 MG
10 TABLET ORAL
Qty: 210 | Refills: 0
Start: 2022-01-01 | End: 2022-01-01

## 2022-01-01 RX ORDER — BRIMONIDINE TARTRATE 2 MG/MG
1 SOLUTION/ DROPS OPHTHALMIC THREE TIMES A DAY
Refills: 0 | Status: DISCONTINUED | OUTPATIENT
Start: 2022-01-01 | End: 2022-01-01

## 2022-01-01 RX ORDER — INFLUENZA VIRUS VACCINE 15; 15; 15; 15 UG/.5ML; UG/.5ML; UG/.5ML; UG/.5ML
0.7 SUSPENSION INTRAMUSCULAR ONCE
Refills: 0 | Status: DISCONTINUED | OUTPATIENT
Start: 2022-01-01 | End: 2022-01-01

## 2022-01-01 RX ORDER — METRONIDAZOLE 500 MG
500 TABLET ORAL ONCE
Refills: 0 | Status: COMPLETED | OUTPATIENT
Start: 2022-01-01 | End: 2022-01-01

## 2022-01-01 RX ORDER — CIPROFLOXACIN LACTATE 400MG/40ML
1 VIAL (ML) INTRAVENOUS
Qty: 6 | Refills: 0
Start: 2022-01-01 | End: 2022-01-01

## 2022-01-01 RX ADMIN — LATANOPROST 1 DROP(S): 0.05 SOLUTION/ DROPS OPHTHALMIC; TOPICAL at 22:08

## 2022-01-01 RX ADMIN — MIDODRINE HYDROCHLORIDE 5 MILLIGRAM(S): 2.5 TABLET ORAL at 17:58

## 2022-01-01 RX ADMIN — DORZOLAMIDE HYDROCHLORIDE TIMOLOL MALEATE 1 DROP(S): 20; 5 SOLUTION/ DROPS OPHTHALMIC at 08:47

## 2022-01-01 RX ADMIN — HEPARIN SODIUM 5000 UNIT(S): 5000 INJECTION INTRAVENOUS; SUBCUTANEOUS at 05:56

## 2022-01-01 RX ADMIN — DORZOLAMIDE HYDROCHLORIDE TIMOLOL MALEATE 1 DROP(S): 20; 5 SOLUTION/ DROPS OPHTHALMIC at 18:07

## 2022-01-01 RX ADMIN — Medication 500 MILLIGRAM(S): at 22:30

## 2022-01-01 RX ADMIN — SODIUM CHLORIDE 500 MILLILITER(S): 9 INJECTION INTRAMUSCULAR; INTRAVENOUS; SUBCUTANEOUS at 20:45

## 2022-01-01 RX ADMIN — SODIUM CHLORIDE 250 MILLILITER(S): 9 INJECTION INTRAMUSCULAR; INTRAVENOUS; SUBCUTANEOUS at 16:58

## 2022-01-01 RX ADMIN — SEVELAMER CARBONATE 1600 MILLIGRAM(S): 2400 POWDER, FOR SUSPENSION ORAL at 22:09

## 2022-01-01 RX ADMIN — MIDODRINE HYDROCHLORIDE 5 MILLIGRAM(S): 2.5 TABLET ORAL at 05:56

## 2022-01-01 RX ADMIN — SODIUM CHLORIDE 250 MILLILITER(S): 9 INJECTION INTRAMUSCULAR; INTRAVENOUS; SUBCUTANEOUS at 17:45

## 2022-01-01 RX ADMIN — BRIMONIDINE TARTRATE 1 DROP(S): 2 SOLUTION/ DROPS OPHTHALMIC at 22:08

## 2022-01-01 RX ADMIN — Medication 650 MILLIGRAM(S): at 17:59

## 2022-01-01 RX ADMIN — Medication 500 MILLIGRAM(S): at 22:29

## 2022-01-01 RX ADMIN — BRIMONIDINE TARTRATE 1 DROP(S): 2 SOLUTION/ DROPS OPHTHALMIC at 08:47

## 2022-01-01 NOTE — PATIENT PROFILE ADULT. - MEDICATIONS BROUGHT TO HOSPITAL, PROFILE
Problem: Oral Nutrition ()  Goal: Effective Oral Intake  Outcome: Ongoing, Progressing  Intervention: Promote Effective Oral Intake  Recent Flowsheet Documentation  Taken 2022 1330 by Almita Quinones, RN  Oral Nutrition Promotion (Infant): breastfeeding promoted  Feeding Interventions:   sucking promoted   feeding cues monitored   lips stroked   latch assistance provided  Taken 2022 1000 by Almita Quinones, RN  Feeding Interventions:   sucking promoted   feeding cues monitored     Problem: Temperature Instability ([place])  Goal: Temperature Stability  Intervention: Promote Temperature Stability  Recent Flowsheet Documentation  Taken 2022 1000 by Almita Quinones, LEO  Warming Method:   t-shirt   swaddled   Goal Outcome Evaluation:          Overall Patient Progress: improving     Soledad's VSS under the non-warming warmer, she transitioned to a bassinet at 1400. She is voiding and stooling, applying aquaphor to diaper area with diaper changes & jojoba oil to dry skin x2. She was given a bath, educated mom and dad, temp stable after bath. She continues to work on PO feedings with cues, she went to breast x1 with improvement with the nipple shield. Mom at bedside, comfortable with cares. Will continue to monitor.         no

## 2022-08-10 NOTE — DIETITIAN INITIAL EVALUATION ADULT. - PROBLEM/PLAN-5
DISPLAY PLAN FREE TEXT Nasal mucosa clear.  Mouth with normal mucosa  Throat has no vesicles, no oropharyngeal exudates and uvula is midline.

## 2022-08-22 NOTE — PATIENT PROFILE ADULT. - PAIN CHRONIC, PROFILE
RAYO PETERSON    Patient Age: 18 year old   Refill request by: Pharmacy fax  Refill to be: [id number] - [place], [place] - [address] [facility] [address]     Medication requested to be refilled:   buPROPion (WELLBUTRIN SR) 150 MG 12 hr tablet 90 tablet 0 9/22/2021     Sig - Route: Take 1 tablet by mouth daily. - Oral      No appointment scheduled told to follow up in 3 months    Patient's last appointment with this Provider was 9/22/21      On Call: Dr. Dunaway       WEIGHT AND HEIGHT:   Wt Readings from Last 1 Encounters:   06/06/22 52.7 kg (116 lb 2 oz) (31 %, Z= -0.51)*     * Growth percentiles are based on CDC (Girls, 2-20 Years) data.     Ht Readings from Last 1 Encounters:   06/06/22 5' 7\" (1.702 m) (86 %, Z= 1.08)*     * Growth percentiles are based on CDC (Girls, 2-20 Years) data.     BMI Readings from Last 1 Encounters:   06/06/22 18.19 kg/m² (9 %, Z= -1.36)*     * Growth percentiles are based on CDC (Girls, 2-20 Years) data.       ALLERGIES:  Cat dander, Dog dander, Seasonal, and Trees  Current Outpatient Medications   Medication Sig Dispense Refill   • norethindrone-ethinyl estradiol-FE (Blisovi FE 1/20) 1-20 MG-MCG per tablet Take 1 tablet by mouth daily. 84 tablet 3   • buPROPion (WELLBUTRIN SR) 150 MG 12 hr tablet Take 1 tablet by mouth daily. 90 tablet 0   • Cetirizine HCl (ZYRTEC ALLERGY PO)      • albuterol (PROAIR RESPICLICK) 108 (90 Base) MCG/ACT inhaler ProAir RespiClick 90 mcg/actuation breath activated     • Fluticasone Propionate (FLONASE NA)        No current facility-administered medications for this visit.       ROUTING: Patient's physician/staff        PCP: Charlene Dove MD         [facility] / Plan: NETWORK/NETWORK OPEN ACCESS / Product Type: POS MISC   PATIENT ADDRESS:  [address] [place] [address] [address] [place] [address]     no

## 2022-11-16 NOTE — ED ADULT NURSE NOTE - CHIEF COMPLAINT QUOTE
MALIKA Collazo M Gi Nurse [facility]  Labs continue to improve without drinking alcohol. Keep his follow up appt with me in Jan and EGD in Dec     Left VM to call back.     
The patient called back. Results relayed to the patient. He does not have any questions.     The patient was also informed his EGD and Colonoscopy in scheduled on 12/8/22 with Dr. Broderick. COVID test scheduled on 12/5/2022.     He also has a follow up appointment with Christina DALY on 1/19/2023  
Pt awake and alert c/o abdominal pain  since october but states now it is worse. Pt has gerds. Pt with PEG tube Pt had dialysis saturday. Pt had episode of vomiting yesterday.

## 2022-12-07 NOTE — ED PROVIDER NOTE - NSFOLLOWUPINSTRUCTIONS_ED_ALL_ED_FT
Follow up with your regular doctor.     Take the CIPROFLOXACIN and METRONIDAZOLE to help with diarrhea.

## 2022-12-07 NOTE — ED PROVIDER NOTE - CLINICAL SUMMARY MEDICAL DECISION MAKING FREE TEXT BOX
abd pain. will replace feeding tube. abd pain. will replace feeding tube. diarrhea. no sign of sever colitis but will start cipro flagyl as no other etiology identified.   mildly hypotensive. gentle fluids. has hd tomorrow. ok for dc home.  son at bedside and knows fu plan
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
## 2022-12-07 NOTE — ED ADULT NURSE NOTE - ED STAT RN HANDOFF DETAILS
Report received from saray ortega. Safety checks compld this shift/Safety rounds completed hourly.  IV sites checked Q2+remains WDL. Meds given as ord with no s/s of adverse RXNs. Fall +skin precs in place. Pt resting comfortably, no distress noted, resp even and unlabored, pt updated about the plan of care, pt states no pain, no discomfort and does not appear to be in any distress. pending lab results and CT scan

## 2022-12-07 NOTE — ED ADULT NURSE NOTE - OBJECTIVE STATEMENT
PT presented to ER, accompanied by son, with complaints of diarrhea x2 weeks. Pt denies nausea, vomiting. No relief with otc meds. dialysis T/TH/S

## 2022-12-07 NOTE — ED ADULT NURSE NOTE - NS ED NURSE AMBULANCES2
Calcipotriene Counseling:  I discussed with the patient the risks of calcipotriene including but not limited to erythema, scaling, itching, and irritation. Ambulnz

## 2022-12-07 NOTE — ED ADULT TRIAGE NOTE - CHIEF COMPLAINT QUOTE
Diarrhea x 2 weeks, no relief with Pepto-Bismol, GT subclavian shunt dialysis shunt Tues, thurs  & Sat, missed yesterday dialysis

## 2022-12-07 NOTE — ED PROVIDER NOTE - OBJECTIVE STATEMENT
88F hx esrd on tts and with feeding tube pw abd pain and loose stools for week. no fever. no vomiting. pain in the left lower abd where there is a feeding tube  ros neg for ha, vision loss, rhinorrhea, cp ,sob, numbness, rash, bleeding, fatigue, vaginal bleeding. arrives with ambo and son

## 2022-12-07 NOTE — ED PROVIDER NOTE - PHYSICAL EXAMINATION
Gen: Alert, NAD  Head: NC, AT   Eyes: PERRL, EOMI, normal lids/conjunctiva  ENT: normal hearing, patent oropharynx without erythema/exudate, uvula midline  Neck: supple, no tenderness, Trachea midline  Pulm: Bilateral BS, normal resp effort, no wheeze/stridor/retractions  CV: RRR, no M/R/G, 2+ radial and dp pulses bl, no edema  Abd: soft, NT/ND, +BS, no hepatosplenomegaly. 18fr peg  Mskel: extremities x4 with normal ROM and no joint effusions. no ctl spine ttp.   Skin: no rash, no bruising   Neuro: AAOx3, no sensory/motor deficits, CN 2-12 intact

## 2022-12-07 NOTE — ED PROVIDER NOTE - PATIENT PORTAL LINK FT
You can access the FollowMyHealth Patient Portal offered by [facility] by registering at the following website: [email or web address]. By joining [facility] [employer]’s FollowMyHealth portal, you will also be able to view your health information using other applications (apps) compatible with our system.

## 2022-12-10 NOTE — CHART NOTE - NSCHARTNOTEFT_GEN_A_CORE
Pt's daughter called staying liquid cipro and metronidazole prescribed on 12/7 by Dr. Cooper is not covered by pt's ins.  Requesting change to tab/pill form that is covered.  Rx needs to be crushed or able to be dissolved to be administered via pt's PEG tube.  S/w pharmacy in house - Cirpo can be crushed and disolved in 20-60 mL water and given via PEG. No milk, yogurt, dairy, or citric acids like orange juice to be given 2 hr prior or 6 hours post cipro admin via PEG.  Clinda can be given instead of flaygl.  Capsules can be pulled apart and contents dissolved in 150 ml water and given via peg.  Peg care as always flush prior and after admin.  Pt's daughter informed of all instructions and precautions.  She verbalized understanding and updated prescriptions sent to pharmacy.

## 2022-12-13 NOTE — ED PROVIDER NOTE - CLINICAL SUMMARY MEDICAL DECISION MAKING FREE TEXT BOX
88y Female with PMHx of hypotension, ESRD on dialysis T/Th/Sat 3 hour sessions (last session today, received 1 hour and 25 mins), CVA with paralysis (unsure which side, walks with walker at baseline, unable to swallow, has feeding tube) presents to the ER for hypotension. Hypotensive at dialysis today, reporting generalized weakness and generalized abdominal pain. Son reports patient was being treated for diarrhea, treated with Cleocin and Ciprofloxacin. Denies fever, chills, chest pain, SOB, urinary complaints, vomiting. Vital signs stable, diffuse abdominal tenderness, no focal neuro deficit. Concern for dehydration given dialysis and diarrhea. Low suspicion for acute abdominal pathology such as diverticulitis or colitis, possible UTI, will r/o UTI. Will check labs, IVF, EKG, CT, reassess. Dispo pending results.

## 2022-12-13 NOTE — H&P ADULT - HISTORY OF PRESENT ILLNESS
88y Female with PMHx of hypotension, ESRD on dialysis T/Th/Sat 3 hour sessions (last session today, received 1 hour and 25 mins), CVA with paralysis (unsure which side, walks with walker at baseline, unable to swallow, has feeding tube) presents to the ER for hypotension. Per son, patient was at dialysis, they had difficulty keeping patients blood pressure up so stopped dialysis and called EMS. Patient reports generalized weakness and generalized abdominal pain. Son reports patient was being treated for diarrhea, treated with Cleocin and Ciprofloxacin. Reports improvement but some loose stools. Denies fever, chills, chest pain, SOB, urinary complaints, vomiting.             88y Female with PMHx of hypotension, ESRD on dialysis T/Th/Sat 3 hour sessions (last session today, received 1 hour and 25 mins), CVA with paralysis (unsure which side, walks with walker at baseline, unable to swallow, has feeding tube) recently in the ED on Dec 7 for abdominal pain, postprandial diarrhea, G tube changed and pt prescribed Cipro and Flagyl to "help diarrhea", no colitis on CT, presents again to the ER for hypotension. Per son, patient was at dialysis, they had difficulty keeping patients blood pressure up so stopped dialysis and called EMS. Patient reports generalized weakness and generalized abdominal pain denies dizziness but states she "felt sick" while at HD. Son reports improvement in diarrhea however pt w/ some loose stools.. Denies fever, chills, chest pain, SOB, urinary complaints, nausea or vomiting.

## 2022-12-13 NOTE — ED PROVIDER NOTE - NSICDXPASTSURGICALHX_GEN_ALL_CORE_FT
PAST SURGICAL HISTORY:  Arteriovenous graft for hemodialysis in place, primary 1991  non functioning    S/P cataract extraction Bilateral    S/P gastrostomy PEG tube    Status post insertion of dialysis catheter 2017

## 2022-12-13 NOTE — H&P ADULT - ASSESSMENT
88y Female with PMHx of hypotension on Midodrine, ESRD on dialysis T/Th/Sat 3 hour sessions (last session today, received 1 hour and 25 mins), CVA with paralysis (unsure which side, walks with walker at baseline, unable to swallow, has feeding tube) recently in the ED on Dec 7 for abdominal pain, postprandial diarrhea, G tube changed and pt prescribed Cipro and Flagyl to "help diarrhea", no colitis on CT, presents again to the ER for hypotension, pt being admitted for Acute Pancreatitis    # Acute Pancreatitis   - NPO for now  - tylenol prn  - discontinue abx,  - resume PEG feeds when improved    # Hypotension  - pt w/ known hx of Hypotension on Midodrine; increased dose pre HD days  - BP stable on arrival in ED  - c/w Midrodrine    # ESRD on HD T/TH/SAT  - HD per renal  - CT w/ inc ascites and b/l pleural effusions  - Renal consult in am, pls inform Dr Deng     # CVA w/ residual paralysis, functional quadraplegia, dysphagia s/p PEG  - Pt at BL  - pt w/ sacral ulcer, turn and repo q2    # DVT ppx - HSQ

## 2022-12-13 NOTE — ED ADULT TRIAGE NOTE - CHIEF COMPLAINT QUOTE
Hypotension at dialysis today, treatment stopped after 1hr 20 min , shunt in chest, speaking in full sentences, extremities cold, no palp BP at triage

## 2022-12-13 NOTE — ED ADULT NURSE NOTE - OBJECTIVE STATEMENT
Pt coming from dialysis with hypotension 90/50 and weakness. PMH stroke has right sided weakness, dysphagia, and difficulty swallowing.  at the bedside. Pt coming from dialysis with hypotension 90/50 and weakness. PMH stroke has right sided weakness, right facial droop, dysphagia, and difficulty swallowing.  at the bedside. JOY Plasencia at the bedside, NIHSS at baseline. Pt coming from dialysis with hypotension 90/50 and weakness. PMH stroke has right sided weakness, right facial droop, dysphagia, and difficulty swallowing. Son at the bedside. JOY Plasencia at the bedside, NIHSS at baseline. Has port in her upper left chest wall for dialysis. 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

## 2022-12-13 NOTE — H&P ADULT - NSHPLABSRESULTS_GEN_ALL_CORE
T(C): 36.3 (12-13-22 @ 19:31), Max: 36.3 (12-13-22 @ 19:31)  HR: 68 (12-13-22 @ 22:17) (65 - 73)  BP: 125/73 (12-13-22 @ 22:17) (0/0 - 125/73)  RR: 16 (12-13-22 @ 22:17) (15 - 20)  SpO2: 95% (12-13-22 @ 22:17) (94% - 96%)                        13.6   9.14  )-----------( 105      ( 13 Dec 2022 16:06 )             40.8     12-13    136  |  100  |  86<H>  ----------------------------<  144<H>  3.6   |  23  |  8.02<H>    Ca    9.3      13 Dec 2022 16:06    TPro  6.6  /  Alb  3.7  /  TBili  0.7  /  DBili  x   /  AST  48<H>  /  ALT  73  /  AlkPhos  134<H>  12-13    LIVER FUNCTIONS - ( 13 Dec 2022 16:06 )  Alb: 3.7 g/dL / Pro: 6.6 gm/dL / ALK PHOS: 134 U/L / ALT: 73 U/L / AST: 48 U/L / GGT: x           < from: CT Abdomen and Pelvis No Cont (12.13.22 @ 15:38) >      IMPRESSION:    Mild ascites, increased from prior.    Stable moderate bilateral pleural effusions with underlying compressive   atelectasis.    Right central line tip in the intrahepatic IVC.    Possible decubitus ulcer over the distal sacrum.    < from: CT Head No Cont (12.13.22 @ 15:38) >    IMPRESSION:  NO EVIDENCE OF ACUTE INTRACRANIAL HEMORRHAGE OR HYDROCEPHALUS.  ATROPHY   WITH WHITE MATTER ISCHEMIC CHANGES.      < end of copied text >    acetaminophen     Tablet .. 650 milliGRAM(s) Oral every 6 hours PRN  brimonidine 0.2% Ophthalmic Solution 1 Drop(s) Both EYES three times a day  dorzolamide 2%/timolol 0.5% Ophthalmic Solution 1 Drop(s) Both EYES two times a day  heparin   Injectable 5000 Unit(s) SubCutaneous every 12 hours  latanoprost 0.005% Ophthalmic Solution 1 Drop(s) Both EYES at bedtime  midodrine. 15 milliGRAM(s) Oral <User Schedule>  midodrine. 5 milliGRAM(s) Oral <User Schedule>  ondansetron Injectable 4 milliGRAM(s) IV Push every 8 hours PRN  sevelamer carbonate 1600 milliGRAM(s) Oral three times a day

## 2022-12-13 NOTE — ED ADULT NURSE NOTE - NSIMPLEMENTINTERV_GEN_ALL_ED
Implemented All Fall with Harm Risk Interventions:  [place] to call system. Call bell, personal items and telephone within reach. Instruct patient to call for assistance. Room bathroom lighting operational. Non-slip footwear when patient is off stretcher. Physically safe environment: no spills, clutter or unnecessary equipment. Stretcher in lowest position, wheels locked, appropriate side rails in place. Provide visual cue, wrist band, yellow gown, etc. Monitor gait and stability. Monitor for mental status changes and reorient to person, place, and time. Review medications for side effects contributing to fall risk. Reinforce activity limits and safety measures with patient and family. Provide visual clues: red socks.

## 2022-12-13 NOTE — H&P ADULT - NSICDXFAMILYHX_GEN_ALL_CORE_FT
FAMILY HISTORY:  Family history of heart disease  History of hypertension    Grandparent  Still living? No  Family history of breast cancer, Age at diagnosis: Age Unknown

## 2022-12-13 NOTE — ED ADULT NURSE REASSESSMENT NOTE - NS ED NURSE REASSESS COMMENT FT1
pt received awake and alert x4 speaking in full sentences in no acute distress. remains on cardiac monitor. VSS. son at bedside. pt admitted to hospital, awaiting in patient bed.

## 2022-12-13 NOTE — H&P ADULT - NSHPPHYSICALEXAM_GEN_ALL_CORE
PHYSICAL EXAM:    Vital Signs Last 24 Hrs  T(C): 36.3 (13 Dec 2022 19:31), Max: 36.3 (13 Dec 2022 19:31)  T(F): 97.4 (13 Dec 2022 19:31), Max: 97.4 (13 Dec 2022 19:31)  HR: 68 (13 Dec 2022 22:17) (65 - 73)  BP: 125/73 (13 Dec 2022 22:17) (0/0 - 125/73)  BP(mean): --  RR: 16 (13 Dec 2022 22:17) (15 - 20)  SpO2: 95% (13 Dec 2022 22:17) (94% - 96%)    Parameters below as of 13 Dec 2022 22:17  Patient On (Oxygen Delivery Method): room air        GENERAL: Pt lying in bed comfortably in NAD  HEENT:  Atraumatic, EOMI, conjunctiva and sclera clear, dry MM  NECK: Supple,  CHEST/LUNG: Dec BS at bases  HEART: Regular rate and rhythm  ABDOMEN: Bowel sounds present; tender at PEG tube site; site clean, Nondistended. No guarding or rigidity    EXTREMITIES:  2+ Peripheral Pulses, brisk capillary refill. No edema  NEUROLOGICAL:  Alert & Oriented pt w/ CVA w/ paralysis, fxnal quad, dysphagia  MSK: fuctional quadradplegia  SKIN: stage 2 sacral ulcer

## 2022-12-13 NOTE — ED PROVIDER NOTE - NSICDXPASTMEDICALHX_GEN_ALL_CORE_FT
PAST MEDICAL HISTORY:  Chronic renal failure On hemodialysis T, Th, Sat    History of CVA (cerebrovascular accident) Left sided weakness, dysphagia, PEG in place    Hypotension     Murmur     Osteoporosis

## 2022-12-13 NOTE — ED PROVIDER NOTE - OBJECTIVE STATEMENT
88y Female with PMHx of hypotension, ESRD on dialysis T/Th/Sat 3 hour sessions (last session today, received 1 hour and 25 mins), CVA with paralysis (unsure which side, walks with walker at baseline, unable to swallow, has feeding tube) presents to the ER for hypotension. Per son, patient was at dialysis, they had difficulty keeping patients blood pressure up so stopped dialysis and called EMS. Patient reports generalized weakness and generalized abdominal pain. Son reports patient was being treated for diarrhea, treated with Cleocin and Ciprofloxacin. Reports improvement but some loose stools. Denies fever, chills, chest pain, SOB, urinary complaints, vomiting.

## 2022-12-13 NOTE — ED PROVIDER NOTE - ABDOMINAL TENDER
Medication refill request left upper quadrant/right upper quadrant/left lower quadrant/right lower quadrant/suprapubic

## 2022-12-13 NOTE — ED PROVIDER NOTE - NS ED ROS FT
Constitutional: (-) Fever, (-) Chills  Skin: (-) Rashes  Eyes: (-) Visual changes, (-) Discharge, (-) Redness  Ears: (-) Hearing loss, (-)Tinnitus, (-) Ear pain  Nose: (-) Nasal congestion, (-) Runny nose  Mouth/Throat: (-) Sore throat  CV: (-) Chest pain, (-) palpitations  Resp: (-) Cough, (-) Shortness of breath, (-) Dyspnea on Exertion, (-) Wheezing  GI: (+) Abdominal pain, (-) Nausea, (-) Vomiting, (+) Diarrhea  : (-) Dysuria   MSK: (-) Myalgias  Neuro: (-) Headache

## 2022-12-13 NOTE — ED PROVIDER NOTE - ATTENDING SHARED VISIT SELECTORS
- Begin antibiotic twice daily for 10 days  - Continue flovent twice daily  - Continue to encourage fluids  - Tylenol and motrin as needed  - Humidifier in room  - May trial flonase  - May try honey to help soothe cough or sore throat  - If any worsening symptoms, please call History/Exam/Medical Decision Making

## 2022-12-14 NOTE — PATIENT PROFILE ADULT - FALL HARM RISK - HARM RISK INTERVENTIONS
Assistance with ambulation/Assistance OOB with selected safe patient handling equipment/Communicate Risk of Fall with Harm to all staff/Discuss with provider need for PT consult/Monitor gait and stability/Reinforce activity limits and safety measures with patient and family/Tailored Fall Risk Interventions/Visual Cue: Yellow wristband and red socks/Bed in lowest position, wheels locked, appropriate side rails in place/Call bell, personal items and telephone in reach/Instruct patient to call for assistance before getting out of bed or chair/Non-slip footwear when patient is out of bed/[place] to call system/Physically safe environment - no spills, clutter or unnecessary equipment/Purposeful Proactive Rounding/Room/bathroom lighting operational, light cord in reach

## 2022-12-14 NOTE — PROGRESS NOTE ADULT - ASSESSMENT
88y Female with PMHx of hypotension on Midodrine, ESRD on dialysis T/Th/Sat 3 hour sessions (last session today, received 1 hour and 25 mins), CVA with paralysis (unsure which side, walks with walker at baseline, unable to swallow, has feeding tube) recently in the ED on Dec 7 for abdominal pain, postprandial diarrhea, G tube changed and pt prescribed Cipro and Flagyl to "help diarrhea", no colitis on CT, presents again to the ER for hypotension, pt being admitted for Acute Pancreatitis    # Acute Pancreatitis   - Elevated Lipase, trending down, pain resolved now   - Abdominal pain resolved, will start Peg feeds and monitor symptoms.     Diarrhea   Resolved   will monitor symptoms.     # ESRD on HD T/TH/SAT  - HD per renal  - CT w/ inc ascites and b/l pleural effusions    Pleural Effusion   Pt denies SOB, tolerating room air   f/u w/ Renal, volume management with HD     # Chronic Hypotension  - c/w Midrodrine    # hx of CVA   - residual paralysis, functional quadriplegia,  dysphagia s/p PEG  - continue with supportive care       # DVT ppx - HSQ    Plan discussed with DTR at bedside.

## 2022-12-14 NOTE — PROGRESS NOTE ADULT - SUBJECTIVE AND OBJECTIVE BOX
Patient is a 88y old  Female who presents with a chief complaint of Acute Pancreatitis (13 Dec 2022 17:49)      INTERVAL HPI/ OVERNIGHT EVENTS: Pt was seen and examined at bedside today, No significant overnight events, pt denies any abdominal pain or diarrhea today, denies any new complaints.      MEDICATIONS  (STANDING):  brimonidine 0.2% Ophthalmic Solution 1 Drop(s) Both EYES three times a day  dorzolamide 2%/timolol 0.5% Ophthalmic Solution 1 Drop(s) Both EYES two times a day  heparin   Injectable 5000 Unit(s) SubCutaneous every 12 hours  influenza  Vaccine (HIGH DOSE) 0.7 milliLiter(s) IntraMuscular once  latanoprost 0.005% Ophthalmic Solution 1 Drop(s) Both EYES at bedtime  midodrine. 15 milliGRAM(s) Oral <User Schedule>  midodrine. 5 milliGRAM(s) Oral <User Schedule>  sevelamer carbonate 1600 milliGRAM(s) Oral three times a day    MEDICATIONS  (PRN):  acetaminophen     Tablet .. 650 milliGRAM(s) Oral every 6 hours PRN Temp greater or equal to 38C (100.4F), Mild Pain (1 - 3)  ondansetron Injectable 4 milliGRAM(s) IV Push every 8 hours PRN Nausea and/or Vomiting      Allergies    MSG causes dizziness (Other)  pcn (Hives)  penicillin (Hives)    Intolerances        REVIEW OF SYSTEMS:    Unable to examine due to [ ] Encephalopathy [ ] Advanced Dementia [ ] Expressive Aphasia [ ] Non-verbal patient    CONSTITUTIONAL: No fever,  No weight loss  EYES: No eye pain, visual disturbances, or discharge  ENMT:  No difficulty hearing, tinnitus, vertigo; No sinus or throat pain  NECK: No pain or stiffness  RESPIRATORY: No shortness of breath,  cough, wheezing, sputum or hemoptysis   CARDIOVASCULAR: No chest pain, palpitations, or leg swelling  GASTROINTESTINAL: No abdominal pain. No nausea, vomiting, diarrhea or constipation. No melena or hematochezia.  GENITOURINARY: No dysuria, frequency, hematuria, or incontinence  NEUROLOGICAL: Bedbound, slurred speech, dysphagia, No headaches, Dizziness, memory loss, loss of strength, numbness, or tremors  SKIN: No itching, burning, rashes, or lesions   MUSCULOSKELETAL: No joint pain or swelling; No muscle, back, or extremity pain  PSYCHIATRIC: No depression, anxiety, mood swings, or difficulty sleeping  HEME/LYMPH: No easy bruising, or bleeding gums      Vital Signs Last 24 Hrs  T(C): 36.6 (14 Dec 2022 11:30), Max: 36.6 (14 Dec 2022 11:30)  T(F): 97.8 (14 Dec 2022 11:30), Max: 97.8 (14 Dec 2022 11:30)  HR: 72 (14 Dec 2022 11:30) (68 - 86)  BP: 81/56 (14 Dec 2022 11:30) (81/56 - 128/57)  BP(mean): --  RR: 20 (14 Dec 2022 11:30) (16 - 20)  SpO2: 97% (14 Dec 2022 11:30) (94% - 98%)    Parameters below as of 14 Dec 2022 11:30  Patient On (Oxygen Delivery Method): room air        PHYSICAL EXAM:  GENERAL: NAD on RA, well-developed, well-groomed  HEAD:  Atraumatic, Normocephalic  EYES: conjunctiva and sclera clear  ENMT: Moist mucous membranes  NECK: Supple, No JVD, Normal thyroid  CHEST/LUNG: Clear to Auscultation bilaterally; No rales, rhonchi, wheezing, or rubs  HEART: Regular rate and rhythm; No murmurs, rubs, or gallops  ABDOMEN: Soft, Peg tube, Nontender, Nondistended; Bowel sounds present  EXTREMITIES:  2+ Peripheral Pulses, No clubbing, cyanosis, or edema  SKIN: No rashes or lesions  NERVOUS SYSTEM:  Alert & Oriented X3, slurred speech, Left facial droop, Good concentration; Motor Strength 5/5 B/L upper and lower extremities    LABS:                        14.1   9.91  )-----------( 100      ( 14 Dec 2022 09:00 )             41.4     12-14    136  |  103  |  94<H>  ----------------------------<  89  3.9   |  16<L>  |  8.76<H>    Ca    9.3      14 Dec 2022 09:00  Mg     2.3     12-14    TPro  6.5  /  Alb  3.5  /  TBili  0.7  /  DBili  x   /  AST  97<H>  /  ALT  130<H>  /  AlkPhos  128<H>  12-14        CAPILLARY BLOOD GLUCOSE              RADIOLOGY & ADDITIONAL TESTS:          Imaging Personally Reviewed:  [ ] YES  [ ] NO    Consultant(s) Notes Reviewed:  [ ] YES  [ ] NO    Care Discussed with Consultants/Other Providers [x ] YES  [ ] NO

## 2022-12-14 NOTE — PATIENT PROFILE ADULT - NSPROPTRIGHTSUPPORTPERSON_GEN_A_NUR
OP Anticoagulation Service Note    Date: 2/28/2018  Anticoagulation Summary  As of 2/28/2018    INR goal:   2.0-3.0   TTR:   100.0 % (3.1 mo)   Today's INR:   2.7   Maintenance plan:   2.5 mg (2.5 mg x 1) every day   Weekly total:   17.5 mg   No change documented:   Matt Gil, Med Ass't   Plan last modified:   Lina MelendezD (1/31/2018)   Next INR check:   3/28/2018   Target end date:   Indefinite         Anticoagulation Episode Summary     INR check location:       Preferred lab:       Send INR reminders to:       Comments:         Anticoagulation Care Providers     Provider Role Specialty Phone number    SHERLY Lees [facility] [phone]        Anticoagulation Patient Findings  Patient Findings     Negatives:   Signs/symptoms of thrombosis, Signs/symptoms of bleeding, Laboratory test error suspected, Change in health, Change in alcohol use, Change in activity, Upcoming invasive procedure, Emergency department visit, Upcoming dental procedure, Missed doses, Extra doses, Change in medications, Change in diet/appetite, Hospital admission, Bruising, Other complaints        Plan: Spoke to patient. Patient is therapeutic and will remain on the same dose. Patient reports no unusual bleeding or bruising and no changes to medication or diet. Patient is to be checked again in 4 weeks.      Matt Gil    I have reviewed and am in agreement with the above stated plan on 2-28-18.  Results are from 2-28-18 via [facility] lab.  Keenan Griggs, LinaD     same name as above

## 2022-12-15 NOTE — AIRWAY PLACEMENT NOTE ADULT - AIRWAY COMMENTS:
Pt intubated with the assistance of ICU MD Dr. Smith w 7.5 ETT/22cm lipline, +ezco2, b/l BS, secured with tube ashley, pre and post oxygenated with 100% oxygen through ambu bag Pt intubated with the assistance of ICU MD Dr. Smith w 7.5 ETT/22cm lipline, +ezco2, b/l BS, secured with tube ashley, pre and post oxygenated with 100% oxygen through ambu bag. I was present through out and assisted in the Intubation. DA

## 2022-12-15 NOTE — RAPID RESPONSE TEAM SUMMARY - NSSITUATIONBACKGROUNDRRT_GEN_ALL_CORE
Patient admitted with abdominal pain/?pancreatitis. RRT called for patient noted to be hypotensive with SBP 80s. Patient seen and examined at the bedside. Complaining of minor lightheadedness and some residual abdominal pain. Patient has history of chronic hypotension requiring PO midodrine. Remainder of vitals at the bedside WNL. Case discussed in detail with Dr. Rizvi and Dr. Mac. 
CODE BLUE was called at 11:45 pm on 12/15/22 by RN when she entered the patients room for her nightly rounds, noticed the patient was unresponsive and vitals machine was unable to  pulse/o2 sat. Last known well was 10:30 pm. Code team arrived at bedside at 11:46 pm and began ACLS protocol after performing a pulse check. The code lasted from 11:46pm to 12:06 am on 12/15/22. She was intubated at 11:53 pm and in total, the patient received 6 rounds of epi, 1 sodium bicarb, and 2 amps of D50 (11:57 pm & 12:03 am) for FS of 33 & 35. Time of Death was called at 12:06 am by the nocturnist due to failure to achieve ROSC/ obtain palpable pulse.   Patient's Daughter (Palma Macias p: [phone]) was notified when the code was called & again when time of death was announced.

## 2022-12-15 NOTE — DISCHARGE NOTE FOR THE EXPIRED PATIENT - HOSPITAL COURSE
88y Female with PMHx of hypotension, ESRD on dialysis T/Th/Sat 3 hour sessions (last session today, received 1 hour and 25 mins), CVA with paralysis (unsure which side, walks with walker at baseline, unable to swallow, has feeding tube) recently in the ED on Dec 7 for abdominal pain, postprandial diarrhea, G tube changed and pt prescribed Cipro and Flagyl to "help diarrhea", no colitis on CT, presents again to the ER for hypotension. Per son, patient was at dialysis, they had difficulty keeping patients blood pressure up so stopped dialysis and called EMS. Patient reports generalized weakness and generalized abdominal pain denies dizziness but states she "felt sick" while at HD. Son reports improvement in diarrhea however pt w/ some loose stools.. Denies fever, chills, chest pain, SOB, urinary complaints, nausea or vomiting. (13 Dec 2022 17:49)    She was admitted for acute pancreatitis for which the pain has been resolved and LFTs were trending down; she was started on tube feeds via PEG. Given her pmhx of ESRD she was being followed by nephrology to determine the need for HD. Her course complicated by prior hx of chronic hypotension- requiring midodrine prior to dialysis. During the day on 12/14/22 a Rapid Response was called for hypotension for which was treated with IVF Bolus an additional dose of Midodrine and maintenance IVF.     GAY VIGIL was called at 11:45 pm on 12/15/22 by RN when she entered the patients room for her nightly rounds, noticed the patient was unresponsive and vitals machine was unable to  pulse/o2 sat. Last known well was 10:30 pm. Code team arrived at bedside at 11:46 pm and began ACLS protocol after performing a pulse check. The code lasted from 11:46pm to 12:06 am on 12/15/22. She was intubated at 11:53 pm and in total, the patient received 6 rounds of epi, 1 sodium bicarb, and 2 amps of D50 (11:57 pm & 12:03 am) for FS of 33 & 35. Time of Death was called at 12:06 am by the nocturnist due to failure to achieve ROSC/ obtain palpable pulse.

## 2022-12-20 DIAGNOSIS — R53.2 FUNCTIONAL QUADRIPLEGIA: ICD-10-CM

## 2022-12-20 DIAGNOSIS — M81.0 AGE-RELATED OSTEOPOROSIS WITHOUT CURRENT PATHOLOGICAL FRACTURE: ICD-10-CM

## 2022-12-20 DIAGNOSIS — Z93.1 GASTROSTOMY STATUS: ICD-10-CM

## 2022-12-20 DIAGNOSIS — I69.354 HEMIPLEGIA AND HEMIPARESIS FOLLOWING CEREBRAL INFARCTION AFFECTING LEFT NON-DOMINANT SIDE: ICD-10-CM

## 2022-12-20 DIAGNOSIS — N18.6 END STAGE RENAL DISEASE: ICD-10-CM

## 2022-12-20 DIAGNOSIS — Z79.82 LONG TERM (CURRENT) USE OF ASPIRIN: ICD-10-CM

## 2022-12-20 DIAGNOSIS — J90 PLEURAL EFFUSION, NOT ELSEWHERE CLASSIFIED: ICD-10-CM

## 2022-12-20 DIAGNOSIS — Z20.822 CONTACT WITH AND (SUSPECTED) EXPOSURE TO COVID-19: ICD-10-CM

## 2022-12-20 DIAGNOSIS — K85.90 ACUTE PANCREATITIS WITHOUT NECROSIS OR INFECTION, UNSPECIFIED: ICD-10-CM

## 2022-12-20 DIAGNOSIS — I95.89 OTHER HYPOTENSION: ICD-10-CM

## 2022-12-20 DIAGNOSIS — R18.8 OTHER ASCITES: ICD-10-CM

## 2022-12-20 DIAGNOSIS — I69.391 DYSPHAGIA FOLLOWING CEREBRAL INFARCTION: ICD-10-CM

## 2022-12-20 DIAGNOSIS — Z99.2 DEPENDENCE ON RENAL DIALYSIS: ICD-10-CM

## 2022-12-20 DIAGNOSIS — Z88.0 ALLERGY STATUS TO PENICILLIN: ICD-10-CM

## 2022-12-20 DIAGNOSIS — L89.152 PRESSURE ULCER OF SACRAL REGION, STAGE 2: ICD-10-CM

## 2022-12-29 NOTE — H&P ADULT. - PRO ARRIVE FROM
home
[FreeTextEntry1] : 86M presents today for a suprapubic catheter change. He states that he notes perineal discomfort following bowel movements. He notes that he strains to have bowel movements. He has seen a gastroenterologist regarding this. He is using a stool softener and Metamucil.
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
## 2023-03-08 NOTE — ED ADULT TRIAGE NOTE - NS ED NURSE AMBULANCES
[employer] Mercedes Flap Text: The defect edges were debeveled with a #15 scalpel blade.  Given the location of the defect, shape of the defect and the proximity to free margins a Mercedes flap was deemed most appropriate.  Using a sterile surgical marker, an appropriate advancement flap was drawn incorporating the defect and placing the expected incisions within the relaxed skin tension lines where possible. The area thus outlined was incised deep to adipose tissue with a #15 scalpel blade.  The skin margins were undermined to an appropriate distance in all directions utilizing iris scissors.

## 2023-04-05 NOTE — ED PROVIDER NOTE - NS ED ROS FT
Quality 130: Documentation Of Current Medications In The Medical Record: Current Medications Documented Detail Level: Detailed Quality 431: Preventive Care And Screening: Unhealthy Alcohol Use - Screening: Patient not identified as an unhealthy alcohol user when screened for unhealthy alcohol use using a systematic screening method Quality 226: Preventive Care And Screening: Tobacco Use: Screening And Cessation Intervention: Patient screened for tobacco use and is an ex/non-smoker ROS: denies HA, weakness, dizziness, fevers/chills, nausea/vomiting, chest pain, SOB, diaphoresis, abdominal pain, back/neck pain, dysuria/hematuria, or rash  +sore throat, cough

## 2023-08-02 NOTE — ED PROVIDER NOTE - DIAGNOSTIC INTERPRETATION
cxr: left permacath insitu Cyclophosphamide Pregnancy And Lactation Text: This medication is Pregnancy Category D and it isn't considered safe during pregnancy. This medication is excreted in breast milk.

## 2023-08-22 NOTE — ED ADULT NURSE NOTE - SPO2 (%)
normal. No respiratory distress. Breath sounds: Normal breath sounds. Abdominal:      General: Abdomen is flat. Bowel sounds are normal.      Palpations: Abdomen is soft. Tenderness: There is no abdominal tenderness. Genitourinary:     Comments: Nguyen catheter in place with purulent appearing urine  Musculoskeletal:         General: No swelling or tenderness. Right lower leg: No edema. Left lower leg: No edema. Comments: Right AKA   Skin:     General: Skin is warm and dry. Capillary Refill: Capillary refill takes less than 2 seconds. Comments: Wet gangrenous appearing wound to the left sole   Neurological:      Mental Status: He is alert. Comments: Patient is alert, answers some questions appropriately. He moves bilateral upper extremities spontaneously. Psychiatric:         Mood and Affect: Mood normal.         Behavior: Behavior normal.       DIAGNOSTIC RESULTS     EKG: All EKG's are interpreted by the Emergency Department Physician who either signs or Co-signs this chart in the absence of a cardiologist.  The Ekg interpreted by me in the absence of a cardiologist shows. Atrial fibrillation with ventricular rate of 88. Axis left. QTc appropriate. No specific ST or T wave abnormality. No prior for comparison. RADIOLOGY:   Non-plain film images such as CT, Ultrasound and MRI are read by the radiologist. Plain radiographic images are visualized and preliminarily interpreted by the emergency physician with the below findings:    Interpretation per the Radiologist below, if available at the time of this note:    XR CHEST PORTABLE   Final Result   1. Prominent bilateral interstitial lung markings are present without focal   consolidation. 2. Bibasilar atelectasis. XR FOOT LEFT (MIN 3 VIEWS)   Preliminary Result   Soft tissue ulceration at the posterior calcaneus with indistinct bony margin   of the posterior calcaneus, possibly early osteomyelitis. All other components within normal limits    Narrative:     Amna Stephen  [facility] tel. [phone],  Chemistry results called to and read back by AUBRIE Johnson, 08/22/2023  13:39, by Kamini Frank TO CULTURE - Abnormal; Notable for the following components:    Color, UA DARK YELLOW (*)     Clarity, UA TURBID (*)     Glucose, Ur 100 (*)     Bilirubin Urine LARGE (*)     Ketones, Urine TRACE (*)     Blood, Urine LARGE (*)     Leukocyte Esterase, Urine LARGE (*)     All other components within normal limits   MAGNESIUM - Abnormal; Notable for the following components:    Magnesium 3.10 (*)     All other components within normal limits    Narrative:     SHANNAN  [facility] tel. [phone],  Chemistry results called to and read back by AUBRIE Johnson, 08/22/2023  13:39, by Jeff Choudhary - Abnormal; Notable for the following components:    Protime 26.5 (*)     INR 2.45 (*)     All other components within normal limits   MICROSCOPIC URINALYSIS - Abnormal; Notable for the following components:    Coarse Casts, UA 3-5 (*)     WBC, UA  (*)     RBC, UA 21-50 (*)     Bacteria, UA 2+ (*)     Crystals, UA Few Ca. Oxalate (*)     All other components within normal limits   GENTAMICIN LEVEL, RANDOM - Abnormal; Notable for the following components:    Gentamicin Rm >10.1 (*)     All other components within normal limits    Narrative:     Amna Stephen  [facility] tel. [phone],  Chemistry results called to and read back by  Kelly Johnson RN, 08/22/2023  15:20, by Jakob Epperson   URIC ACID - Abnormal; Notable for the following components:    Uric Acid, Serum 7.3 (*)     All other components within normal limits   COVID-19, RAPID   CULTURE, BLOOD 1   CULTURE, BLOOD 2   CULTURE, URINE   LACTATE, SEPSIS   CK   CREATININE, RANDOM URINE   ELECTROLYTES URINE RANDOM   BASIC METABOLIC PANEL W/ REFLEX TO MG FOR LOW K       All other labs were within normal range or not returned as of this dictation.     EMERGENCY DEPARTMENT COURSE 96

## 2023-09-07 NOTE — ED ADULT TRIAGE NOTE - HEIGHT IN INCHES
Cystic Fibrosis Self-Care Plan    RECOMMENDATIONS:   Help us provide the best possible care. If you receive a questionnaire from the CF Foundation about your clinic experience today please fill it out.  It should take less than 5 minutes. Let us know what we are doing well and how we can improve.    Augmentin twice daily for 2 weeks.  Call the office if not feeling better by the end of the antibiotics  Continue current medication, exercise.  Influenza vaccine and COVID booster in later September or early October. Separately.        Cystic Fibrosis :    Bebe Vaughn  [phone]  [facility] Nurse line:  Velasquez JOSHI   [phone]     [facility] Fax Number:      [phone]         Cystic Fibrosis Respiratory Therapists:   Janeth Ryan                          [phone]          Loida Trinidad   [phone]  Cystic Fibrosis Dietitians:              Nisa Dumont              [phone]                            April Cardona                        [phone]   Cystic Fibrosis Diabetes Nurse:    Barbara Birmingham               [phone]    Cystic Fibrosis Social Workers:     Amber Davis              [phone]                     Debora Joseph               [phone]  Cystic Fibrosis Pharmacists:           Ileana Kilgore                              [phone] (pager)         Lennie Lara      [phone]   Cystic Fibrosis Genetic Counselor:   Faby Flores    [phone]    [facility] website:  [email or web address]    We have recently learned about an albuterol neb solution shortage due to a manufacturing delay. There is still a small supply coming in but not enough to meet the current demand. We do not yet have an estimate for when this will become widely available again.    We our asking our CF community to do the following:    Please take time to check your supply of albuterol neb solution at home. We recommend  keeping at least a 2-week supply of albuterol nebs at home in case of illness.    2.  If you have an albuterol inhaler at home, you can use 4 puffs of the inhaler with a spacer in place of the nebulized albuterol at the start of your treatments. It is important to use a spacer for the best technique. If you do not have a spacer at home or have questions on technique, we will be happy to review and send one to your home address. Please also take a moment to check that your albuterol HFA inhaler is available and not .  inhalers may be less effective as the medication loses its potency or power. In some instances your team may suggest another alternative instead of an albuterol inhaler.    3.  Please take care in requesting refills. Albuterol neb solution is a life-saving medication for patients having severe asthma attacks and other emergency respiratory conditions. Let s work together to make sure that albuterol neb solution is available to those who need it urgently.    Reach out to your care team with questions and to confirm your planned alternative for albuterol nebs. [employer] will be the fastest way to connect. If possible, please reserve the nursing line for more urgent concerns as we are short on nursing staff.    Here are some reputable sites with more information:    https://www.[employer]idrap.[employer].edu/resilient-drug-supply/us-liquid-albuterol-xjzvjdgt-vwfgelui-hiazis-after-major-supplier-shuts-down    https://www.[employer]//health/albuterol-shortage    https://www.[facility]p.org/drug-shortages/current-shortages/drug-shortage-detail.aspx?yo=249&loginreturnUrl=SSOCheckOnly       MRN: [id number]   Clinic Date: 2023   Patient: Philip Delacruz     Annual Studies:   IGG   Date Value Ref Range Status   2021 1,198 610 - 1,616 mg/dL Final     Immunoglobulin G   Date Value Ref Range Status   2023 1,111 610 - 1,616 mg/dL Final     Insulin   Date Value Ref Range Status   2019  9.0 3 - 25 mU/L Final     There are no preventive care reminders to display for this patient.    Pulmonary Function Tests  FEV1: amount of air you can blow out in 1 second  FVC: total amount of air you can take in and blow out    Your Goals:             Latest Ref Rng & Units 9/7/2023     3:20 PM   PFT   FVC L 5.23  P   FEV1 L 4.44  P   FVC% % 100  P   FEV1% % 105  P      P Preliminary result          Airway Clearance: The Most Important Way to Keep Your Lungs Healthy  Continue exercise for airway clearance.    Good Nutrition Can Improve Lung Function and Overall Health    Take ALL of your vitamins with food    Take 1/2 of your enzymes before EVERY meal/snack and the other 1/2 mid-meal/snack    Wt Readings from Last 3 Encounters:   09/07/23 83.1 kg (183 lb 3.2 oz)   05/04/23 85 kg (187 lb 6.3 oz)   10/27/22 79.4 kg (175 lb)       Body mass index is 24.63 kg/m .         National CF Foundation Recommendations for BMI in CF Adults: Women: at least 22 Men: at least 23        Controlling Blood Sugars Helps Prevent Lung Infections & Improves Nutrition  Test blood sugar:    In the morning before eating (goal is )    2 hours after a meal (goal is less than 150)    When pre-meal glucose is greater than 150 add correction    At bedtime (if less than 100 eat a snack with 15 grams of carbohydrates  Last A1C Results:   Hemoglobin A1C   Date Value Ref Range Status   05/04/2023 5.0 <5.7 % Final     Comment:     Normal <5.7%   Prediabetes 5.7-6.4%    Diabetes 6.5% or higher     Note: Adopted from ADA consensus guidelines.   04/08/2021 4.8 0 - 5.6 % Final     Comment:     Normal <5.7% Prediabetes 5.7-6.4%  Diabetes 6.5% or higher - adopted from ADA   consensus guidelines.           If diabetic, measure A1C every 6 months. Goal: Under 7%    Staying Healthy  Research:  If you are interested in learning about research opportunities or have questions, please contact the CF Research Team at [phone] or [email or web address].    CF  Foundation:  [employer] is a personalized resource service to help you with the insurance, financial, legal and other issues you are facing.  It's free, confidential and available to anyone with CF.  Ask your  for more information or contact [employer] directly at 613-JOSHVSW ([phone]) or compass@cff.org, or learn more at cff.org/compass.                      4

## 2023-11-01 NOTE — ED PROVIDER NOTE - GASTROINTESTINAL [+], MLM
What Type Of Note Output Would You Prefer (Optional)?: Standard Output
What Is The Reason For Today's Visit?: Annual Full Body Skin Examination with No Concerns
What Is The Reason For Today's Visit? (Being Monitored For X): concerning skin lesions on an annual basis
How Severe Are Your Spot(S)?: moderate
ABDOMINAL PAIN/DIARRHEA/NAUSEA

## 2024-02-19 NOTE — ED ADULT TRIAGE NOTE - NS ED NURSE BANDS TYPE
RN received message from PCP stating he updated the patient's medical marijuana card.     \"LPK that card eligibility updated\"     MCM sent to pt to inform.    Name band;

## 2025-07-08 NOTE — ED ADULT NURSE NOTE - HISTORY OF COVID-19 VACCINATION
[FreeTextEntry1] : f/u with speech therapy-- pt. expected to improve further with Speech.    Cont. Amantadine and Mirtazepine-- Rx provided.    All questions answered.    
Yes